# Patient Record
Sex: MALE | Race: BLACK OR AFRICAN AMERICAN | NOT HISPANIC OR LATINO
[De-identification: names, ages, dates, MRNs, and addresses within clinical notes are randomized per-mention and may not be internally consistent; named-entity substitution may affect disease eponyms.]

---

## 2017-11-28 ENCOUNTER — RESULT REVIEW (OUTPATIENT)
Age: 82
End: 2017-11-28

## 2018-08-17 PROBLEM — Z00.00 ENCOUNTER FOR PREVENTIVE HEALTH EXAMINATION: Status: ACTIVE | Noted: 2018-08-17

## 2018-09-13 ENCOUNTER — APPOINTMENT (OUTPATIENT)
Dept: VASCULAR SURGERY | Facility: CLINIC | Age: 83
End: 2018-09-13
Payer: MEDICARE

## 2018-09-13 VITALS — SYSTOLIC BLOOD PRESSURE: 98 MMHG | HEART RATE: 66 BPM | DIASTOLIC BLOOD PRESSURE: 63 MMHG

## 2018-09-13 VITALS
BODY MASS INDEX: 16.39 KG/M2 | HEIGHT: 66 IN | DIASTOLIC BLOOD PRESSURE: 57 MMHG | SYSTOLIC BLOOD PRESSURE: 99 MMHG | HEART RATE: 68 BPM | WEIGHT: 102 LBS

## 2018-09-13 DIAGNOSIS — L97.309 NON-PRESSURE CHRONIC ULCER OF UNSPECIFIED ANKLE WITH UNSPECIFIED SEVERITY: ICD-10-CM

## 2018-09-13 PROCEDURE — 99203 OFFICE O/P NEW LOW 30 MIN: CPT

## 2018-10-10 ENCOUNTER — INPATIENT (INPATIENT)
Facility: HOSPITAL | Age: 83
LOS: 9 days | Discharge: HOME | DRG: 871 | End: 2018-10-20
Attending: INTERNAL MEDICINE | Admitting: INTERNAL MEDICINE
Payer: COMMERCIAL

## 2018-10-10 VITALS
SYSTOLIC BLOOD PRESSURE: 87 MMHG | HEART RATE: 115 BPM | TEMPERATURE: 99 F | OXYGEN SATURATION: 90 % | DIASTOLIC BLOOD PRESSURE: 62 MMHG | RESPIRATION RATE: 18 BRPM

## 2018-10-10 DIAGNOSIS — N40.0 BENIGN PROSTATIC HYPERPLASIA WITHOUT LOWER URINARY TRACT SYMPTOMS: ICD-10-CM

## 2018-10-10 DIAGNOSIS — Z71.89 OTHER SPECIFIED COUNSELING: ICD-10-CM

## 2018-10-10 DIAGNOSIS — F03.90 UNSPECIFIED DEMENTIA WITHOUT BEHAVIORAL DISTURBANCE: ICD-10-CM

## 2018-10-10 DIAGNOSIS — I10 ESSENTIAL (PRIMARY) HYPERTENSION: ICD-10-CM

## 2018-10-10 DIAGNOSIS — M86.9 OSTEOMYELITIS, UNSPECIFIED: ICD-10-CM

## 2018-10-10 DIAGNOSIS — R93.89 ABNORMAL FINDINGS ON DIAGNOSTIC IMAGING OF OTHER SPECIFIED BODY STRUCTURES: ICD-10-CM

## 2018-10-10 DIAGNOSIS — Z29.9 ENCOUNTER FOR PROPHYLACTIC MEASURES, UNSPECIFIED: ICD-10-CM

## 2018-10-10 LAB
ALBUMIN SERPL ELPH-MCNC: 2.2 G/DL — LOW (ref 3.5–5)
ALP SERPL-CCNC: 55 U/L — SIGNIFICANT CHANGE UP (ref 40–120)
ALT FLD-CCNC: 29 U/L DA — SIGNIFICANT CHANGE UP (ref 10–60)
ANION GAP SERPL CALC-SCNC: 10 MMOL/L — SIGNIFICANT CHANGE UP (ref 5–17)
APPEARANCE UR: CLEAR — SIGNIFICANT CHANGE UP
APTT BLD: 29.6 SEC — SIGNIFICANT CHANGE UP (ref 27.5–37.4)
AST SERPL-CCNC: 32 U/L — SIGNIFICANT CHANGE UP (ref 10–40)
BASOPHILS # BLD AUTO: 0.1 K/UL — SIGNIFICANT CHANGE UP (ref 0–0.2)
BASOPHILS NFR BLD AUTO: 0.5 % — SIGNIFICANT CHANGE UP (ref 0–2)
BILIRUB SERPL-MCNC: 0.3 MG/DL — SIGNIFICANT CHANGE UP (ref 0.2–1.2)
BILIRUB UR-MCNC: NEGATIVE — SIGNIFICANT CHANGE UP
BUN SERPL-MCNC: 20 MG/DL — HIGH (ref 7–18)
CALCIUM SERPL-MCNC: 7.8 MG/DL — LOW (ref 8.4–10.5)
CHLORIDE SERPL-SCNC: 98 MMOL/L — SIGNIFICANT CHANGE UP (ref 96–108)
CO2 SERPL-SCNC: 25 MMOL/L — SIGNIFICANT CHANGE UP (ref 22–31)
COLOR SPEC: YELLOW — SIGNIFICANT CHANGE UP
CREAT SERPL-MCNC: 1.21 MG/DL — SIGNIFICANT CHANGE UP (ref 0.5–1.3)
DIFF PNL FLD: ABNORMAL
EOSINOPHIL # BLD AUTO: 0 K/UL — SIGNIFICANT CHANGE UP (ref 0–0.5)
EOSINOPHIL NFR BLD AUTO: 0 % — SIGNIFICANT CHANGE UP (ref 0–6)
ERYTHROCYTE [SEDIMENTATION RATE] IN BLOOD: 82 MM/HR — HIGH (ref 0–20)
GLUCOSE SERPL-MCNC: 138 MG/DL — HIGH (ref 70–99)
GLUCOSE UR QL: NEGATIVE — SIGNIFICANT CHANGE UP
HCT VFR BLD CALC: 30.4 % — LOW (ref 39–50)
HGB BLD-MCNC: 9.7 G/DL — LOW (ref 13–17)
INR BLD: 1.37 RATIO — HIGH (ref 0.88–1.16)
KETONES UR-MCNC: NEGATIVE — SIGNIFICANT CHANGE UP
LACTATE SERPL-SCNC: 1.7 MMOL/L — SIGNIFICANT CHANGE UP (ref 0.7–2)
LACTATE SERPL-SCNC: 2.5 MMOL/L — HIGH (ref 0.7–2)
LEUKOCYTE ESTERASE UR-ACNC: ABNORMAL
LYMPHOCYTES # BLD AUTO: 1.4 K/UL — SIGNIFICANT CHANGE UP (ref 1–3.3)
LYMPHOCYTES # BLD AUTO: 10.2 % — LOW (ref 13–44)
MAGNESIUM SERPL-MCNC: 2 MG/DL — SIGNIFICANT CHANGE UP (ref 1.6–2.6)
MCHC RBC-ENTMCNC: 27.2 PG — SIGNIFICANT CHANGE UP (ref 27–34)
MCHC RBC-ENTMCNC: 32 GM/DL — SIGNIFICANT CHANGE UP (ref 32–36)
MCV RBC AUTO: 85 FL — SIGNIFICANT CHANGE UP (ref 80–100)
MONOCYTES # BLD AUTO: 1 K/UL — HIGH (ref 0–0.9)
MONOCYTES NFR BLD AUTO: 7.5 % — SIGNIFICANT CHANGE UP (ref 2–14)
NEUTROPHILS # BLD AUTO: 11.2 K/UL — HIGH (ref 1.8–7.4)
NEUTROPHILS NFR BLD AUTO: 81.8 % — HIGH (ref 43–77)
NITRITE UR-MCNC: NEGATIVE — SIGNIFICANT CHANGE UP
PH UR: 6.5 — SIGNIFICANT CHANGE UP (ref 5–8)
PLATELET # BLD AUTO: 472 K/UL — HIGH (ref 150–400)
POTASSIUM SERPL-MCNC: 4.3 MMOL/L — SIGNIFICANT CHANGE UP (ref 3.5–5.3)
POTASSIUM SERPL-SCNC: 4.3 MMOL/L — SIGNIFICANT CHANGE UP (ref 3.5–5.3)
PROT SERPL-MCNC: 7.2 G/DL — SIGNIFICANT CHANGE UP (ref 6–8.3)
PROT UR-MCNC: 30 MG/DL
PROTHROM AB SERPL-ACNC: 15 SEC — HIGH (ref 9.8–12.7)
RAPID RVP RESULT: DETECTED
RBC # BLD: 3.57 M/UL — LOW (ref 4.2–5.8)
RBC # FLD: 14.4 % — SIGNIFICANT CHANGE UP (ref 10.3–14.5)
RV+EV RNA SPEC QL NAA+PROBE: DETECTED
SODIUM SERPL-SCNC: 133 MMOL/L — LOW (ref 135–145)
SP GR SPEC: 1.01 — SIGNIFICANT CHANGE UP (ref 1.01–1.02)
UROBILINOGEN FLD QL: 4
WBC # BLD: 13.6 K/UL — HIGH (ref 3.8–10.5)
WBC # FLD AUTO: 13.6 K/UL — HIGH (ref 3.8–10.5)

## 2018-10-10 PROCEDURE — 99223 1ST HOSP IP/OBS HIGH 75: CPT | Mod: GC

## 2018-10-10 PROCEDURE — 71250 CT THORAX DX C-: CPT | Mod: 26

## 2018-10-10 PROCEDURE — 73630 X-RAY EXAM OF FOOT: CPT | Mod: 26,RT

## 2018-10-10 PROCEDURE — 71045 X-RAY EXAM CHEST 1 VIEW: CPT | Mod: 26

## 2018-10-10 PROCEDURE — 93010 ELECTROCARDIOGRAM REPORT: CPT

## 2018-10-10 PROCEDURE — 99285 EMERGENCY DEPT VISIT HI MDM: CPT

## 2018-10-10 RX ORDER — IPRATROPIUM/ALBUTEROL SULFATE 18-103MCG
3 AEROSOL WITH ADAPTER (GRAM) INHALATION ONCE
Qty: 0 | Refills: 0 | Status: COMPLETED | OUTPATIENT
Start: 2018-10-10 | End: 2018-10-10

## 2018-10-10 RX ORDER — PIPERACILLIN AND TAZOBACTAM 4; .5 G/20ML; G/20ML
3.38 INJECTION, POWDER, LYOPHILIZED, FOR SOLUTION INTRAVENOUS EVERY 12 HOURS
Qty: 0 | Refills: 0 | Status: DISCONTINUED | OUTPATIENT
Start: 2018-10-10 | End: 2018-10-11

## 2018-10-10 RX ORDER — VANCOMYCIN HCL 1 G
1000 VIAL (EA) INTRAVENOUS ONCE
Qty: 0 | Refills: 0 | Status: COMPLETED | OUTPATIENT
Start: 2018-10-10 | End: 2018-10-10

## 2018-10-10 RX ORDER — ENOXAPARIN SODIUM 100 MG/ML
30 INJECTION SUBCUTANEOUS DAILY
Qty: 0 | Refills: 0 | Status: DISCONTINUED | OUTPATIENT
Start: 2018-10-10 | End: 2018-10-20

## 2018-10-10 RX ORDER — SODIUM CHLORIDE 9 MG/ML
2000 INJECTION INTRAMUSCULAR; INTRAVENOUS; SUBCUTANEOUS ONCE
Qty: 0 | Refills: 0 | Status: COMPLETED | OUTPATIENT
Start: 2018-10-10 | End: 2018-10-10

## 2018-10-10 RX ORDER — LISINOPRIL 2.5 MG/1
20 TABLET ORAL DAILY
Qty: 0 | Refills: 0 | Status: DISCONTINUED | OUTPATIENT
Start: 2018-10-10 | End: 2018-10-12

## 2018-10-10 RX ORDER — FINASTERIDE 5 MG/1
5 TABLET, FILM COATED ORAL DAILY
Qty: 0 | Refills: 0 | Status: DISCONTINUED | OUTPATIENT
Start: 2018-10-10 | End: 2018-10-20

## 2018-10-10 RX ORDER — VANCOMYCIN HCL 1 G
750 VIAL (EA) INTRAVENOUS EVERY 24 HOURS
Qty: 0 | Refills: 0 | Status: DISCONTINUED | OUTPATIENT
Start: 2018-10-10 | End: 2018-10-10

## 2018-10-10 RX ORDER — CEFEPIME 1 G/1
2000 INJECTION, POWDER, FOR SOLUTION INTRAMUSCULAR; INTRAVENOUS ONCE
Qty: 0 | Refills: 0 | Status: COMPLETED | OUTPATIENT
Start: 2018-10-10 | End: 2018-10-10

## 2018-10-10 RX ORDER — VANCOMYCIN HCL 1 G
750 VIAL (EA) INTRAVENOUS EVERY 12 HOURS
Qty: 0 | Refills: 0 | Status: DISCONTINUED | OUTPATIENT
Start: 2018-10-10 | End: 2018-10-12

## 2018-10-10 RX ORDER — TAMSULOSIN HYDROCHLORIDE 0.4 MG/1
0.4 CAPSULE ORAL AT BEDTIME
Qty: 0 | Refills: 0 | Status: DISCONTINUED | OUTPATIENT
Start: 2018-10-10 | End: 2018-10-20

## 2018-10-10 RX ORDER — ASPIRIN/CALCIUM CARB/MAGNESIUM 324 MG
81 TABLET ORAL DAILY
Qty: 0 | Refills: 0 | Status: DISCONTINUED | OUTPATIENT
Start: 2018-10-10 | End: 2018-10-20

## 2018-10-10 RX ADMIN — Medication 250 MILLIGRAM(S): at 17:50

## 2018-10-10 RX ADMIN — Medication 3 MILLILITER(S): at 15:00

## 2018-10-10 RX ADMIN — Medication 3 MILLILITER(S): at 15:52

## 2018-10-10 RX ADMIN — Medication 81 MILLIGRAM(S): at 22:30

## 2018-10-10 RX ADMIN — TAMSULOSIN HYDROCHLORIDE 0.4 MILLIGRAM(S): 0.4 CAPSULE ORAL at 22:30

## 2018-10-10 RX ADMIN — CEFEPIME 100 MILLIGRAM(S): 1 INJECTION, POWDER, FOR SOLUTION INTRAMUSCULAR; INTRAVENOUS at 15:53

## 2018-10-10 RX ADMIN — SODIUM CHLORIDE 2000 MILLILITER(S): 9 INJECTION INTRAMUSCULAR; INTRAVENOUS; SUBCUTANEOUS at 15:57

## 2018-10-10 RX ADMIN — CEFEPIME 2000 MILLIGRAM(S): 1 INJECTION, POWDER, FOR SOLUTION INTRAMUSCULAR; INTRAVENOUS at 17:50

## 2018-10-10 RX ADMIN — Medication 125 MILLIGRAM(S): at 15:53

## 2018-10-10 RX ADMIN — SODIUM CHLORIDE 2000 MILLILITER(S): 9 INJECTION INTRAMUSCULAR; INTRAVENOUS; SUBCUTANEOUS at 14:05

## 2018-10-10 RX ADMIN — Medication 3 MILLILITER(S): at 15:51

## 2018-10-10 RX ADMIN — PIPERACILLIN AND TAZOBACTAM 25 GRAM(S): 4; .5 INJECTION, POWDER, LYOPHILIZED, FOR SOLUTION INTRAVENOUS at 22:31

## 2018-10-10 RX ADMIN — ENOXAPARIN SODIUM 30 MILLIGRAM(S): 100 INJECTION SUBCUTANEOUS at 22:30

## 2018-10-10 NOTE — H&P ADULT - PROBLEM SELECTOR PLAN 5
not on any medications   bedbound - non verbal, non mobile for about 14 years  follow up with palliative

## 2018-10-10 NOTE — ED PROVIDER NOTE - OBJECTIVE STATEMENT
Per Family 86 year old M Pt w/ PMHx of BPH, HTN, and Advanced Dementia presents to ED c/o right foot ulceration x 2 weeks. Pt visited podiatrist yesterday for a worsening right foot ulceration, located by his big toe. Pt was referred to ED for evaluation of possible osteomyelitis yesterday, but was unable to come to ED yesterday as it was not possible to arrange an ambulance.  NKDA

## 2018-10-10 NOTE — CONSULT NOTE ADULT - SUBJECTIVE AND OBJECTIVE BOX
Patient is a 86y old  Male who presents with a chief complaint of right foot ulcers    HPI: Per Family 86 year old M Pt w/ PMHx of BPH, HTN, and Advanced Dementia presents to ED c/o right foot ulceration x 2 weeks. Pt visited podiatrist yesterday for a worsening right foot ulceration, located by his big toe. Pt was referred to ED for evaluation of possible osteomyelitis yesterday, but was unable to come to ED yesterday as it was not possible to arrange an ambulance.  NKDA  Pt seen bedside in ED by podiatry. Pt unable to communicate, Pt's family states he can not communicate. Pt's family denies F/N/V/SOB. states pt sent by podiatrist Dr. Valentin to evaluate if patient has osteomyelitis.       PMH:HTN (hypertension)  Advanced dementia  BPH (benign prostatic hyperplasia)    Allergies: No Known Allergies    Medications:   FH:  PSX: No significant past surgical history    SH:     Vital Signs Last 24 Hrs  T(C): 37.1 (10 Oct 2018 17:07), Max: 37.3 (10 Oct 2018 12:38)  T(F): 98.7 (10 Oct 2018 17:07), Max: 99.2 (10 Oct 2018 12:38)  HR: 118 (10 Oct 2018 17:07) (110 - 118)  BP: 141/91 (10 Oct 2018 17:07) (87/62 - 141/91)  BP(mean): --  RR: 18 (10 Oct 2018 17:07) (18 - 18)  SpO2: 96% (10 Oct 2018 17:07) (90% - 96%)    LABS                        9.7    13.6  )-----------( 472      ( 10 Oct 2018 14:18 )             30.4               10-10    133<L>  |  98  |  20<H>  ----------------------------<  138<H>  4.3   |  25  |  1.21    Ca    7.8<L>      10 Oct 2018 14:18  Mg     2.0     10-10    TPro  7.2  /  Alb  2.2<L>  /  TBili  0.3  /  DBili  x   /  AST  32  /  ALT  29  /  AlkPhos  55  10-10        PHYSICAL EXAM  GEN: PERLA HER is a pleasant well-nourished, well developed 86y Male in no acute distress, alert awake, and oriented to person, place and time.   LE Focused: right foot focused   Vasc:  DP/PT palpable, CFT < 3 secs x 5, no edema, TG warm to cool  Derm: ulcers: medial aspect of 1st MPJ, dorsal foot, lateral ankle, no drainage, -PTB to all, no malodor, fibrogranular base, normal borders  Neuro: unable to perform      Imaging: < from: Xray Foot AP + Lateral + Oblique, Right (10.10.18 @ 15:27) >  PROCEDURE DATE:  10/10/2018          INTERPRETATION:  CLINICAL STATEMENT: Pain. Evaluate for osteomyelitis   first toe    TECHNIQUE: AP, lateral and oblique views of right foot    COMPARISON: None.    FINDINGS:  Diffuse soft tissue swelling first digit. Flexion of the phalanges   limited evaluation of the phalanges.. Diffuse osteopenia. Difficult to   evaluate first interphalangeal joint.    Lucency at the head of the first metatarsal which may represent   osteomyelitis in the correct clinical setting. Correlate clinically.    Otherwise, no acute fracture        IMPRESSION:  Findings as described above.     < end of copied text >    Cultures: pending    A: right foot ulcers    P:  pt evaluated and chart reviewed  culture obtained  xrays reviewed see above  MRI recommended to rule out OM  IV antibiotics as per ID  Podiatry to follow while in house

## 2018-10-10 NOTE — H&P ADULT - ATTENDING COMMENTS
Patient seen/evaluated at bedside in the ED on 10/10. I agree with the resident H&P note/outlined plan of care. My independent findings and conclusions are documented. Please refer to this section for full list of diagnoses.    80 y/o male with advanced Alzheimer's Dementia, non-verbal, bedbound x 16 years, referred from podiatrist office for worsening ulceration of right foot. Initial vitals in the ED showed SBP in 80s, . According to the nephew who lives with this patient, he eat ?"regular food" Also states he ambulates at baseline.    ROS not meaningfully obtained    PE vitals reviewed  cachectic, eyes open, non responsive/not interactive--> nephew reports this is his baseline x 16 years  PERRLA  coarse upper airway sounds  S1S2 RRR  right great toe ulcer    xray Diffuse soft tissue swelling first digit. Flexion of the phalanges   limited evaluation of the phalanges.. Diffuse osteopenia. Difficult to   evaluate first interphalangeal joint.    Lucency at the head of the first metatarsal which may represent   osteomyelitis in the correct clinical setting. Correlate clinically.    Otherwise, no acute fracture    1.  sepsis on admission (elevated lactate, leukocytosis,  tachycardia) s/t  2. cellulitis infected ulcer of right foot, and likely osteomyelitis of right great toe  3. suspected aspiration pneumonia, RLL consolidation  4. enterovirus infection/bronchitis  5. severe protein calorie malnutrition  6. advanced alzheimer's dementia  7. dysphagia  8. debility      -continue w/ zosyn, vancomycin  -ID consult  -MRI of right foot if able to tolerate (which he may not)  -q 2 hour turns  -bronchodilators  -need collateral information from son/PMD  -change diet to dysphagia puree, HOB to 30 degrees  -speech and swallow consult  -add dietary supplements, nutrition consult  -physical therapy consult, if able to cooperate  -palliative care consult  -dvt ppx Patient seen/evaluated at bedside in the ED on 10/10. I agree with the resident H&P note/outlined plan of care. My independent findings and conclusions are documented. Please refer to this section for full list of diagnoses.    87 y/o male with advanced Alzheimer's Dementia, non-verbal, bedbound x 16 years, referred from podiatrist office for worsening ulceration of right foot. Initial vitals in the ED showed SBP in 80s, . According to the nephew who lives with this patient, he eat ?"regular food" Also states he ambulates at baseline.    ROS not meaningfully obtained    PE vitals reviewed  cachectic, eyes open, non responsive/not interactive--> nephew reports this is his baseline x 16 years  PERRLA  coarse upper airway sounds  S1S2 RRR  right great toe ulcer    xray Diffuse soft tissue swelling first digit. Flexion of the phalanges   limited evaluation of the phalanges.. Diffuse osteopenia. Difficult to   evaluate first interphalangeal joint.    Lucency at the head of the first metatarsal which may represent   osteomyelitis in the correct clinical setting. Correlate clinically.    Otherwise, no acute fracture    1.  sepsis on admission (elevated lactate, leukocytosis,  tachycardia) s/t  2. cellulitis infected ulcer of right foot, and likely osteomyelitis of right great toe  3. suspected aspiration pneumonia, RLL consolidation  4. enterovirus infection/bronchitis  5. severe protein calorie malnutrition  6. advanced alzheimer's dementia  7. dysphagia  8. debility      -continue w/ zosyn, vancomycin  -ID consult  -MRI of right foot if able to tolerate (which he may not)  -q 2 hour turns  -bronchodilators  -need collateral information from son/PMD  -change diet to dysphagia puree, HOB to 30 degrees  -speech and swallow consult  -add dietary supplements, nutrition consult  -physical therapy consult, if able to cooperate  -palliative care consult  -dvt ppx

## 2018-10-10 NOTE — H&P ADULT - NSHPPHYSICALEXAM_GEN_ALL_CORE
· Constitutional	Well-developed, well nourished  · Eyes	no discharge noted   · Respiratory	Breath Sounds equal & clear to percussion & auscultation, no accessory muscle use  · Cardiovascular	Regular rate & rhythm, normal S1, S2; no murmurs, gallops or rubs; no S3, S4  · Gastrointestinal	Soft, non-tender, no hepatosplenomegaly, normal bowel sounds  · Extremities	right foot wrapped in dressing  · Neurological	AAOx0, contracted extremities, non verbal

## 2018-10-10 NOTE — H&P ADULT - NSHPLABSRESULTS_GEN_ALL_CORE
Vital Signs Last 24 Hrs  T(C): 37.1 (10 Oct 2018 17:07), Max: 37.3 (10 Oct 2018 12:38)  T(F): 98.7 (10 Oct 2018 17:07), Max: 99.2 (10 Oct 2018 12:38)  HR: 118 (10 Oct 2018 17:07) (110 - 118)  BP: 141/91 (10 Oct 2018 17:07) (87/62 - 141/91)  BP(mean): --  RR: 18 (10 Oct 2018 17:07) (18 - 18)  SpO2: 96% (10 Oct 2018 17:07) (90% - 96%)                            9.7    13.6  )-----------( 472      ( 10 Oct 2018 14:18 )             30.4       10-10    133<L>  |  98  |  20<H>  ----------------------------<  138<H>  4.3   |  25  |  1.21    Ca    7.8<L>      10 Oct 2018 14:18  Mg     2.0     10-10    TPro  7.2  /  Alb  2.2<L>  /  TBili  0.3  /  DBili  x   /  AST  32  /  ALT  29  /  AlkPhos  55  10-10              Urinalysis Basic - ( 10 Oct 2018 14:40 )    Color: Yellow / Appearance: Clear / S.015 / pH: x  Gluc: x / Ketone: Negative  / Bili: Negative / Urobili: 4   Blood: x / Protein: 30 mg/dL / Nitrite: Negative   Leuk Esterase: Trace / RBC: Negative /HPF / WBC 0-2 /HPF   Sq Epi: x / Non Sq Epi: Moderate /HPF / Bacteria: Few /HPF        PT/INR - ( 10 Oct 2018 14:18 )   PT: 15.0 sec;   INR: 1.37 ratio         PTT - ( 10 Oct 2018 14:18 )  PTT:29.6 sec    Lactate Trend  10-10 @ 17:50 Lactate:1.7   10-10 @ 14:18 Lactate:2.5

## 2018-10-10 NOTE — ED ADULT NURSE NOTE - NSIMPLEMENTINTERV_GEN_ALL_ED
Implemented All Fall with Harm Risk Interventions:  [place] to call system. Call bell, personal items and telephone within reach. Instruct patient to call for assistance. Room bathroom lighting operational. Non-slip footwear when patient is off stretcher. Physically safe environment: no spills, clutter or unnecessary equipment. Stretcher in lowest position, wheels locked, appropriate side rails in place. Provide visual cue, wrist band, yellow gown, etc. Monitor gait and stability. Monitor for mental status changes and reorient to person, place, and time. Review medications for side effects contributing to fall risk. Reinforce activity limits and safety measures with patient and family. Provide visual clues: red socks.

## 2018-10-10 NOTE — H&P ADULT - PROBLEM SELECTOR PLAN 1
was sent from podiatry for foot ulcer  Xray foot: Lucency at the head of the first metatarsal which may represent osteomyelitis  podiatry was consulted and recommended MRI   stat dose of vanco and cefepime was given in ED   case discussed with ID Dr. Zimmer   will continue with vanco and zosyn   follow MRI

## 2018-10-10 NOTE — ED ADULT NURSE NOTE - OBJECTIVE STATEMENT
AS PER WIFE THEY BROUGHT PT IN FOR XRAY OF THE RIGHT FOOT TO R/O OESTOMYLISIS. PT IS NOVERBAL AND CONTRACTED IN WHEELCHAIR

## 2018-10-10 NOTE — H&P ADULT - ASSESSMENT
86/ M Pt with PMH of BPH, HTN, and Advanced Dementia presents to ED c/o right foot ulceration x 2 weeks. Pt visited podiatrist yesterday for a worsening right foot ulceration, located by his big toe form where he was sent to hospital for further work up, Patient was seen in ED by podiatry.     In ED:   stat dose of vanco and cefepime was given   Vitals: 136/90, 110, 98.3, 18(96)  podiatry was consulted and recommended MRI , wound culture was taken   Xray foot: Lucency at the head of the first metatarsal which may represent osteomyelitis  Xray chest: Question additional density superimposing with anterior right   first rib. Extra imaging and/or CT might be advisable.

## 2018-10-10 NOTE — H&P ADULT - PROBLEM SELECTOR PLAN 2
Xray chest: Question additional density superimposing with anterior right   first rib. Extra imaging and/or CT might be advisable.  will get CT chest

## 2018-10-11 DIAGNOSIS — J18.8 OTHER PNEUMONIA, UNSPECIFIED ORGANISM: ICD-10-CM

## 2018-10-11 LAB
24R-OH-CALCIDIOL SERPL-MCNC: 39.6 NG/ML — SIGNIFICANT CHANGE UP (ref 30–80)
ANION GAP SERPL CALC-SCNC: 9 MMOL/L — SIGNIFICANT CHANGE UP (ref 5–17)
BASOPHILS # BLD AUTO: 0 K/UL — SIGNIFICANT CHANGE UP (ref 0–0.2)
BASOPHILS NFR BLD AUTO: 0.3 % — SIGNIFICANT CHANGE UP (ref 0–2)
BUN SERPL-MCNC: 15 MG/DL — SIGNIFICANT CHANGE UP (ref 7–18)
CALCIUM SERPL-MCNC: 8 MG/DL — LOW (ref 8.4–10.5)
CHLORIDE SERPL-SCNC: 102 MMOL/L — SIGNIFICANT CHANGE UP (ref 96–108)
CHOLEST SERPL-MCNC: 88 MG/DL — SIGNIFICANT CHANGE UP (ref 10–199)
CO2 SERPL-SCNC: 21 MMOL/L — LOW (ref 22–31)
CREAT SERPL-MCNC: 0.89 MG/DL — SIGNIFICANT CHANGE UP (ref 0.5–1.3)
CRP SERPL-MCNC: 9.25 MG/DL — HIGH (ref 0–0.4)
EOSINOPHIL # BLD AUTO: 0 K/UL — SIGNIFICANT CHANGE UP (ref 0–0.5)
EOSINOPHIL NFR BLD AUTO: 0 % — SIGNIFICANT CHANGE UP (ref 0–6)
FOLATE SERPL-MCNC: 14.9 NG/ML — SIGNIFICANT CHANGE UP
GLUCOSE SERPL-MCNC: 163 MG/DL — HIGH (ref 70–99)
HBA1C BLD-MCNC: 5.5 % — SIGNIFICANT CHANGE UP (ref 4–5.6)
HCT VFR BLD CALC: 26.8 % — LOW (ref 39–50)
HDLC SERPL-MCNC: 10 MG/DL — LOW
HGB BLD-MCNC: 8.5 G/DL — LOW (ref 13–17)
LIPID PNL WITH DIRECT LDL SERPL: 66 MG/DL — SIGNIFICANT CHANGE UP
LYMPHOCYTES # BLD AUTO: 1.3 K/UL — SIGNIFICANT CHANGE UP (ref 1–3.3)
LYMPHOCYTES # BLD AUTO: 13 % — SIGNIFICANT CHANGE UP (ref 13–44)
MAGNESIUM SERPL-MCNC: 2.1 MG/DL — SIGNIFICANT CHANGE UP (ref 1.6–2.6)
MCHC RBC-ENTMCNC: 27.3 PG — SIGNIFICANT CHANGE UP (ref 27–34)
MCHC RBC-ENTMCNC: 31.8 GM/DL — LOW (ref 32–36)
MCV RBC AUTO: 85.9 FL — SIGNIFICANT CHANGE UP (ref 80–100)
MONOCYTES # BLD AUTO: 0.2 K/UL — SIGNIFICANT CHANGE UP (ref 0–0.9)
MONOCYTES NFR BLD AUTO: 1.9 % — LOW (ref 2–14)
NEUTROPHILS # BLD AUTO: 8.3 K/UL — HIGH (ref 1.8–7.4)
NEUTROPHILS NFR BLD AUTO: 84.9 % — HIGH (ref 43–77)
PHOSPHATE SERPL-MCNC: 2.9 MG/DL — SIGNIFICANT CHANGE UP (ref 2.5–4.5)
PLATELET # BLD AUTO: 455 K/UL — HIGH (ref 150–400)
POTASSIUM SERPL-MCNC: 3.9 MMOL/L — SIGNIFICANT CHANGE UP (ref 3.5–5.3)
POTASSIUM SERPL-SCNC: 3.9 MMOL/L — SIGNIFICANT CHANGE UP (ref 3.5–5.3)
RBC # BLD: 3.12 M/UL — LOW (ref 4.2–5.8)
RBC # FLD: 14.3 % — SIGNIFICANT CHANGE UP (ref 10.3–14.5)
SODIUM SERPL-SCNC: 132 MMOL/L — LOW (ref 135–145)
TOTAL CHOLESTEROL/HDL RATIO MEASUREMENT: 8.8 RATIO — SIGNIFICANT CHANGE UP (ref 3.4–9.6)
TRIGL SERPL-MCNC: 60 MG/DL — SIGNIFICANT CHANGE UP (ref 10–149)
TSH SERPL-MCNC: 0.17 UU/ML — LOW (ref 0.34–4.82)
VIT B12 SERPL-MCNC: 1347 PG/ML — HIGH (ref 232–1245)
WBC # BLD: 9.8 K/UL — SIGNIFICANT CHANGE UP (ref 3.8–10.5)
WBC # FLD AUTO: 9.8 K/UL — SIGNIFICANT CHANGE UP (ref 3.8–10.5)

## 2018-10-11 PROCEDURE — 99233 SBSQ HOSP IP/OBS HIGH 50: CPT | Mod: GC

## 2018-10-11 RX ORDER — ACETYLCYSTEINE 200 MG/ML
2.5 VIAL (ML) MISCELLANEOUS EVERY 6 HOURS
Qty: 0 | Refills: 0 | Status: DISCONTINUED | OUTPATIENT
Start: 2018-10-11 | End: 2018-10-19

## 2018-10-11 RX ORDER — SODIUM CHLORIDE 9 MG/ML
1000 INJECTION, SOLUTION INTRAVENOUS
Qty: 0 | Refills: 0 | Status: DISCONTINUED | OUTPATIENT
Start: 2018-10-11 | End: 2018-10-12

## 2018-10-11 RX ORDER — ACETYLCYSTEINE 200 MG/ML
4 VIAL (ML) MISCELLANEOUS EVERY 6 HOURS
Qty: 0 | Refills: 0 | Status: DISCONTINUED | OUTPATIENT
Start: 2018-10-11 | End: 2018-10-11

## 2018-10-11 RX ORDER — PIPERACILLIN AND TAZOBACTAM 4; .5 G/20ML; G/20ML
3.38 INJECTION, POWDER, LYOPHILIZED, FOR SOLUTION INTRAVENOUS EVERY 8 HOURS
Qty: 0 | Refills: 0 | Status: DISCONTINUED | OUTPATIENT
Start: 2018-10-11 | End: 2018-10-20

## 2018-10-11 RX ORDER — INFLUENZA VIRUS VACCINE 15; 15; 15; 15 UG/.5ML; UG/.5ML; UG/.5ML; UG/.5ML
0.5 SUSPENSION INTRAMUSCULAR ONCE
Qty: 0 | Refills: 0 | Status: COMPLETED | OUTPATIENT
Start: 2018-10-11 | End: 2018-10-19

## 2018-10-11 RX ORDER — IPRATROPIUM/ALBUTEROL SULFATE 18-103MCG
3 AEROSOL WITH ADAPTER (GRAM) INHALATION EVERY 6 HOURS
Qty: 0 | Refills: 0 | Status: DISCONTINUED | OUTPATIENT
Start: 2018-10-11 | End: 2018-10-17

## 2018-10-11 RX ADMIN — FINASTERIDE 5 MILLIGRAM(S): 5 TABLET, FILM COATED ORAL at 17:16

## 2018-10-11 RX ADMIN — Medication 3 MILLILITER(S): at 12:51

## 2018-10-11 RX ADMIN — Medication 250 MILLIGRAM(S): at 06:13

## 2018-10-11 RX ADMIN — Medication 81 MILLIGRAM(S): at 17:15

## 2018-10-11 RX ADMIN — SODIUM CHLORIDE 75 MILLILITER(S): 9 INJECTION, SOLUTION INTRAVENOUS at 13:17

## 2018-10-11 RX ADMIN — Medication 3 MILLILITER(S): at 17:15

## 2018-10-11 RX ADMIN — Medication 250 MILLIGRAM(S): at 17:24

## 2018-10-11 RX ADMIN — PIPERACILLIN AND TAZOBACTAM 25 GRAM(S): 4; .5 INJECTION, POWDER, LYOPHILIZED, FOR SOLUTION INTRAVENOUS at 06:16

## 2018-10-11 RX ADMIN — Medication 4 MILLILITER(S): at 20:25

## 2018-10-11 RX ADMIN — ENOXAPARIN SODIUM 30 MILLIGRAM(S): 100 INJECTION SUBCUTANEOUS at 15:00

## 2018-10-11 RX ADMIN — Medication 4 MILLILITER(S): at 10:24

## 2018-10-11 RX ADMIN — LISINOPRIL 20 MILLIGRAM(S): 2.5 TABLET ORAL at 06:17

## 2018-10-11 RX ADMIN — Medication 3 MILLILITER(S): at 20:24

## 2018-10-11 RX ADMIN — Medication 4 MILLILITER(S): at 17:19

## 2018-10-11 RX ADMIN — PIPERACILLIN AND TAZOBACTAM 25 GRAM(S): 4; .5 INJECTION, POWDER, LYOPHILIZED, FOR SOLUTION INTRAVENOUS at 17:23

## 2018-10-11 NOTE — CONSULT NOTE ADULT - SUBJECTIVE AND OBJECTIVE BOX
HPI:  86/ M Pt with PMH of BPH, HTN, and Advanced Dementia presents to ED c/o right foot ulceration x 2 weeks. Pt visited podiatrist yesterday for a worsening right foot ulceration, located by his big toe form where he was sent to hospital for further work up, Patient was seen in ED by podiatry. (10 Oct 2018 19:50)    REVIEW OF SYSTEMS:  [  ] Not able to illicit  General:	  Chest:	  GI:	  :  Skin:	  Musculoskeletal:	  Neuro:    PAST MEDICAL & SURGICAL HISTORY:  HTN (hypertension)  Advanced dementia  BPH (benign prostatic hyperplasia)  No significant past surgical history    ALLERGIES: No Known Allergies    MEDS:  acetylcysteine 20% Inhalation 4 milliLiter(s) Inhalation every 6 hours  ALBUTerol/ipratropium for Nebulization 3 milliLiter(s) Nebulizer every 6 hours  aspirin enteric coated 81 milliGRAM(s) Oral daily  dextrose 5% + sodium chloride 0.9%. 1000 milliLiter(s) IV Continuous <Continuous>  enoxaparin Injectable 30 milliGRAM(s) SubCutaneous daily  finasteride 5 milliGRAM(s) Oral daily  influenza   Vaccine 0.5 milliLiter(s) IntraMuscular once  lisinopril 20 milliGRAM(s) Oral daily  piperacillin/tazobactam IVPB. 3.375 Gram(s) IV Intermittent every 12 hours  tamsulosin 0.4 milliGRAM(s) Oral at bedtime  vancomycin  IVPB 750 milliGRAM(s) IV Intermittent every 12 hours    SOCIAL HISTORY:  Smoker:      FAMILY HISTORY:  No pertinent family history in first degree relatives    VITALS:  Vital Signs Last 24 Hrs  T(C): 36.8 (11 Oct 2018 04:48), Max: 37.3 (10 Oct 2018 12:38)  T(F): 98.2 (11 Oct 2018 04:48), Max: 99.2 (10 Oct 2018 12:38)  HR: 90 (11 Oct 2018 04:48) (82 - 118)  BP: 122/64 (11 Oct 2018 04:48) (87/62 - 141/91)  BP(mean): --  RR: 18 (11 Oct 2018 04:48) (15 - 18)  SpO2: 97% (11 Oct 2018 04:48) (90% - 99%)      PHYSICAL EXAM:  Constitutional:  HEENT:  Neck:  Respiratory:  Cardiovascular:  Gastrointestinal:  Extremities:  Skin:  Ortho:  Neuro:      LABS/DIAGNOSTIC TESTS:                        8.5    9.8   )-----------( 455      ( 11 Oct 2018 07:37 )             26.8     WBC Count: 9.8 K/uL (10-11 @ 07:37)  WBC Count: 13.6 K/uL (10-10 @ 14:18)    10-11    132<L>  |  102  |  15  ----------------------------<  163<H>  3.9   |  21<L>  |  0.89    Ca    8.0<L>      11 Oct 2018 07:37  Phos  2.9     10-11  Mg     2.1     10-11    TPro  7.2  /  Alb  2.2<L>  /  TBili  0.3  /  DBili  x   /  AST  32  /  ALT  29  /  AlkPhos  55  10-10    Urinalysis Basic - ( 10 Oct 2018 14:40 )    Color: Yellow / Appearance: Clear / S.015 / pH: x  Gluc: x / Ketone: Negative  / Bili: Negative / Urobili: 4   Blood: x / Protein: 30 mg/dL / Nitrite: Negative   Leuk Esterase: Trace / RBC: Negative /HPF / WBC 0-2 /HPF   Sq Epi: x / Non Sq Epi: Moderate /HPF / Bacteria: Few /HPF      LIVER FUNCTIONS - ( 10 Oct 2018 14:18 )  Alb: 2.2 g/dL / Pro: 7.2 g/dL / ALK PHOS: 55 U/L / ALT: 29 U/L DA / AST: 32 U/L / GGT: x           PT/INR - ( 10 Oct 2018 14:18 )   PT: 15.0 sec;   INR: 1.37 ratio         PTT - ( 10 Oct 2018 14:18 )  PTT:29.6 sec  Lactate, Blood: 1.7 mmol/L (10-10 @ 17:50)  Lactate, Blood: 2.5 mmol/L (10-10 @ 14:18)    ABG -     CULTURES:       RADIOLOGY: HPI:  ID consult was called to evaluate 85 y/o male for pneumonia and right foot ulcer. Sent to hosp yesterday as per podiatrist for worsening right foot ulcer.  Upon workup, it was also found that patient has pneumonia likely from aspiration. +RVP with rhino/ enterovirus as well. Going for MRI of right foot today. Cultures are all testing.     As per H&P:  86/ M Pt with PMH of BPH, HTN, and Advanced Dementia presents to ED c/o right foot ulceration x 2 weeks. Pt visited podiatrist yesterday for a worsening right foot ulceration, located by his big toe form where he was sent to hospital for further work up, Patient was seen in ED by podiatry. (10 Oct 2018 19:50)    REVIEW OF SYSTEMS:  [ X ] Not able to illicit     PAST MEDICAL & SURGICAL HISTORY:  HTN (hypertension)  Advanced dementia  BPH (benign prostatic hyperplasia)  No significant past surgical history    ALLERGIES: No Known Allergies    MEDS:  acetylcysteine 20% Inhalation 4 milliLiter(s) Inhalation every 6 hours  ALBUTerol/ipratropium for Nebulization 3 milliLiter(s) Nebulizer every 6 hours  aspirin enteric coated 81 milliGRAM(s) Oral daily  dextrose 5% + sodium chloride 0.9%. 1000 milliLiter(s) IV Continuous <Continuous>  enoxaparin Injectable 30 milliGRAM(s) SubCutaneous daily  finasteride 5 milliGRAM(s) Oral daily  influenza   Vaccine 0.5 milliLiter(s) IntraMuscular once  lisinopril 20 milliGRAM(s) Oral daily  piperacillin/tazobactam IVPB. 3.375 Gram(s) IV Intermittent every 12 hours  tamsulosin 0.4 milliGRAM(s) Oral at bedtime  vancomycin  IVPB 750 milliGRAM(s) IV Intermittent every 12 hours    SOCIAL HISTORY:  Smoker:  unknown    FAMILY HISTORY:  No pertinent family history in first degree relatives    VITALS:  Vital Signs Last 24 Hrs  T(C): 36.8 (11 Oct 2018 04:48), Max: 37.3 (10 Oct 2018 12:38)  T(F): 98.2 (11 Oct 2018 04:48), Max: 99.2 (10 Oct 2018 12:38)  HR: 90 (11 Oct 2018 04:48) (82 - 118)  BP: 122/64 (11 Oct 2018 04:48) (87/62 - 141/91)  BP(mean): --  RR: 18 (11 Oct 2018 04:48) (15 - 18)  SpO2: 97% (11 Oct 2018 04:48) (90% - 99%)      PHYSICAL EXAM:  Constitutional: frail, thin elderly male  HEENT: dry oral mucosa with poor dentaton; bilateral temporal wasting  Neck: stiff arthritic neck no LN's   Respiratory: bilateral coarse rhonchorous sounds  +audible congestion  Cardiovascular: S1 S2 reg no murmurs  Gastrointestinal: +BS with soft, nondistended abdomen; nontender  Extremities: no edema no cyanosis  Skin: ulceration along lateral aspect of right foot by head of MTP, currently dry  Ortho: mildly contracted BLE  Neuro: demented       LABS/DIAGNOSTIC TESTS:                        8.5    9.8   )-----------( 455      ( 11 Oct 2018 07:37 )             26.8     WBC Count: 9.8 K/uL (10-11 @ 07:37)  WBC Count: 13.6 K/uL (10-10 @ 14:18)    10-    132<L>  |  102  |  15  ----------------------------<  163<H>  3.9   |  21<L>  |  0.89    Ca    8.0<L>      11 Oct 2018 07:37  Phos  2.9     10-11  Mg     2.1     10-11    TPro  7.2  /  Alb  2.2<L>  /  TBili  0.3  /  DBili  x   /  AST  32  /  ALT  29  /  AlkPhos  55  10-10    Urinalysis Basic - ( 10 Oct 2018 14:40 )  Color: Yellow / Appearance: Clear / S.015 / pH: x  Gluc: x / Ketone: Negative  / Bili: Negative / Urobili: 4   Blood: x / Protein: 30 mg/dL / Nitrite: Negative   Leuk Esterase: Trace / RBC: Negative /HPF / WBC 0-2 /HPF   Sq Epi: x / Non Sq Epi: Moderate /HPF / Bacteria: Few /HPF    LIVER FUNCTIONS - ( 10 Oct 2018 14:18 )  Alb: 2.2 g/dL / Pro: 7.2 g/dL / ALK PHOS: 55 U/L / ALT: 29 U/L DA / AST: 32 U/L / GGT: x           PT/INR - ( 10 Oct 2018 14:18 )   PT: 15.0 sec;   INR: 1.37 ratio    PTT - ( 10 Oct 2018 14:18 )  PTT:29.6 sec    Lactate, Blood: 1.7 mmol/L (10-10 @ 17:50)  Lactate, Blood: 2.5 mmol/L (10-10 @ 14:18)    Sedimentation Rate, Erythrocyte (10.10.18 @ 17:50)    Sedimentation Rate, Erythrocyte: 82 mm/Hr    Rapid Respiratory Viral Panel (10.10.18 @ 14:40)    Rapid RVP Result: Entero/Rhinovirus (RapRVP): Detected        CULTURES:   10/10 BC - pending   10/10 UC - pending   10/10 right foot ulcer culture - pending       RADIOLOGY:  10/11 MRI of right foot - ordered    EXAM:  CT CHEST                        PROCEDURE DATE:  10/10/2018    INTERPRETATION:  Chest CT without IV contrast    Indication: Evaluation for a lung mass.    Technique: Axial multidetector CT images of the chest are acquired   withoutIV contrast.    Comparison: None.    Findings: No evidence for pleural effusion. Small anterior pericardial   effusion. The heart is not enlarged. Aortic and coronary artery   calcifications are present. No evidence for aortic aneurysm.  Mildly   enlarged 1.5 cm lymph node at the zygoesophageal recess. Allowing for the   noncontrast technique, there is no grossly enlarged hilar, or axillary   lymph node.    Evaluation of the central airway is limited by respiratory motion.    Tree-in-bud nodular opacifications, and consolidation in the right lower   lung zone. Mild infiltrative densities in the right upper lobe. Slight   groundglass densities in the left lower lobe. Findings are suggestive of   pneumonia. Clinical correlation is recommended.    Limited sections through the upper abdomen demonstrate cholelithiasis.   Small nonobstructive calculus in the right kidney.    Impression: Small anterior pericardial effusion.    Mildly enlarged lymph node at the azygoesophageal recess.    Tree-in-bud nodular opacifications, and consolidation in the right lower   lung zone. Mild infiltrative densities in the right upper lobe. Slight   ground glass densities in the left lower lobe. Findings are suggestive of   pneumonia. Clinical correlation is recommended. If clinically indicated,   follow-up chest CT may be pursued in 4-6 weeks to ensure resolution.    Other findings as above.    A preliminary report was provided by [employer].        EXAM:  FOOT RIGHT (MINIMUM 3 VIEWS)                        PROCEDURE DATE:  10/10/2018    INTERPRETATION:  CLINICAL STATEMENT: Pain. Evaluate for osteomyelitis   first toe    TECHNIQUE: AP, lateral and oblique views of right foot    COMPARISON: None.    FINDINGS:  Diffuse soft tissue swelling first digit. Flexion of the phalanges   limited evaluation of the phalanges.. Diffuse osteopenia. Difficult to   evaluate first interphalangeal joint.    Lucency at the head of the first metatarsal which may represent   osteomyelitis in the correct clinical setting. Correlate clinically.    Otherwise, no acute fracture

## 2018-10-11 NOTE — PROGRESS NOTE ADULT - PROBLEM SELECTOR PLAN 2
-Xray chest: Question additional density superimposing with anterior right   first rib.  -CT chest: Mild/moderate airspace disease/consolidation right lower lobe, with   associated bronchial wall thickening. Mild patchy somewhat nodular airspace   disease right upper lobe. (prelim)  -RVP positive  -Pt will be covered by broad spectrum ABx for foot ulcer, which can cover possible superimposed bacterial pneumonia.  -Monitor Sx clinically. -Xray chest: Question additional density superimposing with anterior right   first rib.  -CT chest: Mild/moderate airspace disease/consolidation right lower lobe, with   associated bronchial wall thickening. Mild patchy somewhat nodular airspace   disease right upper lobe. (prelim)  -RVP positive  -Pt will be covered by broad spectrum ABx for foot ulcer, which can cover possible superimposed bacterial pneumonia.  -Patient is also at very high risk of aspiration, and has diffuse rhonchi on bilateral lung field. Speech and swallow evaluation**, aspiration precautions.  -Monitor Sx clinically. -Xray chest: Question additional density superimposing with anterior right   first rib.  -CT chest: Mild/moderate airspace disease/consolidation right lower lobe, with   associated bronchial wall thickening. Mild patchy somewhat nodular airspace   disease right upper lobe. (prelim)  -RVP positive  -Pt will be covered by broad spectrum ABx for foot ulcer, which can cover possible superimposed bacterial pneumonia.  -Patient is also at very high risk of aspiration, and has diffuse rhonchi on bilateral lung field. Speech and swallow evaluation**, aspiration precautions.  -Keep pt NPO for now  -Monitor Sx clinically.

## 2018-10-11 NOTE — PROGRESS NOTE ADULT - SUBJECTIVE AND OBJECTIVE BOX
Patient is a 86y old  Male who presents with a chief complaint of right foot ulcers    HPI: Per Family 86 year old M Pt w/ PMHx of BPH, HTN, and Advanced Dementia presents to ED c/o right foot ulceration x 2 weeks. Pt visited podiatrist yesterday for a worsening right foot ulceration, located by his big toe. Pt was referred to ED for evaluation of possible osteomyelitis yesterday, but was unable to come to ED yesterday as it was not possible to arrange an ambulance.  NKDA  Pt seen bedside by podiatry. Pt unable to communicate, Pt's family present bedside and states he can not communicate. Pt's family denies F/N/V/SOB. states pt sent by podiatrist Dr. Valentin to evaluate if patient has osteomyelitis. Pt family denies any overnight issues.       PMH:HTN (hypertension)  Advanced dementia  BPH (benign prostatic hyperplasia)    Allergies: No Known Allergies    Medications:   FH:  PSX: No significant past surgical history    SH:     ICU Vital Signs Last 24 Hrs  T(C): 36.8 (11 Oct 2018 04:48), Max: 37.3 (10 Oct 2018 12:38)  T(F): 98.2 (11 Oct 2018 04:48), Max: 99.2 (10 Oct 2018 12:38)  HR: 90 (11 Oct 2018 04:48) (82 - 118)  BP: 122/64 (11 Oct 2018 04:48) (87/62 - 141/91)  BP(mean): --  ABP: --  ABP(mean): --  RR: 18 (11 Oct 2018 04:48) (15 - 18)  SpO2: 97% (11 Oct 2018 04:48) (90% - 99%)      LABS                        8.5    9.8   )-----------( 455      ( 11 Oct 2018 07:37 )             26.8   10-11    132<L>  |  102  |  15  ----------------------------<  163<H>  3.9   |  21<L>  |  0.89    Ca    8.0<L>      11 Oct 2018 07:37  Phos  2.9     10-11  Mg     2.1     10-11    TPro  7.2  /  Alb  2.2<L>  /  TBili  0.3  /  DBili  x   /  AST  32  /  ALT  29  /  AlkPhos  55  10-10        PHYSICAL EXAM  GEN: PERLA HER is a pleasant well-nourished, well developed 86y Male in no acute distress, alert awake, and oriented to person, place and time.   LE Focused: right foot focused   Vasc:  DP/PT palpable, CFT < 3 secs x 5, no edema, TG warm to cool  Derm: ulcers: medial aspect of 1st MPJ, dorsal foot, lateral ankle, no drainage, -PTB to all, no malodor, fibrogranular base, normal borders  Neuro: unable to perform      Imaging: < from: Xray Foot AP + Lateral + Oblique, Right (10.10.18 @ 15:27) >  PROCEDURE DATE:  10/10/2018          INTERPRETATION:  CLINICAL STATEMENT: Pain. Evaluate for osteomyelitis   first toe    TECHNIQUE: AP, lateral and oblique views of right foot    COMPARISON: None.    FINDINGS:  Diffuse soft tissue swelling first digit. Flexion of the phalanges   limited evaluation of the phalanges.. Diffuse osteopenia. Difficult to   evaluate first interphalangeal joint.    Lucency at the head of the first metatarsal which may represent   osteomyelitis in the correct clinical setting. Correlate clinically.    Otherwise, no acute fracture        IMPRESSION:  Findings as described above.     < end of copied text >    Cultures: pending    A: right foot ulcers    P:  pt evaluated and chart reviewed  culture pending  xrays reviewed see above  awaiting MRI results   IV antibiotics as per ID  Podiatry to follow while in house

## 2018-10-11 NOTE — PROGRESS NOTE ADULT - PROBLEM SELECTOR PLAN 4
will continue with tamsulosin and finasteride will continue with tamsulosin and finasteride once patient can tolerate PO

## 2018-10-11 NOTE — PROGRESS NOTE ADULT - PROBLEM SELECTOR PLAN 3
-will continue lisinopril with parameters  -Monitor BP -Will continue lisinopril with parameters  -Monitor BP -Will continue lisinopril with parameters once patient can tolerate PO  -Monitor BP

## 2018-10-11 NOTE — ADVANCED PRACTICE NURSE CONSULT - ASSESSMENT
This is a 86yr old male patient admitted for Osteomyelitis, presenting with R. Lower Extremities Ulcerations, to which the patients is being followed by Podiatry with a treatment plan in place to address these issues. There is currently no further need for wound care specialist consultation at this time.

## 2018-10-11 NOTE — SWALLOW BEDSIDE ASSESSMENT ADULT - ASR SWALLOW ASPIRATION MONITOR
throat clearing/upper respiratory infection/oral hygiene/position upright (90Y)/gurgly voice/change of breathing pattern/cough/fever/pneumonia

## 2018-10-11 NOTE — PROGRESS NOTE ADULT - ATTENDING COMMENTS
Patient seen and examined this morning around 11.30 AM with family at bedside; Agree with PGY3 A/P above with editing as needed.  Discussed with Dr. Horn Patient seen and examined this morning around 11.30 AM with family at bedside; Agree with PGY3 A/P above with editing as needed.  Discussed with Dr. 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 He also has possible evidence of microaspiration with gurgling secretions. As per family was feeding pureed food at home. Patient unable to offer any complaints    Vital Signs Last 24 Hrs  T(C): 36.4 (11 Oct 2018 13:45), Max: 37 (11 Oct 2018 00:44)  T(F): 97.5 (11 Oct 2018 13:45), Max: 98.6 (11 Oct 2018 00:44)  HR: 73 (11 Oct 2018 13:45) (73 - 93)  BP: 100/52 (11 Oct 2018 13:45) (100/52 - 122/64)  RR: 18 (11 Oct 2018 13:45) (15 - 18)  SpO2: 96% (11 Oct 2018 13:45) (94% - 99%)    P/E: As above, Limited exam  Gen: Cachectic, Temporal wasting  Neuro: Non verbal unable to follow commands  CVS: S1S2 present, regular  Resp: BLAE+, Coarse Breath sounds B/L  Extr; No edema.    Labs:                        8.5    9.8   )-----------( 455      ( 11 Oct 2018 07:37 )             26.8   10-11    132<L>  |  102  |  15  ----------------------------<  163<H>  3.9   |  21<L>  |  0.89    Ca    8.0<L>      11 Oct 2018 07:37  Phos  2.9     10-11  Mg     2.1     10-11    TPro  7.2  /  Alb  2.2<L>  /  TBili  0.3  /  DBili  x   /  AST  32  /  ALT  29  /  AlkPhos  55  10-10    D/D: Patient seen and examined this morning around 11.30 AM with family at bedside; Agree with PGY3 A/P above with editing as needed.  Discussed with Dr. 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 He also has possible evidence of microaspiration with gurgling secretions. As per family was feeding pureed food at home. Patient unable to offer any complaints    Vital Signs Last 24 Hrs  T(C): 36.4 (11 Oct 2018 13:45), Max: 37 (11 Oct 2018 00:44)  T(F): 97.5 (11 Oct 2018 13:45), Max: 98.6 (11 Oct 2018 00:44)  HR: 73 (11 Oct 2018 13:45) (73 - 93)  BP: 100/52 (11 Oct 2018 13:45) (100/52 - 122/64)  RR: 18 (11 Oct 2018 13:45) (15 - 18)  SpO2: 96% (11 Oct 2018 13:45) (94% - 99%)    P/E: As above, Limited exam  Gen: Cachectic, Temporal wasting  Neuro: Non verbal unable to follow commands  CVS: S1S2 present, regular  Resp: BLAE+, Coarse Breath sounds B/L  Extr; No edema.    Labs:                        8.5    9.8   )-----------( 455      ( 11 Oct 2018 07:37 )             26.8   10-11    132<L>  |  102  |  15  ----------------------------<  163<H>  3.9   |  21<L>  |  0.89    Ca    8.0<L>      11 Oct 2018 07:37  Phos  2.9     10-11  Mg     2.1     10-11    TPro  7.2  /  Alb  2.2<L>  /  TBili  0.3  /  DBili  x   /  AST  32  /  ALT  29  /  AlkPhos  55  10-10    Sedimentation Rate, Erythrocyte (10.10.18 @ 17:50)    Sedimentation Rate, Erythrocyte: 82 mm/Hr      D/D:  1.  sepsis on admission (elevated lactate, leukocytosis,  tachycardia) s/t  2. cellulitis infected ulcer of right foot, and likely osteomyelitis of right great toe  3. suspected aspiration pneumonia, RLL consolidation  4. enterovirus infection/bronchitis  5. severe protein calorie malnutrition  6. advanced alzheimer's dementia bedbound status s/p CVA  7. dysphagia  8. debility    Plan:  -continue with zosyn and vancomycin; Vanco trough prior to 4th dose; Adjust Vanco as per dose  -ID consult appreciated d/w MICHELLE Zaidi and Dr. Zimmer  -MRI of right foot if able to tolerate if not may get CT, check with ID  -q 2 hour turns, HOB 45 degree,   -bronchodilators  - dysphagia puree diet.  -speech and swallow consult, was unable to fully evaluate this morning; please follow up  -add dietary supplements, nutrition consult  -DVT prophylaxis  -Prognosis guarded  -Advance directives d/w family at bedside (Daughter in law and Nephew); At this point wants resuscitation and Intubation if clinically indicated  As per family, they have been taking care of patient for years. No HHA  -palliative care consult d/w Dr. Bello will se in AM    Discussed with PGY3 Dr. Horn    I will be away 10/12-10/14/18; Hospitalist colleague to cover

## 2018-10-11 NOTE — PROGRESS NOTE ADULT - SUBJECTIVE AND OBJECTIVE BOX
Patient is a 86y old  Male who presents with a chief complaint of left foot ulcer (10 Oct 2018 19:50)      INTERVAL HPI/OVERNIGHT EVENTS: admitted for r/o right foot OM    T(C): 36.8 (10-11-18 @ 04:48), Max: 37.3 (10-10-18 @ 12:38)  HR: 90 (10-11-18 @ 04:48) (82 - 118)  BP: 122/64 (10-11-18 @ 04:48) (87/62 - 141/91)  RR: 18 (10-11-18 @ 04:48) (15 - 18)  SpO2: 97% (10-11-18 @ 04:48) (90% - 99%)  Wt(kg): --  I&O's Summary      LABS: 10/11/18 lab pending                        9.7    13.6  )-----------( 472      ( 10 Oct 2018 14:18 )             30.4     10-10    133<L>  |  98  |  20<H>  ----------------------------<  138<H>  4.3   |  25  |  1.21    Ca    7.8<L>      10 Oct 2018 14:18  < from: CT Chest No Cont (10.10.18 @ 21:20) >  IMPRESSION:   1. Small pericardial effusion.   2. Mild/moderate airspace disease/consolidation right lower lobe, with   associated bronchial wall thickening. Mild patchy somewhat nodular   airspace   disease right upper lobe.          ******PRELIMINARY REPORT******    ******PRELIMINARY REPORT******          < end of copied text >  Mg     2.0     10-10    TPro  7.2  /  Alb  2.2<L>  /  TBili  0.3  /  DBili  x   /  AST  32  /  ALT  29  /  AlkPhos  55  10-10    PT/INR - ( 10 Oct 2018 14:18 )   PT: 15.0 sec;   INR: 1.37 ratio         PTT - ( 10 Oct 2018 14:18 )  PTT:29.6 sec  Urinalysis Basic - ( 10 Oct 2018 14:40 )    Color: Yellow / Appearance: Clear / S.015 / pH: x  Gluc: x / Ketone: Negative  / Bili: Negative / Urobili: 4   Blood: x / Protein: 30 mg/dL / Nitrite: Negative   Leuk Esterase: Trace / RBC: Negative /HPF / WBC 0-2 /HPF   Sq Epi: x / Non Sq Epi: Moderate /HPF / Bacteria: Few /HPF      CAPILLARY BLOOD GLUCOSE        LIVER FUNCTIONS - ( 10 Oct 2018 14:18 )  Alb: 2.2 g/dL / Pro: 7.2 g/dL / ALK PHOS: 55 U/L / ALT: 29 U/L DA / AST: 32 U/L / GGT: x                   MEDICATIONS  (STANDING):  aspirin enteric coated 81 milliGRAM(s) Oral daily  enoxaparin Injectable 30 milliGRAM(s) SubCutaneous daily  finasteride 5 milliGRAM(s) Oral daily  influenza   Vaccine 0.5 milliLiter(s) IntraMuscular once  lisinopril 20 milliGRAM(s) Oral daily  piperacillin/tazobactam IVPB. 3.375 Gram(s) IV Intermittent every 12 hours  tamsulosin 0.4 milliGRAM(s) Oral at bedtime  vancomycin  IVPB 750 milliGRAM(s) IV Intermittent every 12 hours    MEDICATIONS  (PRN):      RADIOLOGY & ADDITIONAL TESTS:    Imaging Personally Reviewed:  [x ] YES  [ ] NO    Consultant(s) Notes Reviewed:  [ x] YES  [ ] NO    REVIEW OF SYSTEMS:  CONSTITUTIONAL: No fever, weight loss, or fatigue  EYES: No eye pain, visual disturbances, or discharge  ENMT:  No difficulty hearing, tinnitus, vertigo; No sinus or throat pain  NECK: No pain or stiffness  RESPIRATORY: No cough, wheezing, chills or hemoptysis; No shortness of breath  CARDIOVASCULAR: No chest pain, palpitations, dizziness, or leg swelling  GASTROINTESTINAL: No abdominal or epigastric pain. No nausea, vomiting, or hematemesis; No diarrhea or constipation. No melena or hematochezia.  GENITOURINARY: No dysuria, frequency, hematuria, or incontinence  NEUROLOGICAL: No headaches, memory loss, loss of strength, numbness, or tremors  ENDOCRINE: No heat or cold intolerance; No hair loss  MUSCULOSKELETAL: No joint pain or swelling; No muscle, back, or extremity pain      PHYSICAL EXAM:  GENERAL: NAD, well-groomed, well-developed  HEAD:  Atraumatic, Normocephalic  EYES: EOMI, PERRLA, conjunctiva and sclera clear  ENMT: No tonsillar erythema, exudates, or enlargement; Moist mucous membranes, Good dentition, No lesions  NECK: Supple, No JVD, Normal thyroid  NERVOUS SYSTEM:  demented  CHEST/LUNG: Clear to percussion bilaterally; No rales, rhonchi, wheezing, or rubs  HEART: Regular rate and rhythm; No murmurs, rubs, or gallops  ABDOMEN: Soft, Nontender, Nondistended; Bowel sounds present  EXTREMITIES:  2+ Peripheral Pulses bilaterally, No clubbing, cyanosis, or edema  LYMPH: No lymphadenopathy noted  SKIN: No rashes or lesions    Care Discussed with Consultants/Other Providers [ x] YES  [ ] NO Patient is a 86y old  Male who presents with a chief complaint of right foot ulcer (10 Oct 2018 19:50)      INTERVAL HPI/OVERNIGHT EVENTS: admitted for r/o right foot OM    T(C): 36.8 (10-11-18 @ 04:48), Max: 37.3 (10-10-18 @ 12:38)  HR: 90 (10-11-18 @ 04:48) (82 - 118)  BP: 122/64 (10-11-18 @ 04:48) (87/62 - 141/91)  RR: 18 (10-11-18 @ 04:48) (15 - 18)  SpO2: 97% (10-11-18 @ 04:48) (90% - 99%)  Wt(kg): --  I&O's Summary      LABS: 10/11/18 lab pending                        9.7    13.6  )-----------( 472      ( 10 Oct 2018 14:18 )             30.4     10-10    133<L>  |  98  |  20<H>  ----------------------------<  138<H>  4.3   |  25  |  1.21    Ca    7.8<L>      10 Oct 2018 14:18  < from: CT Chest No Cont (10.10.18 @ 21:20) >  IMPRESSION:   1. Small pericardial effusion.   2. Mild/moderate airspace disease/consolidation right lower lobe, with   associated bronchial wall thickening. Mild patchy somewhat nodular   airspace   disease right upper lobe.          ******PRELIMINARY REPORT******    ******PRELIMINARY REPORT******          < end of copied text >  Mg     2.0     10-10    TPro  7.2  /  Alb  2.2<L>  /  TBili  0.3  /  DBili  x   /  AST  32  /  ALT  29  /  AlkPhos  55  10-10    PT/INR - ( 10 Oct 2018 14:18 )   PT: 15.0 sec;   INR: 1.37 ratio         PTT - ( 10 Oct 2018 14:18 )  PTT:29.6 sec  Urinalysis Basic - ( 10 Oct 2018 14:40 )    Color: Yellow / Appearance: Clear / S.015 / pH: x  Gluc: x / Ketone: Negative  / Bili: Negative / Urobili: 4   Blood: x / Protein: 30 mg/dL / Nitrite: Negative   Leuk Esterase: Trace / RBC: Negative /HPF / WBC 0-2 /HPF   Sq Epi: x / Non Sq Epi: Moderate /HPF / Bacteria: Few /HPF      CAPILLARY BLOOD GLUCOSE        LIVER FUNCTIONS - ( 10 Oct 2018 14:18 )  Alb: 2.2 g/dL / Pro: 7.2 g/dL / ALK PHOS: 55 U/L / ALT: 29 U/L DA / AST: 32 U/L / GGT: x                   MEDICATIONS  (STANDING):  aspirin enteric coated 81 milliGRAM(s) Oral daily  enoxaparin Injectable 30 milliGRAM(s) SubCutaneous daily  finasteride 5 milliGRAM(s) Oral daily  influenza   Vaccine 0.5 milliLiter(s) IntraMuscular once  lisinopril 20 milliGRAM(s) Oral daily  piperacillin/tazobactam IVPB. 3.375 Gram(s) IV Intermittent every 12 hours  tamsulosin 0.4 milliGRAM(s) Oral at bedtime  vancomycin  IVPB 750 milliGRAM(s) IV Intermittent every 12 hours    MEDICATIONS  (PRN):      RADIOLOGY & ADDITIONAL TESTS:    Imaging Personally Reviewed:  [x ] YES  [ ] NO    Consultant(s) Notes Reviewed:  [ x] YES  [ ] NO    REVIEW OF SYSTEMS: Limited as patient is nonverbal and bedbound.  CONSTITUTIONAL: No fever  EYES: No discharge  NECK: No stiffness  RESPIRATORY: + sputum  CARDIOVASCULAR: No palpitations  GASTROINTESTINAL: No nausea, vomiting, or hematemesis; No diarrhea or constipation.  NEUROLOGICAL: No tremors  MUSCULOSKELETAL: right foor ulcer    PHYSICAL EXAM:  GENERAL: NAD, cachectic  HEAD:  Atraumatic, Normocephalic  EYES: PERRLA, conjunctiva and sclera clear  NECK: Supple, No JVD, Normal thyroid  NERVOUS SYSTEM:  demented  CHEST/LUNG: bilateral diffuse rhonchi, no rales, wheezing, or rubs  HEART: Regular rate and rhythm; No murmurs, rubs, or gallops  ABDOMEN: Soft, Nontender, Nondistended; Bowel sounds present  EXTREMITIES:  Right foot ulcers  LYMPH: No lymphadenopathy noted  SKIN: No rashes or lesions other than right foot ulcers    Care Discussed with Consultants/Other Providers [ x] YES  [ ] NO Patient is a 86y old  Male who presents with a chief complaint of right foot ulcer (10 Oct 2018 19:50)      INTERVAL HPI/OVERNIGHT EVENTS: admitted for r/o right foot OM    T(C): 36.8 (10-11-18 @ 04:48), Max: 37.3 (10-10-18 @ 12:38)  HR: 90 (10-11-18 @ 04:48) (82 - 118)  BP: 122/64 (10-11-18 @ 04:48) (87/62 - 141/91)  RR: 18 (10-11-18 @ 04:48) (15 - 18)  SpO2: 97% (10-11-18 @ 04:48) (90% - 99%)  Wt(kg): --  I&O's Summary      LABS: 10/11/18                            8.5    9.8   )-----------( 455      ( 11 Oct 2018 07:37 )             26.8     10-    132<L>  |  102  |  15  ----------------------------<  163<H>  3.9   |  21<L>  |  0.89    Ca    8.0<L>      11 Oct 2018 07:37  Phos  2.9     10-  Mg     2.1     10-11    TPro  7.2  /  Alb  2.2<L>  /  TBili  0.3  /  DBili  x   /  AST  32  /  ALT  29  /  AlkPhos  55  10-10                          9.7    13.6  )-----------( 472      ( 10 Oct 2018 14:18 )             30.4     10-10    133<L>  |  98  |  20<H>  ----------------------------<  138<H>  4.3   |  25  |  1.21    Ca    7.8<L>      10 Oct 2018 14:18  < from: CT Chest No Cont (10.10.18 @ 21:20) >  IMPRESSION:   1. Small pericardial effusion.   2. Mild/moderate airspace disease/consolidation right lower lobe, with   associated bronchial wall thickening. Mild patchy somewhat nodular   airspace   disease right upper lobe.          ******PRELIMINARY REPORT******    ******PRELIMINARY REPORT******          < end of copied text >  Mg     2.0     10-10    TPro  7.2  /  Alb  2.2<L>  /  TBili  0.3  /  DBili  x   /  AST  32  /  ALT  29  /  AlkPhos  55  10-10    PT/INR - ( 10 Oct 2018 14:18 )   PT: 15.0 sec;   INR: 1.37 ratio         PTT - ( 10 Oct 2018 14:18 )  PTT:29.6 sec  Urinalysis Basic - ( 10 Oct 2018 14:40 )    Color: Yellow / Appearance: Clear / S.015 / pH: x  Gluc: x / Ketone: Negative  / Bili: Negative / Urobili: 4   Blood: x / Protein: 30 mg/dL / Nitrite: Negative   Leuk Esterase: Trace / RBC: Negative /HPF / WBC 0-2 /HPF   Sq Epi: x / Non Sq Epi: Moderate /HPF / Bacteria: Few /HPF      CAPILLARY BLOOD GLUCOSE        LIVER FUNCTIONS - ( 10 Oct 2018 14:18 )  Alb: 2.2 g/dL / Pro: 7.2 g/dL / ALK PHOS: 55 U/L / ALT: 29 U/L DA / AST: 32 U/L / GGT: x                   MEDICATIONS  (STANDING):  aspirin enteric coated 81 milliGRAM(s) Oral daily  enoxaparin Injectable 30 milliGRAM(s) SubCutaneous daily  finasteride 5 milliGRAM(s) Oral daily  influenza   Vaccine 0.5 milliLiter(s) IntraMuscular once  lisinopril 20 milliGRAM(s) Oral daily  piperacillin/tazobactam IVPB. 3.375 Gram(s) IV Intermittent every 12 hours  tamsulosin 0.4 milliGRAM(s) Oral at bedtime  vancomycin  IVPB 750 milliGRAM(s) IV Intermittent every 12 hours    MEDICATIONS  (PRN):      RADIOLOGY & ADDITIONAL TESTS:    Imaging Personally Reviewed:  [x ] YES  [ ] NO    Consultant(s) Notes Reviewed:  [ x] YES  [ ] NO    REVIEW OF SYSTEMS: Limited as patient is nonverbal and bedbound.  CONSTITUTIONAL: No fever  EYES: No discharge  NECK: No stiffness  RESPIRATORY: + sputum  CARDIOVASCULAR: No palpitations  GASTROINTESTINAL: No nausea, vomiting, or hematemesis; No diarrhea or constipation.  NEUROLOGICAL: No tremors  MUSCULOSKELETAL: right foor ulcer    PHYSICAL EXAM:  GENERAL: NAD, cachectic  HEAD:  Atraumatic, Normocephalic  EYES: PERRLA, conjunctiva and sclera clear  NECK: Supple, No JVD, Normal thyroid  NERVOUS SYSTEM:  demented  CHEST/LUNG: bilateral diffuse rhonchi, no rales, wheezing, or rubs  HEART: Regular rate and rhythm; No murmurs, rubs, or gallops  ABDOMEN: Soft, Nontender, Nondistended; Bowel sounds present  EXTREMITIES:  Right foot ulcers  LYMPH: No lymphadenopathy noted  SKIN: No rashes or lesions other than right foot ulcers    Care Discussed with Consultants/Other Providers [ x] YES  [ ] NO

## 2018-10-11 NOTE — SWALLOW BEDSIDE ASSESSMENT ADULT - SWALLOW EVAL: DIAGNOSIS
Pt p/w oropharyngeal dysphagia c/b very poor oral grading & bolus formation, poor oral transit, poor A/P transport, very delayed swallow trigger (30s+), very limited hyolaryngeal elevation/excursion, multiple swallows, & lingual stasis. Pt suctioned after intake. No overt s/s of penetration/aspiration noted at this exam, however, silent aspiration is suspected.

## 2018-10-11 NOTE — SWALLOW BEDSIDE ASSESSMENT ADULT - COMMENTS
Consult received, chart, labs, radiology reviewed. Pt seen for bedside swallow evaluation. HOB elevated to 90 degrees. Pt legally blind, nonverbal, nonresponsive, does not follow commands. Nephew present at bedside. Breathing very wet/gurgly at baseline, open-mouth posture. Nephew notes pt eats alot, very good eater w/ maximal assistance, notes pt just received nebulizer & is therefore lethargic. Administered 1 bolus of puree. Pt HOB elevated to 90 degrees. Pt legally blind, nonverbal, nonresponsive, does not follow commands. Nephew present at bedside. Breathing very wet/gurgly at baseline, open-mouth posture. Nephew notes pt eats alot, very good eater w/ maximal assistance, notes pt just received nebulizer & is therefore lethargic. Administered 1 bolus of puree.

## 2018-10-11 NOTE — PROGRESS NOTE ADULT - PROBLEM SELECTOR PLAN 6
IMPROVE VTE Individual Risk Assessment          RISK                                                          Points  [  ] Previous VTE                                                3  [  ] Thrombophilia                                             2  [  ] Lower limb paralysis                                   2        (unable to hold up >15 seconds)    [  ] Current Cancer                                             2         (within 6 months)  [ x ] Immobilization > 24 hrs                              1  [  ] ICU/CCU stay > 24 hours                             1  [ x ] Age > 60                                                         1    IMPROVE VTE Score: 2  c/w subcu lovenox

## 2018-10-11 NOTE — SWALLOW BEDSIDE ASSESSMENT ADULT - SLP PERTINENT HISTORY OF CURRENT PROBLEM
86M PMHx of BPN, HTN, advanced Alzheimer's dementia; presents to ED c/o R foot ulceration x2 weeks. CT (+)RLL disease.

## 2018-10-12 ENCOUNTER — TRANSCRIPTION ENCOUNTER (OUTPATIENT)
Age: 83
End: 2018-10-12

## 2018-10-12 DIAGNOSIS — E43 UNSPECIFIED SEVERE PROTEIN-CALORIE MALNUTRITION: ICD-10-CM

## 2018-10-12 DIAGNOSIS — R53.2 FUNCTIONAL QUADRIPLEGIA: ICD-10-CM

## 2018-10-12 DIAGNOSIS — Z51.5 ENCOUNTER FOR PALLIATIVE CARE: ICD-10-CM

## 2018-10-12 LAB
ANION GAP SERPL CALC-SCNC: 9 MMOL/L — SIGNIFICANT CHANGE UP (ref 5–17)
BASOPHILS # BLD AUTO: 0 K/UL — SIGNIFICANT CHANGE UP (ref 0–0.2)
BASOPHILS NFR BLD AUTO: 0.3 % — SIGNIFICANT CHANGE UP (ref 0–2)
BUN SERPL-MCNC: 12 MG/DL — SIGNIFICANT CHANGE UP (ref 7–18)
CALCIUM SERPL-MCNC: 8 MG/DL — LOW (ref 8.4–10.5)
CHLORIDE SERPL-SCNC: 107 MMOL/L — SIGNIFICANT CHANGE UP (ref 96–108)
CO2 SERPL-SCNC: 22 MMOL/L — SIGNIFICANT CHANGE UP (ref 22–31)
CREAT SERPL-MCNC: 1.01 MG/DL — SIGNIFICANT CHANGE UP (ref 0.5–1.3)
CULTURE RESULTS: NO GROWTH — SIGNIFICANT CHANGE UP
EOSINOPHIL # BLD AUTO: 0 K/UL — SIGNIFICANT CHANGE UP (ref 0–0.5)
EOSINOPHIL NFR BLD AUTO: 0 % — SIGNIFICANT CHANGE UP (ref 0–6)
FERRITIN SERPL-MCNC: 124 NG/ML — SIGNIFICANT CHANGE UP (ref 30–400)
FOLATE SERPL-MCNC: 17.1 NG/ML — SIGNIFICANT CHANGE UP
GLUCOSE BLDC GLUCOMTR-MCNC: 151 MG/DL — HIGH (ref 70–99)
GLUCOSE SERPL-MCNC: 125 MG/DL — HIGH (ref 70–99)
HAPTOGLOB SERPL-MCNC: 341 MG/DL — HIGH (ref 34–200)
HCT VFR BLD CALC: 26 % — LOW (ref 39–50)
HGB BLD-MCNC: 8.2 G/DL — LOW (ref 13–17)
IRON SATN MFR SERPL: 15 UG/DL — LOW (ref 65–170)
IRON SATN MFR SERPL: 8 % — LOW (ref 20–55)
LDH SERPL L TO P-CCNC: 196 U/L — SIGNIFICANT CHANGE UP (ref 120–225)
LYMPHOCYTES # BLD AUTO: 1.7 K/UL — SIGNIFICANT CHANGE UP (ref 1–3.3)
LYMPHOCYTES # BLD AUTO: 10.5 % — LOW (ref 13–44)
MAGNESIUM SERPL-MCNC: 1.9 MG/DL — SIGNIFICANT CHANGE UP (ref 1.6–2.6)
MCHC RBC-ENTMCNC: 27.2 PG — SIGNIFICANT CHANGE UP (ref 27–34)
MCHC RBC-ENTMCNC: 31.5 GM/DL — LOW (ref 32–36)
MCV RBC AUTO: 86.4 FL — SIGNIFICANT CHANGE UP (ref 80–100)
MONOCYTES # BLD AUTO: 1.1 K/UL — HIGH (ref 0–0.9)
MONOCYTES NFR BLD AUTO: 7 % — SIGNIFICANT CHANGE UP (ref 2–14)
NEUTROPHILS # BLD AUTO: 13.4 K/UL — HIGH (ref 1.8–7.4)
NEUTROPHILS NFR BLD AUTO: 82.2 % — HIGH (ref 43–77)
PHOSPHATE SERPL-MCNC: 2.6 MG/DL — SIGNIFICANT CHANGE UP (ref 2.5–4.5)
PLATELET # BLD AUTO: 462 K/UL — HIGH (ref 150–400)
POTASSIUM SERPL-MCNC: 3.1 MMOL/L — LOW (ref 3.5–5.3)
POTASSIUM SERPL-SCNC: 3.1 MMOL/L — LOW (ref 3.5–5.3)
RBC # BLD: 3.01 M/UL — LOW (ref 4.2–5.8)
RBC # FLD: 13.8 % — SIGNIFICANT CHANGE UP (ref 10.3–14.5)
SODIUM SERPL-SCNC: 138 MMOL/L — SIGNIFICANT CHANGE UP (ref 135–145)
SPECIMEN SOURCE: SIGNIFICANT CHANGE UP
T4 FREE SERPL-MCNC: 1.2 NG/DL — SIGNIFICANT CHANGE UP (ref 0.9–1.8)
TIBC SERPL-MCNC: 187 UG/DL — LOW (ref 250–450)
UIBC SERPL-MCNC: 172 UG/DL — SIGNIFICANT CHANGE UP (ref 110–370)
VANCOMYCIN TROUGH SERPL-MCNC: 9.5 UG/ML — LOW (ref 10–20)
VIT B12 SERPL-MCNC: 1324 PG/ML — HIGH (ref 232–1245)
WBC # BLD: 16.3 K/UL — HIGH (ref 3.8–10.5)
WBC # FLD AUTO: 16.3 K/UL — HIGH (ref 3.8–10.5)

## 2018-10-12 PROCEDURE — 73718 MRI LOWER EXTREMITY W/O DYE: CPT | Mod: 26,RT

## 2018-10-12 PROCEDURE — 99223 1ST HOSP IP/OBS HIGH 75: CPT

## 2018-10-12 PROCEDURE — 99233 SBSQ HOSP IP/OBS HIGH 50: CPT | Mod: GC

## 2018-10-12 RX ORDER — VANCOMYCIN HCL 1 G
1000 VIAL (EA) INTRAVENOUS EVERY 12 HOURS
Qty: 0 | Refills: 0 | Status: DISCONTINUED | OUTPATIENT
Start: 2018-10-12 | End: 2018-10-14

## 2018-10-12 RX ORDER — POTASSIUM CHLORIDE 20 MEQ
10 PACKET (EA) ORAL
Qty: 0 | Refills: 0 | Status: COMPLETED | OUTPATIENT
Start: 2018-10-12 | End: 2018-10-12

## 2018-10-12 RX ORDER — SODIUM CHLORIDE 9 MG/ML
1000 INJECTION, SOLUTION INTRAVENOUS
Qty: 0 | Refills: 0 | Status: DISCONTINUED | OUTPATIENT
Start: 2018-10-12 | End: 2018-10-12

## 2018-10-12 RX ORDER — DEXTROSE MONOHYDRATE, SODIUM CHLORIDE, AND POTASSIUM CHLORIDE 50; .745; 4.5 G/1000ML; G/1000ML; G/1000ML
1000 INJECTION, SOLUTION INTRAVENOUS
Qty: 0 | Refills: 0 | Status: DISCONTINUED | OUTPATIENT
Start: 2018-10-12 | End: 2018-10-17

## 2018-10-12 RX ADMIN — Medication 250 MILLIGRAM(S): at 08:24

## 2018-10-12 RX ADMIN — Medication 100 MILLIEQUIVALENT(S): at 16:00

## 2018-10-12 RX ADMIN — ENOXAPARIN SODIUM 30 MILLIGRAM(S): 100 INJECTION SUBCUTANEOUS at 17:34

## 2018-10-12 RX ADMIN — Medication 100 MILLIEQUIVALENT(S): at 10:34

## 2018-10-12 RX ADMIN — Medication 2.5 MILLILITER(S): at 15:44

## 2018-10-12 RX ADMIN — Medication 3 MILLILITER(S): at 15:44

## 2018-10-12 RX ADMIN — PIPERACILLIN AND TAZOBACTAM 25 GRAM(S): 4; .5 INJECTION, POWDER, LYOPHILIZED, FOR SOLUTION INTRAVENOUS at 01:00

## 2018-10-12 RX ADMIN — Medication 3 MILLILITER(S): at 03:01

## 2018-10-12 RX ADMIN — Medication 2.5 MILLILITER(S): at 09:33

## 2018-10-12 RX ADMIN — Medication 3 MILLILITER(S): at 09:34

## 2018-10-12 RX ADMIN — PIPERACILLIN AND TAZOBACTAM 25 GRAM(S): 4; .5 INJECTION, POWDER, LYOPHILIZED, FOR SOLUTION INTRAVENOUS at 18:56

## 2018-10-12 RX ADMIN — Medication 2.5 MILLILITER(S): at 20:45

## 2018-10-12 RX ADMIN — SODIUM CHLORIDE 75 MILLILITER(S): 9 INJECTION, SOLUTION INTRAVENOUS at 06:45

## 2018-10-12 RX ADMIN — Medication 100 MILLIEQUIVALENT(S): at 12:15

## 2018-10-12 RX ADMIN — Medication 2.5 MILLILITER(S): at 03:01

## 2018-10-12 RX ADMIN — Medication 250 MILLIGRAM(S): at 21:50

## 2018-10-12 RX ADMIN — PIPERACILLIN AND TAZOBACTAM 25 GRAM(S): 4; .5 INJECTION, POWDER, LYOPHILIZED, FOR SOLUTION INTRAVENOUS at 10:34

## 2018-10-12 RX ADMIN — Medication 3 MILLILITER(S): at 20:46

## 2018-10-12 RX ADMIN — SODIUM CHLORIDE 75 MILLILITER(S): 9 INJECTION, SOLUTION INTRAVENOUS at 10:46

## 2018-10-12 NOTE — CONSULT NOTE ADULT - PROBLEM SELECTOR RECOMMENDATION 4
2/2 advanced dementia. Failed SLP, currently npo. Family wishes to continue w IVF for now, does not want NGT for now. They will discuss possible need for PEG if no improvement.

## 2018-10-12 NOTE — DISCHARGE NOTE ADULT - HOSPITAL COURSE
86 years old M Pt with PMH of BPH, HTN, and Advanced Dementia presented to ED c/o right foot ulceration x 2 weeks. Pt visited podiatrist yesterday for a worsening right foot ulceration, located by his big toe form where he was sent to hospital for further work up. Patient was seen in ED by podiatry who recommended admission to the hospital for suspected osteomyelitis.     In ED, stat dose of vanco and cefepime was given. Xray foot showed lucency at the head of the first metatarsal which may represent osteomyelitis. Further imaging with MRI was recommended by podiatry to determine the need for surgery/debridement.  Dr. Mesha GILL was consulted and recommended continuation of vancomycin and zosyn. Patient was afebrile during hospital stay and blood culture was negative.    On admission, Xray chest showed questionable additional density superimposing with anterior right first rib. CT of the chest was performed and showed mild/moderate airspace disease/consolidation right lower lobe, with associated bronchial wall thickening. Mild patchy somewhat nodular airspace disease right upper lobe. Patient was noted to have diffuse bilateral rhonchi with impaired swallowing function on physical exam. Aspiration pneumonia was strongly suspected and patient was evaluated by speech and swallow team who recommended total NPO. RVP was also positive for enterovirus.      Due to his poor prognosis, palliative team was consulted for potential need for PEG tube placement. 86 years old M Pt with PMH of BPH, HTN, and Advanced Dementia presented to ED c/o right foot ulceration x 2 weeks. Pt visited podiatrist yesterday for a worsening right foot ulceration, located by his big toe form where he was sent to hospital for further work up. Patient was seen in ED by podiatry who recommended admission to the hospital for suspected osteomyelitis.     In ED, stat dose of vanco and cefepime was given. Xray foot showed lucency at the head of the first metatarsal which may represent osteomyelitis. Further imaging with MRI was recommended by podiatry to determine the need for surgery/debridement. It was again positive for osteomyelitis, and podiatry team recommended surgical intervention (toe amputation) for it. Patient's daughter (who is a HCP) decided ***  Dr. Mesha GILL was consulted and recommended continuation of vancomycin and zosyn. Patient was afebrile during hospital stay and blood culture was negative.    On admission, Xray chest showed questionable additional density superimposing with anterior right first rib. CT of the chest was performed and showed mild/moderate airspace disease/consolidation right lower lobe, with associated bronchial wall thickening. Mild patchy somewhat nodular airspace disease right upper lobe. Patient was noted to have diffuse bilateral rhonchi with impaired swallowing function on physical exam. Aspiration pneumonia was strongly suspected and patient was evaluated by speech and swallow team who recommended total NPO. RVP was also positive for enterovirus.      Due to his poor prognosis, palliative team was consulted for potential need for PEG tube placement. As family wished, patient underwent procedure for PEG tube placement. 86 years old M Pt with PMH of BPH, HTN, and Advanced Dementia presented to ED c/o right foot ulceration x 2 weeks. Pt visited podiatrist yesterday for a worsening right foot ulceration, located by his big toe form where he was sent to hospital for further work up. Patient was seen in ED by podiatry who recommended admission to the hospital for suspected osteomyelitis.     In ED, stat dose of vanco and cefepime was given. Xray foot showed lucency at the head of the first metatarsal which may represent osteomyelitis. Further imaging with MRI was recommended by podiatry to determine the need for surgery/debridement. It was again positive for osteomyelitis, and podiatry team did not recommend surgical intervention (toe amputation) for it as risk outweighs the benefit. HCP agreed with the plan.  Dr. Mesha GILL was consulted and recommended continuation of vancomycin and zosyn  while in patient. Patient was afebrile during hospital stay and blood culture was negative. He will be discharged with Clindamycin PO for total of 6 weeks.    On admission, Xray chest showed questionable additional density superimposing with anterior right first rib. CT of the chest was performed and showed mild/moderate airspace disease/consolidation right lower lobe, with associated bronchial wall thickening. Mild patchy somewhat nodular airspace disease right upper lobe. Patient was noted to have diffuse bilateral rhonchi with impaired swallowing function on physical exam. Aspiration pneumonia was strongly suspected and patient was evaluated by speech and swallow team who recommended total NPO. RVP was also positive for enterovirus. It is likely due to impaired swallowing ability due to advanced dementia. Due to his poor prognosis, palliative team was consulted for potential need for PEG tube placement. As family wished, patient underwent procedure for PEG tube placement. 86 years old M Pt with PMH of BPH, HTN, and Advanced Dementia presented to ED c/o right foot ulceration x 2 weeks. Pt visited podiatrist yesterday for a worsening right foot ulceration, located by his big toe form where he was sent to hospital for further work up. Patient was seen in ED by podiatry who recommended admission to the hospital for suspected osteomyelitis.     In ED, stat dose of vanco and cefepime was given. Xray foot showed lucency at the head of the first metatarsal which may represent osteomyelitis. Further imaging with MRI was recommended by podiatry to determine the need for surgery/debridement. It was again positive for osteomyelitis, and podiatry team did not recommend surgical intervention (toe amputation) for it as risk outweighs the benefit. HCP agreed with the plan.  Dr. Mesha GILL was consulted and recommended continuation of vancomycin and zosyn  while in patient. Patient was afebrile during hospital stay and blood culture was negative. He will be discharged with Clindamycin PO for total of 6 weeks.    On admission, Xray chest showed questionable additional density superimposing with anterior right first rib. CT of the chest was performed and showed mild/moderate airspace disease/consolidation right lower lobe, with associated bronchial wall thickening. Mild patchy somewhat nodular airspace disease right upper lobe. Patient was noted to have diffuse bilateral rhonchi with impaired swallowing function on physical exam. Aspiration pneumonia was strongly suspected and patient was evaluated by speech and swallow team who recommended total NPO. RVP was also positive for enterovirus. It is likely due to impaired swallowing ability due to advanced dementia. Due to his poor prognosis, palliative team was consulted for potential need for PEG tube placement. As family wished, patient underwent procedure for PEG tube placement.    As per family wish, patient will be discharged home with PO meds for osteomyelitis. Patient's respiratory symptoms significantly improved as well.     For further details about the hospital course, please refer to the entire medical records as this is a brief summary. 86 years old M Pt with PMH of BPH, HTN, and Advanced Dementia presented to ED c/o right foot ulceration x 2 weeks. Pt visited podiatrist yesterday for a worsening right foot ulceration, located by his big toe form where he was sent to hospital for further work up. Patient was seen in ED by podiatry who recommended admission to the hospital for suspected osteomyelitis.     In ED, stat dose of vanco and cefepime was given. Xray foot showed lucency at the head of the first metatarsal which may represent osteomyelitis. Further imaging with MRI was recommended by podiatry to determine the need for surgery/debridement. It was again positive for osteomyelitis, and podiatry team did not recommend surgical intervention (toe amputation) for it as risk outweighs the benefit. HCP agreed with the plan.  Dr. Mesha GILL was consulted and recommended continuation of vancomycin and zosyn  while in patient. Patient was afebrile during hospital stay and blood culture was negative. He will be discharged with Clindamycin PO for total of 6 weeks.    On admission, Xray chest showed questionable additional density superimposing with anterior right first rib. CT of the chest was performed and showed mild/moderate airspace disease/consolidation right lower lobe, with associated bronchial wall thickening. Mild patchy somewhat nodular airspace disease right upper lobe. Patient was noted to have diffuse bilateral rhonchi with impaired swallowing function on physical exam. Aspiration pneumonia was strongly suspected and patient was evaluated by speech and swallow team who recommended total NPO. RVP was also positive for enterovirus. It is likely due to impaired swallowing ability due to advanced dementia. Due to his poor prognosis, palliative team was consulted for potential need for PEG tube placement. As family wished, patient underwent procedure for PEG tube placement. Patient finished 9 days of Zosyn for suspected aspiration pneumonia.    As per family wish, patient will be discharged home with PO clindamycin for osteomyelitis as recommended by ID. Patient's respiratory symptoms significantly improved as well.     For further details about the hospital course, please refer to the entire medical records as this is a brief summary.

## 2018-10-12 NOTE — CONSULT NOTE ADULT - PROBLEM SELECTOR RECOMMENDATION 9
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 No behavioral issues at present

## 2018-10-12 NOTE — DIETITIAN INITIAL EVALUATION ADULT. - PHYSICAL APPEARANCE
debilitated/emaciated/other (specify)/BMI 15, Nutrition focused physical exam conducted:  Subcutaneous fat loss: [Severe]  Orbital fat pads region, [Severe ] Buccal fat region, [Severe ] Triceps region,  [ ]Ribs region.  Muscle wasting: [Severe] Temples region, [Severe] Clavicle region, [ ]Shoulder region, [Severe] Scapula region, [Severe ] Interosseous region, [Severe] thigh region, [Severe]Calf region

## 2018-10-12 NOTE — PROGRESS NOTE ADULT - PROBLEM SELECTOR PLAN 1
-Was sent from podiatry for foot ulcer; f/u with Podiatry. MRI pending.  -Xray foot: Lucency at the head of the first metatarsal which may represent osteomyelitis  -ID Dr. Zimmer: continue with vanco and zosyn   -follow up MRI result**  -f/u with podiatry  -f/u blood culture result.  -Afebrile, pt has leukocytosis. -Was sent from podiatry for foot ulcer; f/u with Podiatry. MRI pending.  -Xray foot: Lucency at the head of the first metatarsal which may represent osteomyelitis  -ID Dr. Zimmer: continue with vanco and zosyn. Vancotrough was low, however based on EGFR today, will keep the dose 750mg q12hrs and recheck vancotrough after 4th dose.   -follow up MRI result**  -f/u with podiatry  -f/u blood culture result.  -Afebrile, pt has leukocytosis. -Was sent from podiatry for foot ulcer; f/u with Podiatry. MRI pending.  -Xray foot: Lucency at the head of the first metatarsal which may represent osteomyelitis Rt  -ID Dr. Zimmer: continue with vanco and zosyn. Vancotrough was low, however based on EGFR today, will keep the dose 750mg q12hrs and recheck vancotrough after 4th dose.   -follow up MRI result**  -f/u with podiatry  -f/u blood culture result.  -Afebrile, pt has leukocytosis. -Was sent from podiatry for foot ulcer; f/u with Podiatry. MRI pending.  -Xray foot: Lucency at the head of the first metatarsal which may represent osteomyelitis Rt  -ID Dr. Zimmer: continue with vanco and zosyn. Vancotrough was low, however based on EGFR today, will keep the dose 750mg q12hrs and recheck vancotrough after 4th dose.   -follow up MRI result**  -f/u with podiatry: they will decide if patient needs surgery/debridement based on the MRI result. Study will be done today.  -Negative blood culture.  -Afebrile, pt has leukocytosis. -Was sent from podiatry for foot ulcer; f/u with Podiatry. MRI pending.  -Xray foot: Lucency at the head of the first metatarsal which may represent osteomyelitis Rt  -ID Dr. Zimmer: continue with vanco and zosyn. Vancotrough was low, increased the dose to 1000mg q12hrs and recheck vancotrough after 4th dose.   -follow up MRI result**  -f/u with podiatry: they will decide if patient needs surgery/debridement based on the MRI result. Study will be done today.  -Negative blood culture.  -Afebrile, pt has leukocytosis.

## 2018-10-12 NOTE — PROGRESS NOTE ADULT - PROBLEM SELECTOR PLAN 2
-Xray chest: Question additional density superimposing with anterior right   first rib.  -CT chest: Mild/moderate airspace disease/consolidation right lower lobe, with   associated bronchial wall thickening. Mild patchy somewhat nodular airspace   disease right upper lobe. (prelim)  -RVP positive  -Pt will be covered by broad spectrum ABx for foot ulcer, which can cover possible superimposed bacterial pneumonia.  -Patient is also at very high risk of aspiration, and has diffuse rhonchi on bilateral lung field. Speech and swallow evaluation recommends NPO, aspiration precautions.  -Keep pt NPO for now, c/w IV fluid.  -Monitor Sx clinically. -Xray chest: Question additional density superimposing with anterior right   first rib.  -CT chest: Mild/moderate airspace disease/consolidation right lower lobe, with   associated bronchial wall thickening. Mild patchy somewhat nodular airspace   disease right upper lobe. (prelim)  -RVP positive  -Pt will be covered by broad spectrum ABx for foot ulcer, which can cover possible superimposed bacterial pneumonia.  -Patient is also at very high risk of aspiration, and has diffuse rhonchi on bilateral lung field. Speech and swallow evaluation recommends NPO, aspiration precautions.  -Keep pt NPO for now, c/w IV fluid. Monitor fingersticks while in NPO.  -Monitor Sx clinically. -Xray chest: Question additional density superimposing with anterior right   first rib.  -CT chest: Mild/moderate airspace disease/consolidation right lower lobe, with   associated bronchial wall thickening. Mild patchy somewhat nodular airspace   disease right upper lobe. (prelim)  -RVP positive  -Pt will be covered by broad spectrum ABx for foot ulcer, which can cover possible superimposed bacterial pneumonia.  -Patient is also at very high risk of aspiration, and has diffuse rhonchi on bilateral lung field. Speech and swallow evaluation recommends NPO, aspiration precautions.  -Keep pt NPO for now, c/w IV fluid. Monitor fingersticks while in NPO.  -Monitor Sx clinically.  -Brief discussion was done with nephew at the bedside, that patient may need PEG tube if they want everything to be done. Awaiting palliative care team's input; Dr. Bello will see the patient today.

## 2018-10-12 NOTE — DISCHARGE NOTE ADULT - CARE PLAN
Principal Discharge DX:	Osteomyelitis  Goal:	Treat infection  Assessment and plan of treatment:	Continue IV antibiotics as prescribed.  Patient has acute osteomyelitis shown in radiology studies.  Patient was seen by podiatrist while inpatient.  F/U with podiatry as an outpatient, and wound care.  Secondary Diagnosis:	Other pneumonia, unspecified organism  Assessment and plan of treatment:	Patient is at high risk of aspiration pneumonia and did not pass speech and swallow.  Patient will be getting antibiotics for osteomyelitis, and this will cover his pneumonia as well.  Aspiration precautions  Secondary Diagnosis:	Advanced dementia  Assessment and plan of treatment:	Aspiration precautions  Secondary Diagnosis:	BPH (benign prostatic hyperplasia)  Assessment and plan of treatment:	Continue Finasteride and Flomax Principal Discharge DX:	Osteomyelitis  Goal:	Treat infection  Assessment and plan of treatment:	Continue oral antibiotics as prescribed; Clindamycin 300mg 3 times a day for 33 more days(total of 42days).  Patient has acute osteomyelitis shown in radiology studies.  Patient was seen by podiatrist while inpatient. Not a good candidate for the surgery.  F/U with podiatry as an outpatient, and wound care.  Secondary Diagnosis:	Other pneumonia, unspecified organism  Assessment and plan of treatment:	Patient is at high risk of aspiration pneumonia and did not pass speech and swallow.  Patient underwent PEG tube insertion.  Aspiration precautions, elevate head to 30-45 degree.  Secondary Diagnosis:	Advanced dementia  Assessment and plan of treatment:	Aspiration precautions, no medication indicated.  Secondary Diagnosis:	BPH (benign prostatic hyperplasia)  Assessment and plan of treatment:	Continue Finasteride and Flomax via PEG tube. Principal Discharge DX:	Osteomyelitis  Goal:	Treat infection  Assessment and plan of treatment:	Continue oral antibiotics as prescribed; Clindamycin 300mg 3 times a day for 33 more days(total of 42days).  Patient has acute osteomyelitis shown in radiology studies.  Patient was seen by podiatrist while inpatient. Not a good candidate for the surgery.  F/U with podiatry as an outpatient, and wound care.  Secondary Diagnosis:	Other pneumonia, unspecified organism  Goal:	Treat infection  Assessment and plan of treatment:	Patient is at high risk of aspiration pneumonia and did not pass speech and swallow. He finished 9 days of antibiotics for suspected aspiration pneumonia.  Patient underwent PEG tube insertion.  Aspiration precautions, elevate head to 30-45 degree.  Secondary Diagnosis:	Advanced dementia  Assessment and plan of treatment:	Aspiration precautions, no medication indicated.  Secondary Diagnosis:	BPH (benign prostatic hyperplasia)  Assessment and plan of treatment:	Continue Finasteride and Flomax via PEG tube. Principal Discharge DX:	Aspiration pneumonia of right lower lobe, unspecified aspiration pneumonia type  Goal:	Resolution of infection.  Assessment and plan of treatment:	You was brought in for ulcers in the feet. You was found to have significant upper airway secretions and due to your advanced dementia and inability to clear secretions was found to have Aspiration Pneumonia of Right lower lobe of the lung. You was treated with Broad spectrum Antibiotics Zosyn which was selected to cover your foot ulcers.   Patient is at high risk of aspiration pneumonia and did not pass speech and swallow. He finished 9 days of antibiotics for suspected aspiration pneumonia.  Patient underwent PEG tube insertion to minimize risk of Aspiration.   Aspiration precautions, elevate head to 30-45 degree.  Secondary Diagnosis:	Osteomyelitis  Goal:	Treat underlying infection and prevent worsening.  Assessment and plan of treatment:	You was found to hav  Continue oral antibiotics as prescribed; Clindamycin 300mg 3 times a day for 33 more days(total of 42days).  Patient has acute osteomyelitis shown in radiology studies.    Patient was seen by podiatrist while inpatient. Not a good candidate for the surgery.  F/U with podiatry as an outpatient, and wound care.  Secondary Diagnosis:	Advanced dementia  Goal:	Supportive care  Assessment and plan of treatment:	You have Advanced dementia and has been bed bound for few years.   Aspiration precautions and keep Head of bed elevated at least 45 degrees while feeding and for 30 mins to an hour after to prevent aspiration.  Secondary Diagnosis:	BPH (benign prostatic hyperplasia)  Goal:	Adequate urination and to prevent infections and complications  Assessment and plan of treatment:	Continue Finasteride and Flomax via PEG tube.  Secondary Diagnosis:	Goals of care, counseling/discussion  Goal:	Provide comfort and respect patient and family wishes  Assessment and plan of treatment:	You have advanced dementia with bed bound status with multiple comorbidities and overall long term prognosis and quality of life is poor. We discussed with your Health Care Proxy and family goals of care. You was also seen by Palliative care team who also discussed Goals of care. Your family at this time would like to attempt all interventions and resuscitative measures.  Secondary Diagnosis:	Severe protein-calorie malnutrition  Goal:	Improve nutritional status  Assessment and plan of treatment:	Due to decreased oral intake;  You also had Aspiration risk.   Your family agreed for a feeding tube which was placed and started on feeding which you tolerated well.  Dietary consulted. You are recommended to receive total 4 cans of Jevity 1.5 daily as tolerated.  Please flush with water before and after feeds.  Please note that a feeding tube may minimize risk of Aspiration but not eliminated as you could still have microaspiration from upper airway secretions hence follow precautions as advised Principal Discharge DX:	Aspiration pneumonia of right lower lobe, unspecified aspiration pneumonia type  Goal:	Resolution of infection.  Assessment and plan of treatment:	You was brought in for ulcers in the feet. You was found to have significant upper airway secretions and due to your advanced dementia and inability to clear secretions was found to have Aspiration Pneumonia of Right lower lobe of the lung. You was treated with Broad spectrum Antibiotics Zosyn which was selected to cover your foot ulcers.   Patient is at high risk of aspiration pneumonia and did not pass speech and swallow. He finished 9 days of antibiotics for suspected aspiration pneumonia.  Patient underwent PEG tube insertion to minimize risk of Aspiration.   Aspiration precautions, elevate head to 30-45 degree.  Secondary Diagnosis:	Osteomyelitis  Goal:	Treat underlying infection and prevent worsening.  Assessment and plan of treatment:	You had an MRI which showed findings consistent with osteomyelitis in the distal first metatarsal   neck region, throughout the first metatarsal head and throughout the medial hallux sesamoid. Osteomyelitis is also suspected at the base of the first proximal phalanx as well as within the lateral hallux sesamoid. There is surrounding cellulitis. There is no drainable fluid collection or abscess.  You was treated with vancomycin and Zosyn for 9 days; You was seen by Infectious Diseases Dr. Angela Zimmer  who recommended completing 6 weks of treatment. Continue oral antibiotics as prescribed; Clindamycin 300mg 3 times a day for 33 more days(total of 42days). We requested for solution as easy to administer via PEG. Your pharmacy will be dispensing 2 week at a time as reconstituted solution is good for 2 weeks. Patient was seen by podiatrist Dr. Almeida while inpatient. Not a good candidate for the surgery given his bed bound status and comorbidities.  F/U with podiatry as an outpatient, and wound care at home via [facility]  Secondary Diagnosis:	Advanced dementia  Goal:	Supportive care  Assessment and plan of treatment:	You have Advanced dementia and has been bed bound for few years.   Aspiration precautions and keep Head of bed elevated at least 45 degrees while feeding and for 30 mins to an hour after to prevent aspiration.  Secondary Diagnosis:	BPH (benign prostatic hyperplasia)  Goal:	Adequate urination and to prevent infections and complications  Assessment and plan of treatment:	Continue Finasteride and Flomax via PEG tube.  Secondary Diagnosis:	Goals of care, counseling/discussion  Goal:	Provide comfort and respect patient and family wishes  Assessment and plan of treatment:	You have advanced dementia with bed bound status with multiple comorbidities and overall long term prognosis and quality of life is poor. We discussed with your Health Care Proxy and family goals of care. You was also seen by Palliative care team who also discussed Goals of care. Your family at this time would like to attempt all interventions and resuscitative measures.  Secondary Diagnosis:	Severe protein-calorie malnutrition  Goal:	Improve nutritional status  Assessment and plan of treatment:	Due to decreased oral intake;  You also had Aspiration risk.   Your family agreed for a feeding tube which was placed and started on feeding which you tolerated well.  Dietary consulted. You are recommended to receive total 4 cans of Jevity 1.5 daily as tolerated.  Please flush with water before and after feeds.  Please note that a feeding tube may minimize risk of Aspiration but not eliminated as you could still have microaspiration from upper airway secretions hence follow precautions as advised Principal Discharge DX:	Aspiration pneumonia of right lower lobe, unspecified aspiration pneumonia type  Goal:	Resolution of infection.  Assessment and plan of treatment:	You was brought in for ulcers in the feet. You was found to have significant upper airway secretions and due to your advanced dementia and inability to clear secretions was found to have Aspiration Pneumonia of Right lower lobe of the lung. You was treated with Broad spectrum Antibiotics Zosyn which was selected to cover your foot ulcers.   Patient is at high risk of aspiration pneumonia and did not pass speech and swallow. He finished 9 days of antibiotics for suspected aspiration pneumonia.  Patient underwent PEG tube insertion to minimize risk of Aspiration.   Aspiration precautions, elevate head to 30-45 degree.  Secondary Diagnosis:	Osteomyelitis  Goal:	Treat underlying infection and prevent worsening.  Assessment and plan of treatment:	You had an MRI which showed findings consistent with osteomyelitis in the distal first metatarsal   neck region, throughout the first metatarsal head and throughout the medial hallux sesamoid. Osteomyelitis is also suspected at the base of the first proximal phalanx as well as within the lateral hallux sesamoid. There is surrounding cellulitis. There is no drainable fluid collection or abscess.  You was treated with vancomycin and Zosyn for 9 days; You was seen by Infectious Diseases Dr. Angela Zimmer  who recommended completing 6 weks of treatment. Continue oral antibiotics as prescribed; Clindamycin 300mg 3 times a day for 33 more days(total of 42days). We requested for solution as easy to administer via PEG. Your pharmacy will be dispensing 2 week at a time as reconstituted solution is good for 2 weeks. Patient was seen by podiatrist Dr. Almeida while inpatient. Not a good candidate for the surgery given his bed bound status and comorbidities.  F/U with podiatry as an outpatient, and wound care at home via [facility]  Secondary Diagnosis:	Advanced dementia  Goal:	Supportive care  Assessment and plan of treatment:	You have Advanced dementia and has been bed bound for few years.   Aspiration precautions and keep Head of bed elevated at least 45 degrees while feeding and for 30 mins to an hour after to prevent aspiration.  Secondary Diagnosis:	BPH (benign prostatic hyperplasia)  Goal:	Adequate urination and to prevent infections and complications  Assessment and plan of treatment:	Continue Finasteride and Flomax via PEG tube.  Secondary Diagnosis:	Goals of care, counseling/discussion  Goal:	Provide comfort and respect patient and family wishes  Assessment and plan of treatment:	You have advanced dementia with bed bound status with multiple comorbidities and overall long term prognosis and quality of life is poor. We discussed with your Health Care Proxy and family goals of care. You was also seen by Palliative care team who also discussed Goals of care. Your family at this time would like to attempt all interventions and resuscitative measures.  Secondary Diagnosis:	Severe protein-calorie malnutrition  Goal:	Improve nutritional status  Assessment and plan of treatment:	Due to decreased oral intake;  You also had Aspiration risk.   Your family agreed for a feeding tube which was placed and started on feeding which you tolerated well.  Dietary consulted. You are recommended to receive total 4 cans of Jevity 1.5 daily as tolerated.  Please flush with water before and after feeds.  Please note that a feeding tube may minimize risk of Aspiration but not eliminated as you could still have microaspiration from upper airway secretions hence follow precautions as advised  Secondary Diagnosis:	Hypophosphatemia  Goal:	avoid depletion  Assessment and plan of treatment:	You were found to have low phosphorous. You were replaced before discharge. Please f/u with PCP in 5 days to re check phosphorous levels. Continue with current tube feeds to maintain good nutrition

## 2018-10-12 NOTE — CONSULT NOTE ADULT - PROBLEM SELECTOR RECOMMENDATION 6
Spoke with patient's wife and daughter Laura who expressed she is has POA and is legal guardian for the patient. She will bring paperwork in. Discussed current clinical condition, trajectory of illness, overall goals of care. Advised that the patient did not pass swallow evaluation and is currently npo. Discussed possible need for feeding tubes including NGT, PEG. They wish to hold off NGT for now and continue IV hydration. They will discuss regarding PEG if swallowing does not improve. Advised that given the patient's comorbidities, frailty, advanced dementia, CPR and/or intubation would pose an undue burden. They currently wish to continue all medical measures to keep the patient alive. He remains FULL CODE for now. All questions answered, support provided. Palliative will follow.

## 2018-10-12 NOTE — DIETITIAN INITIAL EVALUATION ADULT. - OTHER INFO
Nutrition consult requested for pressure ulcer >2.  Patient from home lives with family. Visited pt. debilitated, nephew at beside, reports eats a lot?? but recently eating 1-2 tablespoons of meals & takes care of pt. stated gradually losing wt. >5 yrs but unsure of UBW?? with current wt. 100 Lbs, conducted nutrition focus physical exam & found signs of malnutrition, see above fields. Pt. seen by Speech & Swallow on 10/11 reports pt. with risks for aspiration therefore pt. kept NPO, per chart/MD family in discussion for possible PEG placement, right lower extremities ulcerations & podiatry following noted. Discussed with MD/RN.

## 2018-10-12 NOTE — CHART NOTE - NSCHARTNOTEFT_GEN_A_CORE
Upon Nutritional Assessment by the Registered Dietitian your patient was determined to meet criteria / has evidence of the following diagnosis/diagnoses:          [ ]  Mild Protein Calorie Malnutrition        [ ]  Moderate Protein Calorie Malnutrition        [X ] Severe Protein Calorie Malnutrition        [ ] Unspecified Protein Calorie Malnutrition        [ ] Underweight / BMI <19        [ ] Morbid Obesity / BMI > 40      Findings as based on:  •  Comprehensive nutrition assessment and consultation  •  Calorie counts (nutrient intake analysis)  •  Food acceptance and intake status from observations by staff  •  Follow up  •  Patient education  •  Intervention secondary to interdisciplinary rounds  •   concerns      Treatment:    The following diet has been recommended:      PROVIDER Section:     By signing this assessment you are acknowledging and agree with the diagnosis/diagnoses assigned by the Registered Dietitian    Comments:  Advance diet with nutrition supplement as medically feasible or consider alternate nutrition support as medically feasible.

## 2018-10-12 NOTE — DISCHARGE NOTE ADULT - PATIENT PORTAL LINK FT
You can access the [facility], offered by [facility], by registering with the following website: [email or web address]

## 2018-10-12 NOTE — CONSULT NOTE ADULT - SUBJECTIVE AND OBJECTIVE BOX
HPI:  86/ M Pt with PMH of BPH, HTN, and Advanced Dementia presents to ED c/o right foot ulceration x 2 weeks. Pt visited podiatrist yesterday for a worsening right foot ulceration, located by his big toe form where he was sent to hospital for further work up, Patient was seen in ED by podiatry. (10 Oct 2018 19:50)    Interval history: 86 y M hx CVA, aphasic, dementia, bedbound, admitted for R foot ulcer, also found to have R PNA. Failed SLP evaluation, recommended for npo for now.       PAST MEDICAL & SURGICAL HISTORY:  HTN (hypertension)  Advanced dementia  BPH (benign prostatic hyperplasia)  No significant past surgical history      SOCIAL HISTORY: lives w 3 daughters, wife, niece, nephew   Admitted from:  home        Surrogate/HCP/Guardian:   Erika         Phone#: [phone]    FAMILY HISTORY:  No pertinent family history in first degree relatives    Baseline ADLs (prior to admission): nonverba, bedbound, dependent on all ADLs    Allergies    No Known Allergies    Intolerances      Present Symptoms:         Review of Systems:  Unable to obtain due to poor mentation     MEDICATIONS  (STANDING):  acetylcysteine 10% Inhalation 2.5 milliLiter(s) Inhalation every 6 hours  ALBUTerol/ipratropium for Nebulization 3 milliLiter(s) Nebulizer every 6 hours  aspirin enteric coated 81 milliGRAM(s) Oral daily  dextrose 5% + sodium chloride 0.9% with potassium chloride 20 mEq/L 1000 milliLiter(s) (75 mL/Hr) IV Continuous <Continuous>  enoxaparin Injectable 30 milliGRAM(s) SubCutaneous daily  finasteride 5 milliGRAM(s) Oral daily  influenza   Vaccine 0.5 milliLiter(s) IntraMuscular once  piperacillin/tazobactam IVPB. 3.375 Gram(s) IV Intermittent every 8 hours  potassium chloride  10 mEq/100 mL IVPB 10 milliEquivalent(s) IV Intermittent every 1 hour  tamsulosin 0.4 milliGRAM(s) Oral at bedtime  vancomycin  IVPB 1000 milliGRAM(s) IV Intermittent every 12 hours    MEDICATIONS  (PRN):      PHYSICAL EXAM:    Vital Signs Last 24 Hrs  T(C): 36.5 (12 Oct 2018 14:53), Max: 36.9 (12 Oct 2018 05:23)  T(F): 97.7 (12 Oct 2018 14:53), Max: 98.4 (12 Oct 2018 05:23)  HR: 80 (12 Oct 2018 14:53) (80 - 89)  BP: 118/71 (12 Oct 2018 14:53) (101/52 - 118/71)  BP(mean): --  RR: 20 (12 Oct 2018 14:53) (18 - 20)  SpO2: 95% (12 Oct 2018 14:53) (95% - 97%)     Karnofsky Performance Score/Palliative Performance Status Version2:  30 %     GENERAL: lethargic, nonverbal, cachectic, NAD  HEENT: Atraumatic, sclera anicteric, neck supple  CHEST/LUNG: unlabored  HEART: Regular rate and rhythm    ABDOMEN: Soft, Nontender, Nondistended   MUSCULOSKELETAL:  No  edema, bedbound  NERVOUS SYSTEM:  lethargic, nonverbal, not following commands  SKIN: R foot w dressing in place  Oral intake: npo       LABS:                        8.2    16.3  )-----------( 462      ( 12 Oct 2018 06:32 )             26.0     10-12    138  |  107  |  12  ----------------------------<  125<H>  3.1<L>   |  22  |  1.01    Ca    8.0<L>      12 Oct 2018 06:32  Phos  2.6     10-12  Mg     1.9     10-12          RADIOLOGY & ADDITIONAL STUDIES:    ADVANCE DIRECTIVES:

## 2018-10-12 NOTE — DISCHARGE NOTE ADULT - NSFTFSERV1RD_GEN_ALL_CORE
teaching and training/observation and assessment/rehabilitation services wound care and assessment/teaching and training/observation and assessment

## 2018-10-12 NOTE — PROGRESS NOTE ADULT - ASSESSMENT
86/ M Pt with PMH of BPH, HTN, and Advanced Dementia presents to ED c/o right foot ulceration x 2 weeks. Pt visited podiatrist yesterday for a worsening right foot ulceration, located by his big toe form where he was sent to hospital for further work up, Patient was seen in ED by podiatry.     In ED: stat dose of vanco and cefepime was given   Vitals: 136/90, 110, 98.3, 18(96)  podiatry was consulted and recommended MRI , wound culture was taken   Xray foot: Lucency at the head of the first metatarsal which may represent osteomyelitis  Xray chest: Question additional density superimposing with anterior right   first rib. Extra imaging and/or CT might be advisable.  CT chest: RUL consolidation.  MRI of the foot pending. 86/ M Pt with PMH of BPH, HTN, and Advanced Dementia presents to ED c/o right foot ulceration x 2 weeks. Pt visited podiatrist yesterday for a worsening right foot ulceration, located by his big toe form where he was sent to hospital for further work up, Patient was seen in ED by podiatry.     In ED: stat dose of vanco and cefepime was given   Vitals: 136/90, 110, 98.3, 18(96)  podiatry was consulted and recommended MRI , wound culture was taken   Xray foot: Lucency at the head of the first metatarsal which may represent osteomyelitis  Xray chest: Question additional density superimposing with anterior right   first rib. Extra imaging and/or CT might be advisable.  CT chest: RUL and RLL consolidation.  MRI of the foot pending.

## 2018-10-12 NOTE — PROGRESS NOTE ADULT - PROBLEM SELECTOR PLAN 5
Not on any medications   bedbound - non verbal, non mobile for about 14 years  follow up with palliative care Not on any medications   bedbound - non verbal, non mobile for about 14 years  follow up with palliative care, Dr. Bello

## 2018-10-12 NOTE — PROGRESS NOTE ADULT - PROBLEM SELECTOR PLAN 3
-Will continue lisinopril with parameters once patient can tolerate PO  -Monitor BP -Hold lisinopril for borderline BP  -Monitor BP

## 2018-10-12 NOTE — PROGRESS NOTE ADULT - ASSESSMENT
1.	Right foot acute osteomyelitis   2.	Aspiration pneumonia   3.	Leukocytosis - increased  ·	cont zosyn 3.375gm IV to q8h  D3  ·	increased vanco to 1gm IV q12h  D3  ·	awaiting toe culture sensitivity  ·	about to go for MRI of right foot

## 2018-10-12 NOTE — PROGRESS NOTE ADULT - SUBJECTIVE AND OBJECTIVE BOX
87 y/o male is under our care for pneumonia and right foot ulcer. About to go for MRI of right foot now. Had no overnight events.  Wbc count has increased, will monitor for now. BC/ UC are negative. Right toe culture is growing num staph aureus. Had swallow evaluation yesterday and silent aspiration is suspected, remains NPO.      REVIEW OF SYSTEMS:  [ X ] Not able to illicit     ALLERGIES: No Known Allergies    MEDS:  piperacillin/tazobactam IVPB. 3.375 Gram(s) IV Intermittent every 8 hours  vancomycin  IVPB 750 milliGRAM(s) IV Intermittent every 12 hours    VITALS:  Vital Signs Last 24 Hrs  T(C): 36.9 (12 Oct 2018 05:23), Max: 36.9 (12 Oct 2018 05:23)  T(F): 98.4 (12 Oct 2018 05:23), Max: 98.4 (12 Oct 2018 05:23)  HR: 88 (12 Oct 2018 05:23) (88 - 89)  BP: 105/42 (12 Oct 2018 05:23) (101/52 - 105/42)  BP(mean): --  RR: 18 (12 Oct 2018 05:23) (18 - 18)  SpO2: 95% (12 Oct 2018 05:23) (95% - 97%)    PHYSICAL EXAM:  HEENT: bilateral temporal wasting  Neck: stiff arthritic neck no LN's   Respiratory: bilateral coarse rhonchorous sounds  +audible congestion  Cardiovascular: S1 S2 reg no murmurs  Gastrointestinal: +BS with soft, nondistended abdomen; nontender  Extremities: no edema   Skin: right foot is wrapped  Ortho: mildly contracted BLE  Neuro: demented       LABS/DIAGNOSTIC TESTS:                        8.2    16.3  )-----------( 462      ( 12 Oct 2018 06:32 )             26.0   WBC Count: 16.3 K/uL (10-12 @ 06:32)  WBC Count: 9.8 K/uL (10-11 @ 07:37)  WBC Count: 13.6 K/uL (10-10 @ 14:18)    10-12    138  |  107  |  12  ----------------------------<  125<H>  3.1<L>   |  22  |  1.01    Ca    8.0<L>      12 Oct 2018 06:32  Phos  2.6     10-12  Mg     1.9     10-12    TPro  7.2  /  Alb  2.2<L>  /  TBili  0.3  /  DBili  x   /  AST  32  /  ALT  29  /  AlkPhos  55  10-10    Vancomycin Level, Trough (10.12.18 @ 06:32)    Vancomycin Level, Trough: 9.5        CULTURES:   .Surgical Swab right foot ulcer  10-11 @ 04:04   Numerous Staphylococcus aureus  --  --      .Urine Catheterized  10-10 @ 23:57   No growth  --  --      .Blood Blood-Peripheral  10-10 @ 18:23   No growth to date.  --  --        RADIOLOGY:  10/11 MRI of right foot - ordered

## 2018-10-12 NOTE — DISCHARGE NOTE ADULT - ADDITIONAL INSTRUCTIONS
Follow up with your PCP in one week  Aspiration precautions. Head of bed 45 degree  Turn patient every 2 to 3 hrs to prevent pressure ulcers

## 2018-10-12 NOTE — PROGRESS NOTE ADULT - SUBJECTIVE AND OBJECTIVE BOX
Patient is a 86y old  Male who presents with a chief complaint of left foot ulcer (11 Oct 2018 11:05)      INTERVAL HPI/OVERNIGHT EVENTS: Patient was evaluated by speech and swallow. Due to risk of aspiration, pt is kept in NPO.    T(C): 36.9 (10-12-18 @ 05:23), Max: 36.9 (10-12-18 @ 05:23)  HR: 88 (10-12-18 @ 05:23) (73 - 89)  BP: 105/42 (10-12-18 @ 05:23) (100/52 - 105/42)  RR: 18 (10-12-18 @ 05:23) (18 - 18)  SpO2: 95% (10-12-18 @ 05:23) (95% - 97%)  Wt(kg): --  I&O's Summary      LABS:                        8.2    16.3  )-----------( 462      ( 12 Oct 2018 06:32 )             26.0     10-12    138  |  107  |  12  ----------------------------<  125<H>  3.1<L>   |  22  |  1.01    Ca    8.0<L>      12 Oct 2018 06:32  Phos  2.6     10-12  Mg     1.9     10-12    TPro  7.2  /  Alb  2.2<L>  /  TBili  0.3  /  DBili  x   /  AST  32  /  ALT  29  /  AlkPhos  55  10-10    PT/INR - ( 10 Oct 2018 14:18 )   PT: 15.0 sec;   INR: 1.37 ratio         PTT - ( 10 Oct 2018 14:18 )  PTT:29.6 sec  Urinalysis Basic - ( 10 Oct 2018 14:40 )    Color: Yellow / Appearance: Clear / S.015 / pH: x  Gluc: x / Ketone: Negative  / Bili: Negative / Urobili: 4   Blood: x / Protein: 30 mg/dL / Nitrite: Negative   Leuk Esterase: Trace / RBC: Negative /HPF / WBC 0-2 /HPF   Sq Epi: x / Non Sq Epi: Moderate /HPF / Bacteria: Few /HPF      CAPILLARY BLOOD GLUCOSE        LIVER FUNCTIONS - ( 10 Oct 2018 14:18 )  Alb: 2.2 g/dL / Pro: 7.2 g/dL / ALK PHOS: 55 U/L / ALT: 29 U/L DA / AST: 32 U/L / GGT: x                   MEDICATIONS  (STANDING):  acetylcysteine 10% Inhalation 2.5 milliLiter(s) Inhalation every 6 hours  ALBUTerol/ipratropium for Nebulization 3 milliLiter(s) Nebulizer every 6 hours  aspirin enteric coated 81 milliGRAM(s) Oral daily  dextrose 5% + sodium chloride 0.9%. 1000 milliLiter(s) (75 mL/Hr) IV Continuous <Continuous>  enoxaparin Injectable 30 milliGRAM(s) SubCutaneous daily  finasteride 5 milliGRAM(s) Oral daily  influenza   Vaccine 0.5 milliLiter(s) IntraMuscular once  lisinopril 20 milliGRAM(s) Oral daily  piperacillin/tazobactam IVPB. 3.375 Gram(s) IV Intermittent every 8 hours  potassium chloride  10 mEq/100 mL IVPB 10 milliEquivalent(s) IV Intermittent every 1 hour  tamsulosin 0.4 milliGRAM(s) Oral at bedtime  vancomycin  IVPB 750 milliGRAM(s) IV Intermittent every 12 hours    MEDICATIONS  (PRN):      RADIOLOGY & ADDITIONAL TESTS:    Imaging Personally Reviewed:  [x ] YES  [ ] NO    Consultant(s) Notes Reviewed:  [x ] YES  [ ] NO    REVIEW OF SYSTEMS: Limited as patient is nonverbal and bedbound.  CONSTITUTIONAL: No fever  EYES: No discharge  NECK: No stiffness  RESPIRATORY: + sputum  CARDIOVASCULAR: No palpitations  GASTROINTESTINAL: No nausea, vomiting, or hematemesis; No diarrhea or constipation.  NEUROLOGICAL: No tremors  MUSCULOSKELETAL: right foor ulcer    PHYSICAL EXAM:  GENERAL: NAD, cachectic  HEAD:  Atraumatic, Normocephalic  EYES: PERRLA, conjunctiva and sclera clear  NECK: Supple, No JVD, Normal thyroid  NERVOUS SYSTEM:  demented  CHEST/LUNG: bilateral diffuse rhonchi, no rales, wheezing, or rubs  HEART: Regular rate and rhythm; No murmurs, rubs, or gallops  ABDOMEN: Soft, Nontender, Nondistended; Bowel sounds present  EXTREMITIES:  Right foot ulcers  LYMPH: No lymphadenopathy noted  SKIN: No rashes or lesions other than right foot ulcers    Care Discussed with Consultants/Other Providers [x ] YES  [ ] NO Patient is a 86y old  Male who presents with a chief complaint of Rt foot ulcer (11 Oct 2018 11:05)      INTERVAL HPI/OVERNIGHT EVENTS: Patient was evaluated by speech and swallow. Due to risk of aspiration, pt is kept in NPO.    T(C): 36.9 (10-12-18 @ 05:23), Max: 36.9 (10-12-18 @ 05:23)  HR: 88 (10-12-18 @ 05:23) (73 - 89)  BP: 105/42 (10-12-18 @ 05:23) (100/52 - 105/42)  RR: 18 (10-12-18 @ 05:23) (18 - 18)  SpO2: 95% (10-12-18 @ 05:23) (95% - 97%)  Wt(kg): --  I&O's Summary      LABS:                        8.2    16.3  )-----------( 462      ( 12 Oct 2018 06:32 )             26.0     10-12    138  |  107  |  12  ----------------------------<  125<H>  3.1<L>   |  22  |  1.01    Ca    8.0<L>      12 Oct 2018 06:32  Phos  2.6     10-12  Mg     1.9     10-12    TPro  7.2  /  Alb  2.2<L>  /  TBili  0.3  /  DBili  x   /  AST  32  /  ALT  29  /  AlkPhos  55  10-10    PT/INR - ( 10 Oct 2018 14:18 )   PT: 15.0 sec;   INR: 1.37 ratio         PTT - ( 10 Oct 2018 14:18 )  PTT:29.6 sec  Urinalysis Basic - ( 10 Oct 2018 14:40 )    Color: Yellow / Appearance: Clear / S.015 / pH: x  Gluc: x / Ketone: Negative  / Bili: Negative / Urobili: 4   Blood: x / Protein: 30 mg/dL / Nitrite: Negative   Leuk Esterase: Trace / RBC: Negative /HPF / WBC 0-2 /HPF   Sq Epi: x / Non Sq Epi: Moderate /HPF / Bacteria: Few /HPF      CAPILLARY BLOOD GLUCOSE        LIVER FUNCTIONS - ( 10 Oct 2018 14:18 )  Alb: 2.2 g/dL / Pro: 7.2 g/dL / ALK PHOS: 55 U/L / ALT: 29 U/L DA / AST: 32 U/L / GGT: x                   MEDICATIONS  (STANDING):  acetylcysteine 10% Inhalation 2.5 milliLiter(s) Inhalation every 6 hours  ALBUTerol/ipratropium for Nebulization 3 milliLiter(s) Nebulizer every 6 hours  aspirin enteric coated 81 milliGRAM(s) Oral daily  dextrose 5% + sodium chloride 0.9%. 1000 milliLiter(s) (75 mL/Hr) IV Continuous <Continuous>  enoxaparin Injectable 30 milliGRAM(s) SubCutaneous daily  finasteride 5 milliGRAM(s) Oral daily  influenza   Vaccine 0.5 milliLiter(s) IntraMuscular once  lisinopril 20 milliGRAM(s) Oral daily  piperacillin/tazobactam IVPB. 3.375 Gram(s) IV Intermittent every 8 hours  potassium chloride  10 mEq/100 mL IVPB 10 milliEquivalent(s) IV Intermittent every 1 hour  tamsulosin 0.4 milliGRAM(s) Oral at bedtime  vancomycin  IVPB 750 milliGRAM(s) IV Intermittent every 12 hours    MEDICATIONS  (PRN):      RADIOLOGY & ADDITIONAL TESTS:    Imaging Personally Reviewed:  [x ] YES  [ ] NO    Consultant(s) Notes Reviewed:  [x ] YES  [ ] NO    REVIEW OF SYSTEMS: Limited as patient is nonverbal and bedbound.  CONSTITUTIONAL: No fever  EYES: No discharge  NECK: No stiffness  RESPIRATORY: + sputum  CARDIOVASCULAR: No palpitations  GASTROINTESTINAL: No nausea, vomiting, or hematemesis; No diarrhea or constipation.  NEUROLOGICAL: No tremors  MUSCULOSKELETAL: right foor ulcer    PHYSICAL EXAM:  GENERAL: NAD, cachectic  HEAD:  Atraumatic, Normocephalic  EYES: PERRLA, conjunctiva and sclera clear  NECK: Supple, No JVD, Normal thyroid  NERVOUS SYSTEM:  demented  CHEST/LUNG: bilateral diffuse rhonchi, no rales, wheezing, or rubs  HEART: Regular rate and rhythm; No murmurs, rubs, or gallops  ABDOMEN: Soft, Nontender, Nondistended; Bowel sounds present  EXTREMITIES:  Right foot ulcers  LYMPH: No lymphadenopathy noted  SKIN: right foot ulcers    Care Discussed with Consultants/Other Providers [x ] YES  [ ] NO Patient is a 86y old  Male who presents with a chief complaint of Rt foot ulcer (11 Oct 2018 11:05)      INTERVAL HPI/OVERNIGHT EVENTS: Patient was evaluated by speech and swallow. Due to risk of aspiration, pt is kept in NPO.  No specific complaint    T(C): 36.9 (10-12-18 @ 05:23), Max: 36.9 (10-12-18 @ 05:23)  HR: 88 (10-12-18 @ 05:23) (73 - 89)  BP: 105/42 (10-12-18 @ 05:23) (100/52 - 105/42)  RR: 18 (10-12-18 @ 05:23) (18 - 18)  SpO2: 95% (10-12-18 @ 05:23) (95% - 97%)  Wt(kg): --  I&O's Summary      LABS:                        8.2    16.3  )-----------( 462      ( 12 Oct 2018 06:32 )             26.0     10-12    138  |  107  |  12  ----------------------------<  125<H>  3.1<L>   |  22  |  1.01    Ca    8.0<L>      12 Oct 2018 06:32  Phos  2.6     10-12  Mg     1.9     10-12    TPro  7.2  /  Alb  2.2<L>  /  TBili  0.3  /  DBili  x   /  AST  32  /  ALT  29  /  AlkPhos  55  10-10    PT/INR - ( 10 Oct 2018 14:18 )   PT: 15.0 sec;   INR: 1.37 ratio         PTT - ( 10 Oct 2018 14:18 )  PTT:29.6 sec  Urinalysis Basic - ( 10 Oct 2018 14:40 )    Color: Yellow / Appearance: Clear / S.015 / pH: x  Gluc: x / Ketone: Negative  / Bili: Negative / Urobili: 4   Blood: x / Protein: 30 mg/dL / Nitrite: Negative   Leuk Esterase: Trace / RBC: Negative /HPF / WBC 0-2 /HPF   Sq Epi: x / Non Sq Epi: Moderate /HPF / Bacteria: Few /HPF      CAPILLARY BLOOD GLUCOSE        LIVER FUNCTIONS - ( 10 Oct 2018 14:18 )  Alb: 2.2 g/dL / Pro: 7.2 g/dL / ALK PHOS: 55 U/L / ALT: 29 U/L DA / AST: 32 U/L / GGT: x                   MEDICATIONS  (STANDING):  acetylcysteine 10% Inhalation 2.5 milliLiter(s) Inhalation every 6 hours  ALBUTerol/ipratropium for Nebulization 3 milliLiter(s) Nebulizer every 6 hours  aspirin enteric coated 81 milliGRAM(s) Oral daily  dextrose 5% + sodium chloride 0.9%. 1000 milliLiter(s) (75 mL/Hr) IV Continuous <Continuous>  enoxaparin Injectable 30 milliGRAM(s) SubCutaneous daily  finasteride 5 milliGRAM(s) Oral daily  influenza   Vaccine 0.5 milliLiter(s) IntraMuscular once  lisinopril 20 milliGRAM(s) Oral daily  piperacillin/tazobactam IVPB. 3.375 Gram(s) IV Intermittent every 8 hours  potassium chloride  10 mEq/100 mL IVPB 10 milliEquivalent(s) IV Intermittent every 1 hour  tamsulosin 0.4 milliGRAM(s) Oral at bedtime  vancomycin  IVPB 750 milliGRAM(s) IV Intermittent every 12 hours    MEDICATIONS  (PRN):      RADIOLOGY & ADDITIONAL TESTS:    Imaging Personally Reviewed:  [x ] YES  [ ] NO    Consultant(s) Notes Reviewed:  [x ] YES  [ ] NO    REVIEW OF SYSTEMS: Limited as patient is nonverbal and bedbound.  CONSTITUTIONAL: No fever  EYES: No discharge  NECK: No stiffness  RESPIRATORY: + sputum, improved  CARDIOVASCULAR: No palpitations  GASTROINTESTINAL: No nausea, vomiting, or hematemesis; No diarrhea or constipation.  NEUROLOGICAL: No tremors  MUSCULOSKELETAL: right foot ulcer    PHYSICAL EXAM:  GENERAL: NAD, cachectic  HEAD:  Atraumatic, Normocephalic  EYES: PERRLA, conjunctiva and sclera clear  NECK: Supple, No JVD, Normal thyroid  NERVOUS SYSTEM:  demented  CHEST/LUNG: Improved rhonchi B/L, mild RUL rales, no wheezing, or rubs  HEART: Regular rate and rhythm; No murmurs, rubs, or gallops  ABDOMEN: Soft, Nontender, Nondistended; Bowel sounds present  EXTREMITIES:  Right foot ulcers  LYMPH: No lymphadenopathy noted  SKIN: right foot ulcers    Care Discussed with Consultants/Other Providers [x ] YES  [ ] NO

## 2018-10-12 NOTE — DIETITIAN INITIAL EVALUATION ADULT. - MD RECOMMEND
advance diet with nutrition supplement or consider alternate nutrition support as medically feasible

## 2018-10-12 NOTE — DISCHARGE NOTE ADULT - MEDICATION SUMMARY - MEDICATIONS TO TAKE
I will START or STAY ON the medications listed below when I get home from the hospital:    Ancillary supplies  -- 1 unit(s)   -- Indication: For Functional quadriplegia    Jevity 1.5cal  -- 1 each by gastrostomy tube once a day   -- Indication: For Dysphagia, unspecified type    finasteride 5 mg oral tablet  -- 1 tab(s) by mouth once a day  -- Indication: For BPH (benign prostatic hyperplasia)    aspirin 81 mg oral tablet  -- 1 tab(s) by mouth once a day  -- Indication: For Need for prophylactic measure    tamsulosin 0.4 mg oral capsule  -- 1 cap(s) by mouth once a day  -- Indication: For BPH (benign prostatic hyperplasia)    clindamycin 75 mg/5 mL oral liquid  -- 20 milliliter(s) by mouth every 8 hours via PEG tube    Diagnosis: acute osteomyelitis  -- Expires___________________  Finish all this medication unless otherwise directed by prescriber.  Medication should be taken with plenty of water.  Shake well before use.    -- Indication: For Osteomyelitis I will START or STAY ON the medications listed below when I get home from the hospital:    Ancillary supplies  -- 1 unit(s)   -- Indication: For Functional quadriplegia    Jevity 1.5cal  -- 1 each by gastrostomy tube once a day   -- Indication: For Dysphagia, unspecified type    finasteride 5 mg oral tablet  -- 1 tab(s) by mouth once a day  -- Indication: For BPH (benign prostatic hyperplasia)    aspirin 81 mg oral tablet  -- 1 tab(s) by mouth once a day  -- Indication: For Need for prophylactic measure    tamsulosin 0.4 mg oral capsule  -- 1 cap(s) by mouth once a day  -- Indication: For BPH (benign prostatic hyperplasia)    clindamycin 75 mg/5 mL oral liquid  -- 20 milliliter(s) by mouth every 8 hours via PEG tube    Diagnosis: acute osteomyelitis  -- Expires___________________  Finish all this medication unless otherwise directed by prescriber.  Medication should be taken with plenty of water.  Shake well before use.    -- Indication: For Osteomyelitis    zinc sulfate 110 mg oral tablet  -- 2 tab(s) by mouth once a day   -- Take with food or milk.    -- Indication: For Osteomyelitis    ascorbic acid 500 mg oral tablet  -- 1 tab(s) by mouth once a day  -- Indication: For Supplement I will START or STAY ON the medications listed below when I get home from the hospital:    Ancillary supplies  -- 1 unit(s)   -- Indication: For Functional quadriplegia    Jevity 1.5cal  -- 1 each by gastrostomy tube once a day   -- Indication: For Dysphagia, unspecified type    finasteride 5 mg oral tablet  -- 1 tab(s) by mouth once a day  -- Indication: For BPH (benign prostatic hyperplasia)    aspirin 81 mg oral tablet  -- 1 tab(s) by mouth once a day  -- Indication: For Need for prophylactic measure    tamsulosin 0.4 mg oral capsule  -- 1 cap(s) by mouth once a day  -- Indication: For BPH (benign prostatic hyperplasia)    ipratropium-albuterol 0.5 mg-2.5 mg/3 mLinhalation solution  -- 3 milliliter(s) by nebulizer 4 times a day , as needed for shortness of breath  -- For inhalation only.  It is very important that you take or use this exactly as directed.  Do not skip doses or discontinue unless directed by your doctor.  Obtain medical advice before taking any non-prescription drugs as some may affect the action of this medication.    -- Indication: For Shortness of Breath    clindamycin 75 mg/5 mL oral liquid  -- 20 milliliter(s) by mouth every 8 hours via PEG tube    Diagnosis: acute osteomyelitis  -- Expires___________________  Finish all this medication unless otherwise directed by prescriber.  Medication should be taken with plenty of water.  Shake well before use.    -- Indication: For Osteomyelitis    zinc sulfate 110 mg oral tablet  -- 2 tab(s) by mouth once a day   -- Take with food or milk.    -- Indication: For Osteomyelitis    ascorbic acid 500 mg oral tablet  -- 1 tab(s) by mouth once a day  -- Indication: For Supplement

## 2018-10-12 NOTE — DISCHARGE NOTE ADULT - PLAN OF CARE
Treat infection Continue IV antibiotics as prescribed.  Patient has acute osteomyelitis shown in radiology studies.  Patient was seen by podiatrist while inpatient.  F/U with podiatry as an outpatient, and wound care. Patient is at high risk of aspiration pneumonia and did not pass speech and swallow.  Patient will be getting antibiotics for osteomyelitis, and this will cover his pneumonia as well.  Aspiration precautions Aspiration precautions Continue Finasteride and Flomax Continue oral antibiotics as prescribed; Clindamycin 300mg 3 times a day for 33 more days(total of 42days).  Patient has acute osteomyelitis shown in radiology studies.  Patient was seen by podiatrist while inpatient. Not a good candidate for the surgery.  F/U with podiatry as an outpatient, and wound care. Patient is at high risk of aspiration pneumonia and did not pass speech and swallow.  Patient underwent PEG tube insertion.  Aspiration precautions, elevate head to 30-45 degree. Aspiration precautions, no medication indicated. Continue Finasteride and Flomax via PEG tube. Patient is at high risk of aspiration pneumonia and did not pass speech and swallow. He finished 9 days of antibiotics for suspected aspiration pneumonia.  Patient underwent PEG tube insertion.  Aspiration precautions, elevate head to 30-45 degree. Resolution of infection. You was brought in for ulcers in the feet. You was found to have significant upper airway secretions and due to your advanced dementia and inability to clear secretions was found to have Aspiration Pneumonia of Right lower lobe of the lung. You was treated with Broad spectrum Antibiotics Zosyn which was selected to cover your foot ulcers.   Patient is at high risk of aspiration pneumonia and did not pass speech and swallow. He finished 9 days of antibiotics for suspected aspiration pneumonia.  Patient underwent PEG tube insertion to minimize risk of Aspiration.   Aspiration precautions, elevate head to 30-45 degree. Treat underlying infection and prevent worsening. You was found to hav  Continue oral antibiotics as prescribed; Clindamycin 300mg 3 times a day for 33 more days(total of 42days).  Patient has acute osteomyelitis shown in radiology studies.    Patient was seen by podiatrist while inpatient. Not a good candidate for the surgery.  F/U with podiatry as an outpatient, and wound care. Supportive care You have Advanced dementia and has been bed bound for few years.   Aspiration precautions and keep Head of bed elevated at least 45 degrees while feeding and for 30 mins to an hour after to prevent aspiration. Adequate urination and to prevent infections and complications Provide comfort and respect patient and family wishes You have advanced dementia with bed bound status with multiple comorbidities and overall long term prognosis and quality of life is poor. We discussed with your Health Care Proxy and family goals of care. You was also seen by Palliative care team who also discussed Goals of care. Your family at this time would like to attempt all interventions and resuscitative measures. Improve nutritional status Due to decreased oral intake;  You also had Aspiration risk.   Your family agreed for a feeding tube which was placed and started on feeding which you tolerated well.  Dietary consulted. You are recommended to receive total 4 cans of Jevity 1.5 daily as tolerated.  Please flush with water before and after feeds.  Please note that a feeding tube may minimize risk of Aspiration but not eliminated as you could still have microaspiration from upper airway secretions hence follow precautions as advised You had an MRI which showed findings consistent with osteomyelitis in the distal first metatarsal   neck region, throughout the first metatarsal head and throughout the medial hallux sesamoid. Osteomyelitis is also suspected at the base of the first proximal phalanx as well as within the lateral hallux sesamoid. There is surrounding cellulitis. There is no drainable fluid collection or abscess.  You was treated with vancomycin and Zosyn for 9 days; You was seen by Infectious Diseases Dr. Angela Zimmer  who recommended completing 6 weks of treatment. Continue oral antibiotics as prescribed; Clindamycin 300mg 3 times a day for 33 more days(total of 42days). We requested for solution as easy to administer via PEG. Your pharmacy will be dispensing 2 week at a time as reconstituted solution is good for 2 weeks. Patient was seen by podiatrist Dr. Almeida while inpatient. Not a good candidate for the surgery given his bed bound status and comorbidities.  F/U with podiatry as an outpatient, and wound care at home via [facility] avoid depletion You were found to have low phosphorous. You were replaced before discharge. Please f/u with PCP in 5 days to re check phosphorous levels. Continue with current tube feeds to maintain good nutrition

## 2018-10-12 NOTE — DISCHARGE NOTE ADULT - SECONDARY DIAGNOSIS.
Other pneumonia, unspecified organism Advanced dementia BPH (benign prostatic hyperplasia) Osteomyelitis Goals of care, counseling/discussion Severe protein-calorie malnutrition Hypophosphatemia

## 2018-10-12 NOTE — CONSULT NOTE ADULT - PROBLEM SELECTOR RECOMMENDATION 5
nephew reports pt was minimally ambulating past several yrs, however over the past year has become completely bedbound. Frequent positioning

## 2018-10-12 NOTE — PROGRESS NOTE ADULT - SUBJECTIVE AND OBJECTIVE BOX
Patient is a 86y old  Male who presents with a chief complaint of right foot ulcers    HPI: Per Family 86 year old M Pt w/ PMHx of BPH, HTN, and Advanced Dementia presents to ED c/o right foot ulceration x 2 weeks. Pt visited podiatrist yesterday for a worsening right foot ulceration, located by his big toe. Pt was referred to ED for evaluation of possible osteomyelitis yesterday, but was unable to come to ED yesterday as it was not possible to arrange an ambulance.  NKDA  Pt seen bedside by podiatry. Pt unable to communicate, Pt's family present bedside and states he can not communicate. Pt's family denies F/N/V/SOB. states pt sent by podiatrist Dr. Valentin to evaluate if patient has osteomyelitis. Pt family denies any overnight issues.       PMH:HTN (hypertension)  Advanced dementia  BPH (benign prostatic hyperplasia)    Allergies: No Known Allergies    Medications:   FH:  PSX: No significant past surgical history    SH:     ICU Vital Signs Last 24 Hrs  T(C): 36.9 (12 Oct 2018 05:23), Max: 36.9 (12 Oct 2018 05:23)  T(F): 98.4 (12 Oct 2018 05:23), Max: 98.4 (12 Oct 2018 05:23)  HR: 88 (12 Oct 2018 05:23) (73 - 89)  BP: 105/42 (12 Oct 2018 05:23) (100/52 - 105/42)  BP(mean): --  ABP: --  ABP(mean): --  RR: 18 (12 Oct 2018 05:23) (18 - 18)  SpO2: 95% (12 Oct 2018 05:23) (95% - 97%)        LABS                        8.2    16.3  )-----------( 462      ( 12 Oct 2018 06:32 )             26.0   10-12    138  |  107  |  12  ----------------------------<  125<H>  3.1<L>   |  22  |  1.01    Ca    8.0<L>      12 Oct 2018 06:32  Phos  2.6     10-12  Mg     1.9     10-12    TPro  7.2  /  Alb  2.2<L>  /  TBili  0.3  /  DBili  x   /  AST  32  /  ALT  29  /  AlkPhos  55  10-10          PHYSICAL EXAM  GEN: PERLA HER is a pleasant well-nourished, well developed 86y Male in no acute distress, alert awake, and oriented to person, place and time.   LE Focused: right foot focused   Vasc:  DP/PT palpable, CFT < 3 secs x 5, no edema, TG warm to cool  Derm: ulcers: medial aspect of 1st MPJ, dorsal foot, lateral ankle, no drainage, -PTB to all, no malodor, fibrogranular base, normal borders  Neuro: unable to perform      Imaging: < from: Xray Foot AP + Lateral + Oblique, Right (10.10.18 @ 15:27) >  PROCEDURE DATE:  10/10/2018          INTERPRETATION:  CLINICAL STATEMENT: Pain. Evaluate for osteomyelitis   first toe    TECHNIQUE: AP, lateral and oblique views of right foot    COMPARISON: None.    FINDINGS:  Diffuse soft tissue swelling first digit. Flexion of the phalanges   limited evaluation of the phalanges.. Diffuse osteopenia. Difficult to   evaluate first interphalangeal joint.    Lucency at the head of the first metatarsal which may represent   osteomyelitis in the correct clinical setting. Correlate clinically.    Otherwise, no acute fracture        IMPRESSION:  Findings as described above.     < end of copied text >    Cultures: pending    A: right foot ulcers    P:  pt evaluated and chart reviewed  culture pending  xrays reviewed see above  awaiting MRI results   IV antibiotics as per ID  Podiatry to follow while in house Patient is a 86y old  Male who presents with a chief complaint of right foot ulcers    HPI: Per Family 86 year old M Pt w/ PMHx of BPH, HTN, and Advanced Dementia presents to ED c/o right foot ulceration x 2 weeks. Pt visited podiatrist yesterday for a worsening right foot ulceration, located by his big toe. Pt was referred to ED for evaluation of possible osteomyelitis yesterday, but was unable to come to ED yesterday as it was not possible to arrange an ambulance.  NKDA  Pt seen bedside by podiatry. Pt unable to communicate, Pt's family present bedside and states he can not communicate. Pt's family denies F/N/V/SOB. states pt sent by podiatrist Dr. Valentin to evaluate if patient has osteomyelitis. Pt family denies any overnight issues.       PMH:HTN (hypertension)  Advanced dementia  BPH (benign prostatic hyperplasia)    Allergies: No Known Allergies    Medications:   FH:  PSX: No significant past surgical history    SH:     ICU Vital Signs Last 24 Hrs  T(C): 36.9 (12 Oct 2018 05:23), Max: 36.9 (12 Oct 2018 05:23)  T(F): 98.4 (12 Oct 2018 05:23), Max: 98.4 (12 Oct 2018 05:23)  HR: 88 (12 Oct 2018 05:23) (73 - 89)  BP: 105/42 (12 Oct 2018 05:23) (100/52 - 105/42)  BP(mean): --  ABP: --  ABP(mean): --  RR: 18 (12 Oct 2018 05:23) (18 - 18)  SpO2: 95% (12 Oct 2018 05:23) (95% - 97%)        LABS                        8.2    16.3  )-----------( 462      ( 12 Oct 2018 06:32 )             26.0   10-12    138  |  107  |  12  ----------------------------<  125<H>  3.1<L>   |  22  |  1.01    Ca    8.0<L>      12 Oct 2018 06:32  Phos  2.6     10-12  Mg     1.9     10-12    TPro  7.2  /  Alb  2.2<L>  /  TBili  0.3  /  DBili  x   /  AST  32  /  ALT  29  /  AlkPhos  55  10-10          PHYSICAL EXAM  GEN: PERLA HER is a pleasant well-nourished, well developed 86y Male in no acute distress, alert awake, and oriented to person, place and time.   LE Focused: right foot focused   Vasc:  DP/PT palpable, CFT < 3 secs x 5, no edema, TG warm to cool  Derm: ulcers: medial aspect of 1st MPJ, dorsal foot, lateral ankle, no drainage, -PTB to all, no malodor, fibrogranular base, normal borders  Neuro: unable to perform      Imaging: < from: Xray Foot AP + Lateral + Oblique, Right (10.10.18 @ 15:27) >  PROCEDURE DATE:  10/10/2018          INTERPRETATION:  CLINICAL STATEMENT: Pain. Evaluate for osteomyelitis   first toe    TECHNIQUE: AP, lateral and oblique views of right foot    COMPARISON: None.    FINDINGS:  Diffuse soft tissue swelling first digit. Flexion of the phalanges   limited evaluation of the phalanges.. Diffuse osteopenia. Difficult to   evaluate first interphalangeal joint.    Lucency at the head of the first metatarsal which may represent   osteomyelitis in the correct clinical setting. Correlate clinically.    Otherwise, no acute fracture        IMPRESSION:  Findings as described above.     < end of copied text >    ------------------------------------------------------    < from: MR Foot No Cont, Right (10.12.18 @ 15:02) >  PROCEDURE DATE:  10/12/2018          INTERPRETATION:  MRI OF THE RIGHT FOOT:    CLINICAL INDICATION: Right osteomyelitis, right foot.    TECHNIQUE: Multiplanar, muliti-sequential MRI of the right foot was   performed without contrast. The study is degraded by motion artifact.    FINDINGS:    Motion degraded images demonstrate diffuse bone marrow edema pattern and   associated low T1 signal alteration in the first metatarsal head and   first metatarsal neck region consistent with osteomyelitis. Findings   compatible with osteomyelitis is also suspected in the medial hallux   sesamoid which is diffusely low signal on the T1-weighted sequences and   markedly hyperintense on fluid sensitive sequences.  Mild bone marrow   edema pattern is also noted at the base of the first proximal phalanx   with associated subtle low T1 signal alteration.  Diffuse bone marrow   edema pattern and subtle low T1 signal alteration also noted in the   lateral hallux sesamoid, also suggestive of osteomyelitis. There is   surrounding soft tissue swelling compatible with cellulitis. There is no   drainable fluid collection or abscess. There is suggestion of skin   ulceration along the medial aspect of the first metatarsal head. The   remaining osseous structures demonstrate normal signal.     IMPRESSION:    Motion degraded study.    Findings consistent with osteomyelitis in the distal first metatarsal   neck region, throughout the first metatarsal head and throughout the   medial hallux sesamoid. Osteomyelitis is also suspected at the base of   the first proximal phalanx as well as within the lateral hallux sesamoid.   There is surrounding cellulitis. There is no drainable fluid collection   or abscess.    < end of copied text >  -------------------------------------------------------------    Cultures: pending    A: right foot ulcers    P:  pt evaluated and chart reviewed  culture pending  xrays reviewed see above  MRI results see above  Discussed with Dr. Almeida,   pending Medical clearance, Plan to take to OR next week for partial amputation of 1st ray of Right foot  IV antibiotics as per ID  Podiatry to follow while in house Patient is a 86y old  Male who presents with a chief complaint of right foot ulcers    HPI: Per Family 86 year old M Pt w/ PMHx of BPH, HTN, and Advanced Dementia presents to ED c/o right foot ulceration x 2 weeks. Pt visited podiatrist yesterday for a worsening right foot ulceration, located by his big toe. Pt was referred to ED for evaluation of possible osteomyelitis yesterday, but was unable to come to ED yesterday as it was not possible to arrange an ambulance.  NKDA  Pt seen bedside by podiatry. Pt unable to communicate, Pt's family present bedside and states he can not communicate. Pt's family denies F/N/V/SOB. states pt sent by podiatrist Dr. Valentin to evaluate if patient has osteomyelitis. Pt family denies any overnight issues.       PMH:HTN (hypertension)  Advanced dementia  BPH (benign prostatic hyperplasia)    Allergies: No Known Allergies    Medications:   FH:  PSX: No significant past surgical history    SH:     ICU Vital Signs Last 24 Hrs  T(C): 36.9 (12 Oct 2018 05:23), Max: 36.9 (12 Oct 2018 05:23)  T(F): 98.4 (12 Oct 2018 05:23), Max: 98.4 (12 Oct 2018 05:23)  HR: 88 (12 Oct 2018 05:23) (73 - 89)  BP: 105/42 (12 Oct 2018 05:23) (100/52 - 105/42)  BP(mean): --  ABP: --  ABP(mean): --  RR: 18 (12 Oct 2018 05:23) (18 - 18)  SpO2: 95% (12 Oct 2018 05:23) (95% - 97%)        LABS                        8.2    16.3  )-----------( 462      ( 12 Oct 2018 06:32 )             26.0   10-12    138  |  107  |  12  ----------------------------<  125<H>  3.1<L>   |  22  |  1.01    Ca    8.0<L>      12 Oct 2018 06:32  Phos  2.6     10-12  Mg     1.9     10-12    TPro  7.2  /  Alb  2.2<L>  /  TBili  0.3  /  DBili  x   /  AST  32  /  ALT  29  /  AlkPhos  55  10-10          PHYSICAL EXAM  GEN: PERLA HER is a pleasant well-nourished, well developed 86y Male in no acute distress, alert awake, and oriented to person, place and time.   LE Focused: right foot focused   Vasc:  DP/PT palpable, CFT < 3 secs x 5, no edema, TG warm to cool  Derm: ulcers: medial aspect of 1st MPJ, dorsal foot, lateral ankle, no drainage, -PTB to all, no malodor, fibrogranular base, normal borders  Neuro: unable to perform      Imaging: < from: Xray Foot AP + Lateral + Oblique, Right (10.10.18 @ 15:27) >  PROCEDURE DATE:  10/10/2018          INTERPRETATION:  CLINICAL STATEMENT: Pain. Evaluate for osteomyelitis   first toe    TECHNIQUE: AP, lateral and oblique views of right foot    COMPARISON: None.    FINDINGS:  Diffuse soft tissue swelling first digit. Flexion of the phalanges   limited evaluation of the phalanges.. Diffuse osteopenia. Difficult to   evaluate first interphalangeal joint.    Lucency at the head of the first metatarsal which may represent   osteomyelitis in the correct clinical setting. Correlate clinically.    Otherwise, no acute fracture        IMPRESSION:  Findings as described above.     < end of copied text >    ------------------------------------------------------    < from: MR Foot No Cont, Right (10.12.18 @ 15:02) >  PROCEDURE DATE:  10/12/2018          INTERPRETATION:  MRI OF THE RIGHT FOOT:    CLINICAL INDICATION: Right osteomyelitis, right foot.    TECHNIQUE: Multiplanar, muliti-sequential MRI of the right foot was   performed without contrast. The study is degraded by motion artifact.    FINDINGS:    Motion degraded images demonstrate diffuse bone marrow edema pattern and   associated low T1 signal alteration in the first metatarsal head and   first metatarsal neck region consistent with osteomyelitis. Findings   compatible with osteomyelitis is also suspected in the medial hallux   sesamoid which is diffusely low signal on the T1-weighted sequences and   markedly hyperintense on fluid sensitive sequences.  Mild bone marrow   edema pattern is also noted at the base of the first proximal phalanx   with associated subtle low T1 signal alteration.  Diffuse bone marrow   edema pattern and subtle low T1 signal alteration also noted in the   lateral hallux sesamoid, also suggestive of osteomyelitis. There is   surrounding soft tissue swelling compatible with cellulitis. There is no   drainable fluid collection or abscess. There is suggestion of skin   ulceration along the medial aspect of the first metatarsal head. The   remaining osseous structures demonstrate normal signal.     IMPRESSION:    Motion degraded study.    Findings consistent with osteomyelitis in the distal first metatarsal   neck region, throughout the first metatarsal head and throughout the   medial hallux sesamoid. Osteomyelitis is also suspected at the base of   the first proximal phalanx as well as within the lateral hallux sesamoid.   There is surrounding cellulitis. There is no drainable fluid collection   or abscess.    < end of copied text >  -------------------------------------------------------------    Cultures: pending    A: right foot ulcers    P:  pt evaluated and chart reviewed  culture pending  xrays reviewed see above  MRI results see above  Discussed with Dr. Almeida,   discussed with family results of MRI and treatment options including 1st ray partial amputation, Family would like more time to discuss with the rest of the family who will be bedside on sunday before making any decision.  pending Medical clearance, Plan to take to OR next week for partial amputation of 1st ray of Right foot if family agrees.   IV antibiotics as per ID  Podiatry to follow while in house

## 2018-10-12 NOTE — DIETITIAN INITIAL EVALUATION ADULT. - SIGNS/SYMPTOMS
Poor oral intake & worsening, NPO x3days, 67% of IBW/wt.loss, Poor oral intake & worsening, NPO x3days, 67% of IBW, wt .loss, BMI15, emaciated, pressure ulcer

## 2018-10-12 NOTE — PROGRESS NOTE ADULT - PROBLEM SELECTOR PLAN 7
HCP: son and daughter Ms. Donald [phone]  full code  May need discussion for PEG tube. HCP: son and daughter Ms. Donald [phone]  Nephew at bedside daily, he is not HCP  full code  May need discussion for PEG tube.

## 2018-10-12 NOTE — PROGRESS NOTE ADULT - ATTENDING COMMENTS
Patient seen/evaluated at bedside 10/12/18. I agree with the resident progress note/outlined plan of care. My independent findings and conclusions are documented.    Pt seen lying quietly in bed. No reported events events overnight.  Assessed by speech and swallow and is strict npo. Family declins NGT at this time though dicussing PEG placement    ROS not meaningfully obtained    PE vitals reviewed  cachectic, eyes open, non responsive/not interactive-->   lying flat in bed  PERRLA  coarse upper airway sounds  S1S2 RRR  soft, NT, ND, + BS  right foot in dressing  D/D:  1.  sepsis on admission (elevated lactate, leukocytosis,  tachycardia) s/t  2. cellulitis infected ulcer of right foot, and likely osteomyelitis of right great toe  3. suspected aspiration pneumonia, RLL consolidation  4. enterovirus infection/bronchitis  5. severe protein calorie malnutrition  6. advanced alzheimer's dementia bedbound status s/p CVA  7. dysphagia  8. debility  9. functional quadriplegia    Plan:  -continue with zosyn and vancomycin; Vanco trough prior to 4th dose; Adjust Vanco as per dose  -ID consult appreciated   -MRI of right foot if able to tolerate if not may get CT, check with ID  -q 2 hour turns, HOB 45 degree,   -bronchodilators  -patient is strict NPO, gentle dextrose based fluid hydration  -add dietary supplements, nutrition consult  -DVT prophylaxis  -Prognosis guarded  -Advance directives d/w family at bedside (Daughter in law and Nephew); At this point wants resuscitation and Intubation if clinically indicated  As per family, they have been taking care of patient for years. No HHA  -palliative care consult

## 2018-10-12 NOTE — DISCHARGE NOTE ADULT - INSTRUCTIONS
Feeding via PEG tube with flushes before and after feeds as taught.  Free water via PEG  three times a day between the feeds.

## 2018-10-12 NOTE — DIETITIAN INITIAL EVALUATION ADULT. - FACTORS AFF FOOD INTAKE
change in mental status/difficulty feeding self/difficulty swallowing/other (specify)/weakness, bedbound, advance dementia, osteomyelitis, pneumonia/difficulty chewing/difficulty with food procurement/preparation/pain/persistent lack of appetite

## 2018-10-13 LAB
-  AMPICILLIN/SULBACTAM: SIGNIFICANT CHANGE UP
-  CEFAZOLIN: SIGNIFICANT CHANGE UP
-  CLINDAMYCIN: SIGNIFICANT CHANGE UP
-  DAPTOMYCIN: SIGNIFICANT CHANGE UP
-  ERYTHROMYCIN: SIGNIFICANT CHANGE UP
-  GENTAMICIN: SIGNIFICANT CHANGE UP
-  LINEZOLID: SIGNIFICANT CHANGE UP
-  OXACILLIN: SIGNIFICANT CHANGE UP
-  PENICILLIN: SIGNIFICANT CHANGE UP
-  RIFAMPIN: SIGNIFICANT CHANGE UP
-  TETRACYCLINE: SIGNIFICANT CHANGE UP
-  TRIMETHOPRIM/SULFAMETHOXAZOLE: SIGNIFICANT CHANGE UP
-  VANCOMYCIN: SIGNIFICANT CHANGE UP
ANION GAP SERPL CALC-SCNC: 7 MMOL/L — SIGNIFICANT CHANGE UP (ref 5–17)
BASOPHILS # BLD AUTO: 0 K/UL — SIGNIFICANT CHANGE UP (ref 0–0.2)
BASOPHILS NFR BLD AUTO: 0.4 % — SIGNIFICANT CHANGE UP (ref 0–2)
BUN SERPL-MCNC: 7 MG/DL — SIGNIFICANT CHANGE UP (ref 7–18)
CALCIUM SERPL-MCNC: 7.8 MG/DL — LOW (ref 8.4–10.5)
CHLORIDE SERPL-SCNC: 105 MMOL/L — SIGNIFICANT CHANGE UP (ref 96–108)
CO2 SERPL-SCNC: 22 MMOL/L — SIGNIFICANT CHANGE UP (ref 22–31)
CREAT SERPL-MCNC: 0.84 MG/DL — SIGNIFICANT CHANGE UP (ref 0.5–1.3)
EOSINOPHIL # BLD AUTO: 0 K/UL — SIGNIFICANT CHANGE UP (ref 0–0.5)
EOSINOPHIL NFR BLD AUTO: 0.1 % — SIGNIFICANT CHANGE UP (ref 0–6)
GLUCOSE BLDC GLUCOMTR-MCNC: 100 MG/DL — HIGH (ref 70–99)
GLUCOSE BLDC GLUCOMTR-MCNC: 135 MG/DL — HIGH (ref 70–99)
GLUCOSE SERPL-MCNC: 101 MG/DL — HIGH (ref 70–99)
HCT VFR BLD CALC: 28.7 % — LOW (ref 39–50)
HGB BLD-MCNC: 8.9 G/DL — LOW (ref 13–17)
LYMPHOCYTES # BLD AUTO: 18.8 % — SIGNIFICANT CHANGE UP (ref 13–44)
LYMPHOCYTES # BLD AUTO: 2.1 K/UL — SIGNIFICANT CHANGE UP (ref 1–3.3)
MAGNESIUM SERPL-MCNC: 1.8 MG/DL — SIGNIFICANT CHANGE UP (ref 1.6–2.6)
MCHC RBC-ENTMCNC: 27.1 PG — SIGNIFICANT CHANGE UP (ref 27–34)
MCHC RBC-ENTMCNC: 31.1 GM/DL — LOW (ref 32–36)
MCV RBC AUTO: 87.4 FL — SIGNIFICANT CHANGE UP (ref 80–100)
METHOD TYPE: SIGNIFICANT CHANGE UP
MONOCYTES # BLD AUTO: 0.9 K/UL — SIGNIFICANT CHANGE UP (ref 0–0.9)
MONOCYTES NFR BLD AUTO: 8.1 % — SIGNIFICANT CHANGE UP (ref 2–14)
NEUTROPHILS # BLD AUTO: 8.3 K/UL — HIGH (ref 1.8–7.4)
NEUTROPHILS NFR BLD AUTO: 72.7 % — SIGNIFICANT CHANGE UP (ref 43–77)
PHOSPHATE SERPL-MCNC: 2.5 MG/DL — SIGNIFICANT CHANGE UP (ref 2.5–4.5)
PLATELET # BLD AUTO: 454 K/UL — HIGH (ref 150–400)
POTASSIUM SERPL-MCNC: 4 MMOL/L — SIGNIFICANT CHANGE UP (ref 3.5–5.3)
POTASSIUM SERPL-SCNC: 4 MMOL/L — SIGNIFICANT CHANGE UP (ref 3.5–5.3)
RBC # BLD: 3.29 M/UL — LOW (ref 4.2–5.8)
RBC # FLD: 14 % — SIGNIFICANT CHANGE UP (ref 10.3–14.5)
SODIUM SERPL-SCNC: 134 MMOL/L — LOW (ref 135–145)
WBC # BLD: 11.4 K/UL — HIGH (ref 3.8–10.5)
WBC # FLD AUTO: 11.4 K/UL — HIGH (ref 3.8–10.5)

## 2018-10-13 PROCEDURE — 99233 SBSQ HOSP IP/OBS HIGH 50: CPT | Mod: GC

## 2018-10-13 RX ADMIN — ENOXAPARIN SODIUM 30 MILLIGRAM(S): 100 INJECTION SUBCUTANEOUS at 14:10

## 2018-10-13 RX ADMIN — Medication 3 MILLILITER(S): at 20:16

## 2018-10-13 RX ADMIN — Medication 3 MILLILITER(S): at 15:19

## 2018-10-13 RX ADMIN — Medication 3 MILLILITER(S): at 08:13

## 2018-10-13 RX ADMIN — Medication 250 MILLIGRAM(S): at 05:42

## 2018-10-13 RX ADMIN — PIPERACILLIN AND TAZOBACTAM 25 GRAM(S): 4; .5 INJECTION, POWDER, LYOPHILIZED, FOR SOLUTION INTRAVENOUS at 19:08

## 2018-10-13 RX ADMIN — PIPERACILLIN AND TAZOBACTAM 25 GRAM(S): 4; .5 INJECTION, POWDER, LYOPHILIZED, FOR SOLUTION INTRAVENOUS at 09:17

## 2018-10-13 RX ADMIN — Medication 2.5 MILLILITER(S): at 15:15

## 2018-10-13 RX ADMIN — Medication 250 MILLIGRAM(S): at 17:36

## 2018-10-13 RX ADMIN — PIPERACILLIN AND TAZOBACTAM 25 GRAM(S): 4; .5 INJECTION, POWDER, LYOPHILIZED, FOR SOLUTION INTRAVENOUS at 01:06

## 2018-10-13 RX ADMIN — Medication 3 MILLILITER(S): at 02:36

## 2018-10-13 RX ADMIN — Medication 2.5 MILLILITER(S): at 08:15

## 2018-10-13 RX ADMIN — Medication 2.5 MILLILITER(S): at 02:36

## 2018-10-13 RX ADMIN — Medication 2.5 MILLILITER(S): at 20:16

## 2018-10-13 RX ADMIN — DEXTROSE MONOHYDRATE, SODIUM CHLORIDE, AND POTASSIUM CHLORIDE 75 MILLILITER(S): 50; .745; 4.5 INJECTION, SOLUTION INTRAVENOUS at 09:17

## 2018-10-13 NOTE — PROGRESS NOTE ADULT - SUBJECTIVE AND OBJECTIVE BOX
Patient is a 86y old  Male who presents with a chief complaint of left foot ulcer (13 Oct 2018 10:59)      OVERNIGHT EVENTS:  Patient seen and examined at bedside. Patient is non verbal bedbound with nephew at bedside.     T(C): 37.7 (10-13-18 @ 14:58), Max: 37.7 (10-13-18 @ 14:58)  HR: 104 (10-13-18 @ 14:58) (75 - 104)  BP: 121/55 (10-13-18 @ 14:58) (121/55 - 143/56)  RR: 17 (10-13-18 @ 14:58) (17 - 18)  SpO2: 97% (10-13-18 @ 14:58) (97% - 100%)  Wt(kg): --  I&O's Summary        REVIEW OF SYSTEMS:  Unable to assess    PHYSICAL EXAM:    A X O x 0  EYES: EOMI,    NECK: Supple, No JVD, Normal thyroid  Resp: CTAB, No crackles, wheezing,   CVS: Regular rate and rhythm; No murmurs, rubs, or gallops  ABD: Soft, Nontender, Nondistended; Bowel sounds present  EXTREMITIES: R sided foot ulcer covered with sterile dressing    LABS:                        8.9    11.4  )-----------( 454      ( 13 Oct 2018 07:53 )             28.7     10-13    134<L>  |  105  |  7   ----------------------------<  101<H>  4.0   |  22  |  0.84    Ca    7.8<L>      13 Oct 2018 07:53  Phos  2.5     10-13  Mg     1.8     10-13          CAPILLARY BLOOD GLUCOSE      POCT Blood Glucose.: 100 mg/dL (13 Oct 2018 11:04)  POCT Blood Glucose.: 135 mg/dL (13 Oct 2018 06:12)  POCT Blood Glucose.: 151 mg/dL (12 Oct 2018 23:53)            Radiology:

## 2018-10-13 NOTE — PROGRESS NOTE ADULT - SUBJECTIVE AND OBJECTIVE BOX
Patient is a 86y old  Male who presents with a chief complaint of right foot ulcers    HPI: Per Family 86 year old M Pt w/ PMHx of BPH, HTN, and Advanced Dementia presents to ED c/o right foot ulceration x 2 weeks. Pt visited podiatrist yesterday for a worsening right foot ulceration, located by his big toe. Pt was referred to ED for evaluation of possible osteomyelitis yesterday, but was unable to come to ED yesterday as it was not possible to arrange an ambulance.  NKDA  Pt seen bedside by podiatry. Pt unable to communicate, Pt's family present bedside and states he can not communicate. Pt's family denies F/N/V/SOB. states pt sent by podiatrist Dr. Valentin to evaluate if patient has osteomyelitis. Pt family denies any overnight issues.       PMH:HTN (hypertension)  Advanced dementia  BPH (benign prostatic hyperplasia)    Allergies: No Known Allergies    Medications:   FH:  PSX: No significant past surgical history    SH:     ICU Vital Signs Last 24 Hrs  T(C): 36.9 (13 Oct 2018 06:03), Max: 37.1 (12 Oct 2018 20:24)  T(F): 98.5 (13 Oct 2018 06:03), Max: 98.7 (12 Oct 2018 20:24)  HR: 75 (13 Oct 2018 06:03) (75 - 84)  BP: 143/56 (13 Oct 2018 06:03) (118/71 - 143/56)  BP(mean): --  ABP: --  ABP(mean): --  RR: 18 (13 Oct 2018 06:03) (17 - 20)  SpO2: 100% (13 Oct 2018 06:03) (95% - 100%)        LABS                                   8.9    11.4  )-----------( 454      ( 13 Oct 2018 07:53 )             28.7     WBC Count: 11.4 K/uL (10-13 @ 07:53)  WBC Count: 16.3 K/uL (10-12 @ 06:32)  WBC Count: 9.8 K/uL (10-11 @ 07:37)  WBC Count: 13.6 K/uL (10-10 @ 14:18)    10-13    134<L>  |  105  |  7   ----------------------------<  101<H>  4.0   |  22  |  0.84    Ca    7.8<L>      13 Oct 2018 07:53  Phos  2.5     10-13  Mg     1.8     10-13    	    PHYSICAL EXAM  GEN: PERLA HER is a pleasant well-nourished, well developed 86y Male in no acute distress, alert awake, and oriented to person, place and time.   LE Focused: right foot focused   Vasc:  DP/PT palpable, CFT < 3 secs x 5, no edema, TG warm to cool  Derm: ulcers: medial aspect of 1st MPJ, dorsal foot, lateral ankle, no drainage, -PTB to all, no malodor, fibrogranular base, normal borders  Neuro: unable to perform      Imaging: < from: Xray Foot AP + Lateral + Oblique, Right (10.10.18 @ 15:27) >  PROCEDURE DATE:  10/10/2018          INTERPRETATION:  CLINICAL STATEMENT: Pain. Evaluate for osteomyelitis   first toe    TECHNIQUE: AP, lateral and oblique views of right foot    COMPARISON: None.    FINDINGS:  Diffuse soft tissue swelling first digit. Flexion of the phalanges   limited evaluation of the phalanges.. Diffuse osteopenia. Difficult to   evaluate first interphalangeal joint.    Lucency at the head of the first metatarsal which may represent   osteomyelitis in the correct clinical setting. Correlate clinically.    Otherwise, no acute fracture        IMPRESSION:  Findings as described above.     < end of copied text >    ------------------------------------------------------    < from: MR Foot No Cont, Right (10.12.18 @ 15:02) >  PROCEDURE DATE:  10/12/2018          INTERPRETATION:  MRI OF THE RIGHT FOOT:    CLINICAL INDICATION: Right osteomyelitis, right foot.    TECHNIQUE: Multiplanar, muliti-sequential MRI of the right foot was   performed without contrast. The study is degraded by motion artifact.    FINDINGS:    Motion degraded images demonstrate diffuse bone marrow edema pattern and   associated low T1 signal alteration in the first metatarsal head and   first metatarsal neck region consistent with osteomyelitis. Findings   compatible with osteomyelitis is also suspected in the medial hallux   sesamoid which is diffusely low signal on the T1-weighted sequences and   markedly hyperintense on fluid sensitive sequences.  Mild bone marrow   edema pattern is also noted at the base of the first proximal phalanx   with associated subtle low T1 signal alteration.  Diffuse bone marrow   edema pattern and subtle low T1 signal alteration also noted in the   lateral hallux sesamoid, also suggestive of osteomyelitis. There is   surrounding soft tissue swelling compatible with cellulitis. There is no   drainable fluid collection or abscess. There is suggestion of skin   ulceration along the medial aspect of the first metatarsal head. The   remaining osseous structures demonstrate normal signal.     IMPRESSION:    Motion degraded study.    Findings consistent with osteomyelitis in the distal first metatarsal   neck region, throughout the first metatarsal head and throughout the   medial hallux sesamoid. Osteomyelitis is also suspected at the base of   the first proximal phalanx as well as within the lateral hallux sesamoid.   There is surrounding cellulitis. There is no drainable fluid collection   or abscess.    < end of copied text >  -------------------------------------------------------------    Culture - Surgical Swab (10.11.18 @ 04:04)    Specimen Source: .Surgical Swab right foot ulcer    Culture Results:   Numerous Staphylococcus aureus      Cultures: preliminary    A:   right foot ulcers  Osteomyelitis    P:  pt evaluated and chart reviewed  culture - preliminary results above  xrays reviewed see above  MRI results see above  Discussed with Dr. Almeida,   discussed with family results of MRI and treatment options including 1st ray partial amputation, Family would like more time to discuss with the rest of the family who will be bedside on sunday before making any decision.  pending Medical clearance, Plan to take to OR next week for partial amputation of 1st ray of Right foot if family agrees.   IV antibiotics as per ID  Podiatry to follow while in house

## 2018-10-13 NOTE — PROGRESS NOTE ADULT - PROBLEM SELECTOR PLAN 4
not on any medications   bedbound - non verbal, non mobile for about 14 years  Palliative care on board, patient is full code

## 2018-10-13 NOTE — PROGRESS NOTE ADULT - ATTENDING COMMENTS
Patient seen/evaluated at bedside 10/13/2018. I agree with the resident progress note/outlined plan of care. My independent findings and conclusions are documented.    Pt seen lying quietly in bed. No reported events events overnight. Daughter who is the HCP at bedside, updates and plan of care reviewed. She is considering PEG placement. Still has yet to decide  on whether to pursue surgical management of osteomyelitis.       ROS not meaningfully obtained    PE vitals reviewed  cachectic, eyes open, non responsive/not interactive-->   lying flat in bed  PERRLA  coarse upper airway sounds  S1S2 RRR  soft, NT, ND, + BS  right foot in dressing    D/D:  1.  sepsis on admission (elevated lactate, leukocytosis,  tachycardia) s/t  2. cellulitis infected ulcer of right foot, and likely osteomyelitis of right great toe  3. suspected aspiration pneumonia, RLL consolidation  4. enterovirus infection/bronchitis  5. severe protein calorie malnutrition  6. advanced alzheimer's dementia bedbound status s/p CVA  7. dysphagia  8. debility  9. functional quadriplegia    Plan:  -continue with zosyn and vancomycin; Vanco trough prior to 4th dose; Adjust Vanco as per dose  -ID consult appreciated   -MRI of right foot if able to tolerate if not may get CT, check with ID  -q 2 hour turns, HOB 45 degree,   -bronchodilators  -patient is strict NPO, gentle dextrose based fluid hydration  -add dietary supplements, nutrition consult  -DVT prophylaxis  -Prognosis guarded  -discuss w/ GI, Dr. Dorman on Sunday  -palliative care consult .

## 2018-10-13 NOTE — PROGRESS NOTE ADULT - PROBLEM SELECTOR PLAN 6
As per previous documentation. HCP is son and daughter Ms. Donald [phone] and patient is full code

## 2018-10-13 NOTE — PROGRESS NOTE ADULT - PROBLEM SELECTOR PLAN 1
MRI showed Findings consistent with osteomyelitis in the distal first metatarsal   neck region.  Surgical wound growing MRSA   Podiatry on board recommending partial amputation, family have not given consent for surgery yet.   Continue Vanco and Zosyn as per ID. Vancomycin level in AM

## 2018-10-14 LAB
ANION GAP SERPL CALC-SCNC: 6 MMOL/L — SIGNIFICANT CHANGE UP (ref 5–17)
BASOPHILS # BLD AUTO: 0.1 K/UL — SIGNIFICANT CHANGE UP (ref 0–0.2)
BASOPHILS NFR BLD AUTO: 0.8 % — SIGNIFICANT CHANGE UP (ref 0–2)
BUN SERPL-MCNC: 6 MG/DL — LOW (ref 7–18)
CALCIUM SERPL-MCNC: 7.9 MG/DL — LOW (ref 8.4–10.5)
CHLORIDE SERPL-SCNC: 109 MMOL/L — HIGH (ref 96–108)
CO2 SERPL-SCNC: 26 MMOL/L — SIGNIFICANT CHANGE UP (ref 22–31)
CREAT SERPL-MCNC: 0.86 MG/DL — SIGNIFICANT CHANGE UP (ref 0.5–1.3)
EOSINOPHIL # BLD AUTO: 0 K/UL — SIGNIFICANT CHANGE UP (ref 0–0.5)
EOSINOPHIL NFR BLD AUTO: 0.1 % — SIGNIFICANT CHANGE UP (ref 0–6)
GLUCOSE BLDC GLUCOMTR-MCNC: 106 MG/DL — HIGH (ref 70–99)
GLUCOSE BLDC GLUCOMTR-MCNC: 115 MG/DL — HIGH (ref 70–99)
GLUCOSE BLDC GLUCOMTR-MCNC: 120 MG/DL — HIGH (ref 70–99)
GLUCOSE BLDC GLUCOMTR-MCNC: 131 MG/DL — HIGH (ref 70–99)
GLUCOSE BLDC GLUCOMTR-MCNC: 94 MG/DL — SIGNIFICANT CHANGE UP (ref 70–99)
GLUCOSE SERPL-MCNC: 111 MG/DL — HIGH (ref 70–99)
HCT VFR BLD CALC: 27.7 % — LOW (ref 39–50)
HGB BLD-MCNC: 8.8 G/DL — LOW (ref 13–17)
LYMPHOCYTES # BLD AUTO: 2 K/UL — SIGNIFICANT CHANGE UP (ref 1–3.3)
LYMPHOCYTES # BLD AUTO: 20.2 % — SIGNIFICANT CHANGE UP (ref 13–44)
MAGNESIUM SERPL-MCNC: 1.6 MG/DL — SIGNIFICANT CHANGE UP (ref 1.6–2.6)
MCHC RBC-ENTMCNC: 27.2 PG — SIGNIFICANT CHANGE UP (ref 27–34)
MCHC RBC-ENTMCNC: 31.9 GM/DL — LOW (ref 32–36)
MCV RBC AUTO: 85.3 FL — SIGNIFICANT CHANGE UP (ref 80–100)
MONOCYTES # BLD AUTO: 0.8 K/UL — SIGNIFICANT CHANGE UP (ref 0–0.9)
MONOCYTES NFR BLD AUTO: 8.5 % — SIGNIFICANT CHANGE UP (ref 2–14)
NEUTROPHILS # BLD AUTO: 6.9 K/UL — SIGNIFICANT CHANGE UP (ref 1.8–7.4)
NEUTROPHILS NFR BLD AUTO: 70.6 % — SIGNIFICANT CHANGE UP (ref 43–77)
PHOSPHATE SERPL-MCNC: 2.3 MG/DL — LOW (ref 2.5–4.5)
PLATELET # BLD AUTO: 389 K/UL — SIGNIFICANT CHANGE UP (ref 150–400)
POTASSIUM SERPL-MCNC: 3.4 MMOL/L — LOW (ref 3.5–5.3)
POTASSIUM SERPL-SCNC: 3.4 MMOL/L — LOW (ref 3.5–5.3)
RBC # BLD: 3.24 M/UL — LOW (ref 4.2–5.8)
RBC # FLD: 13.6 % — SIGNIFICANT CHANGE UP (ref 10.3–14.5)
SODIUM SERPL-SCNC: 141 MMOL/L — SIGNIFICANT CHANGE UP (ref 135–145)
VANCOMYCIN TROUGH SERPL-MCNC: 20.2 UG/ML — HIGH (ref 10–20)
VANCOMYCIN TROUGH SERPL-MCNC: 27.4 UG/ML — CRITICAL HIGH (ref 10–20)
WBC # BLD: 9.8 K/UL — SIGNIFICANT CHANGE UP (ref 3.8–10.5)
WBC # FLD AUTO: 9.8 K/UL — SIGNIFICANT CHANGE UP (ref 3.8–10.5)

## 2018-10-14 PROCEDURE — 99233 SBSQ HOSP IP/OBS HIGH 50: CPT | Mod: GC

## 2018-10-14 RX ORDER — VANCOMYCIN HCL 1 G
750 VIAL (EA) INTRAVENOUS EVERY 12 HOURS
Qty: 0 | Refills: 0 | Status: DISCONTINUED | OUTPATIENT
Start: 2018-10-14 | End: 2018-10-15

## 2018-10-14 RX ADMIN — Medication 2.5 MILLILITER(S): at 20:42

## 2018-10-14 RX ADMIN — Medication 3 MILLILITER(S): at 14:40

## 2018-10-14 RX ADMIN — Medication 3 MILLILITER(S): at 20:41

## 2018-10-14 RX ADMIN — Medication 2.5 MILLILITER(S): at 02:31

## 2018-10-14 RX ADMIN — PIPERACILLIN AND TAZOBACTAM 25 GRAM(S): 4; .5 INJECTION, POWDER, LYOPHILIZED, FOR SOLUTION INTRAVENOUS at 00:57

## 2018-10-14 RX ADMIN — Medication 250 MILLIGRAM(S): at 21:26

## 2018-10-14 RX ADMIN — DEXTROSE MONOHYDRATE, SODIUM CHLORIDE, AND POTASSIUM CHLORIDE 75 MILLILITER(S): 50; .745; 4.5 INJECTION, SOLUTION INTRAVENOUS at 12:43

## 2018-10-14 RX ADMIN — Medication 3 MILLILITER(S): at 09:47

## 2018-10-14 RX ADMIN — Medication 3 MILLILITER(S): at 02:31

## 2018-10-14 RX ADMIN — ENOXAPARIN SODIUM 30 MILLIGRAM(S): 100 INJECTION SUBCUTANEOUS at 12:43

## 2018-10-14 RX ADMIN — PIPERACILLIN AND TAZOBACTAM 25 GRAM(S): 4; .5 INJECTION, POWDER, LYOPHILIZED, FOR SOLUTION INTRAVENOUS at 16:44

## 2018-10-14 RX ADMIN — Medication 2.5 MILLILITER(S): at 14:39

## 2018-10-14 RX ADMIN — PIPERACILLIN AND TAZOBACTAM 25 GRAM(S): 4; .5 INJECTION, POWDER, LYOPHILIZED, FOR SOLUTION INTRAVENOUS at 08:49

## 2018-10-14 RX ADMIN — Medication 2.5 MILLILITER(S): at 09:46

## 2018-10-14 NOTE — PROVIDER CONTACT NOTE (CRITICAL VALUE NOTIFICATION) - RECOMMENDATIONS
vanco IVPB 6 a.m dose held
as pt Dr. Anderson, pt already receiving antibiotic treatment, no further intervention at this time

## 2018-10-14 NOTE — PROGRESS NOTE ADULT - PROBLEM SELECTOR PLAN 3
strict NPO  -IVF hydration  -discussed w/ HCP daughter Rafal who is leaning towards PEG--> would like to discuss w/ GI  -GI Dr. Dorman available to do procedure tomorrow if family in agreement--> will reach to family tomorrow

## 2018-10-14 NOTE — PROGRESS NOTE ADULT - PROBLEM SELECTOR PLAN 1
WITH MRSA INFECTION from wound  -MRI showed Findings consistent with osteomyelitis in the distal first metatarsal   neck region.  Surgical wound growing MRSA --> now in insolation  Podiatry on board recommending partial amputation, family have not given consent for surgery yet.   Continue Vanco and Zosyn as per ID.   -vancomycin supratherapuetic--> dose held and repeat level ordered

## 2018-10-14 NOTE — PROGRESS NOTE ADULT - PROBLEM SELECTOR PLAN 6
not on any medications   bedbound - non verbal, non mobile for about 14 years  Palliative care consulted, patient is full code

## 2018-10-14 NOTE — PROGRESS NOTE ADULT - SUBJECTIVE AND OBJECTIVE BOX
Patient is a 86y old  Male who presents with a chief complaint of right foot ulcers    HPI: Per Family 86 year old M Pt w/ PMHx of BPH, HTN, and Advanced Dementia presents to ED c/o right foot ulceration x 2 weeks. Pt visited podiatrist yesterday for a worsening right foot ulceration, located by his big toe. Pt was referred to ED for evaluation of possible osteomyelitis yesterday, but was unable to come to ED yesterday as it was not possible to arrange an ambulance.  NKDA  Pt seen bedside by podiatry. Pt unable to communicate, Pt's granddaughter and nephew present bedside and states he can not communicate. Pt's family denies F/N/V/SOB. states pt sent by podiatrist Dr. Valentin to evaluate if patient has osteomyelitis. Pt family denies any overnight issues. discussed with Pt's granddaughter and nephew that the patient has osteomyelitis in his 1st metarsal head and hallux and an appropriate treatment is surgical treatment that includes but not limited to amputation of the first toe and part of the 1st metatarsal. Pt's granddaughter understands and would like to discuss more with her mother (the pt's daughter) and other family members who will be present monday morning, as well as with Dr. andrade. Pt's Granddaughter states the pt's daughter is the health care proxy.      PMH:HTN (hypertension)  Advanced dementia  BPH (benign prostatic hyperplasia)    Allergies: No Known Allergies    Medications:   FH:  PSX: No significant past surgical history    SH:   ICU Vital Signs Last 24 Hrs  T(C): 37.2 (14 Oct 2018 14:00), Max: 37.6 (13 Oct 2018 20:05)  T(F): 98.9 (14 Oct 2018 14:00), Max: 99.7 (13 Oct 2018 20:05)  HR: 87 (14 Oct 2018 14:00) (81 - 87)  BP: 106/50 (14 Oct 2018 14:00) (106/50 - 125/69)  BP(mean): --  ABP: --  ABP(mean): --  RR: 18 (14 Oct 2018 14:00) (17 - 18)  SpO2: 98% (14 Oct 2018 14:00) (98% - 100%)        LABS                        8.8    9.8   )-----------( 389      ( 14 Oct 2018 04:42 )             27.7   10-14    141  |  109<H>  |  6<L>  ----------------------------<  111<H>  3.4<L>   |  26  |  0.86    Ca    7.9<L>      14 Oct 2018 04:42  Phos  2.3     10-14  Mg     1.6     10-14        	    PHYSICAL EXAM  GEN: PERLA HER is a pleasant well-nourished, well developed 86y Male in no acute distress, alert awake, and oriented to person, place and time.   LE Focused: right foot focused   Vasc:  DP/PT palpable, CFT < 3 secs x 5, no edema, TG warm to cool  Derm: ulcers: medial aspect of 1st MPJ, dorsal foot, lateral ankle, no drainage, -PTB to all, no malodor, fibrogranular base, normal borders  Neuro: unable to perform      Imaging: < from: Xray Foot AP + Lateral + Oblique, Right (10.10.18 @ 15:27) >  PROCEDURE DATE:  10/10/2018          INTERPRETATION:  CLINICAL STATEMENT: Pain. Evaluate for osteomyelitis   first toe    TECHNIQUE: AP, lateral and oblique views of right foot    COMPARISON: None.    FINDINGS:  Diffuse soft tissue swelling first digit. Flexion of the phalanges   limited evaluation of the phalanges.. Diffuse osteopenia. Difficult to   evaluate first interphalangeal joint.    Lucency at the head of the first metatarsal which may represent   osteomyelitis in the correct clinical setting. Correlate clinically.    Otherwise, no acute fracture        IMPRESSION:  Findings as described above.     < end of copied text >    ------------------------------------------------------    < from: MR Foot No Cont, Right (10.12.18 @ 15:02) >  PROCEDURE DATE:  10/12/2018          INTERPRETATION:  MRI OF THE RIGHT FOOT:    CLINICAL INDICATION: Right osteomyelitis, right foot.    TECHNIQUE: Multiplanar, muliti-sequential MRI of the right foot was   performed without contrast. The study is degraded by motion artifact.    FINDINGS:    Motion degraded images demonstrate diffuse bone marrow edema pattern and   associated low T1 signal alteration in the first metatarsal head and   first metatarsal neck region consistent with osteomyelitis. Findings   compatible with osteomyelitis is also suspected in the medial hallux   sesamoid which is diffusely low signal on the T1-weighted sequences and   markedly hyperintense on fluid sensitive sequences.  Mild bone marrow   edema pattern is also noted at the base of the first proximal phalanx   with associated subtle low T1 signal alteration.  Diffuse bone marrow   edema pattern and subtle low T1 signal alteration also noted in the   lateral hallux sesamoid, also suggestive of osteomyelitis. There is   surrounding soft tissue swelling compatible with cellulitis. There is no   drainable fluid collection or abscess. There is suggestion of skin   ulceration along the medial aspect of the first metatarsal head. The   remaining osseous structures demonstrate normal signal.     IMPRESSION:    Motion degraded study.    Findings consistent with osteomyelitis in the distal first metatarsal   neck region, throughout the first metatarsal head and throughout the   medial hallux sesamoid. Osteomyelitis is also suspected at the base of   the first proximal phalanx as well as within the lateral hallux sesamoid.   There is surrounding cellulitis. There is no drainable fluid collection   or abscess.    < end of copied text >  -------------------------------------------------------------    Culture - Surgical Swab (10.11.18 @ 04:04)    Specimen Source: .Surgical Swab right foot ulcer    Culture Results:   Numerous Staphylococcus aureus      Cultures: preliminary    A:   right foot ulcers  Osteomyelitis    P:  pt evaluated and chart reviewed  culture - preliminary results above  xrays reviewed see above  MRI results see above  Discussed with Dr. Andrade,   discussed with family results of MRI and treatment options including 1st ray partial amputation as well as 6-8 weeks of IV antibiotics, Pt's granddaughter and nephew understand but would still like to discuss with the rest of the family including the pt's daughter, who is the health care proxy, who will be bedside on monday morning and with Dr. Andrade before making any decision.  pending Medical clearance and family's agreement, Plan to take to OR next week for partial amputation of 1st ray of Right foot if family agrees.   IV antibiotics as per ID  Podiatry to follow while in house

## 2018-10-14 NOTE — PROGRESS NOTE ADULT - SUBJECTIVE AND OBJECTIVE BOX
Patient is a 86y old  Male who presents with a chief complaint of left foot ulcer (14 Oct 2018 17:12)      INTERVAL HPI/OVERNIGHT EVENTS: ROS not meaningfully obtained. No reported events overnight. Lying comfortably in bed      MEDICATIONS  (STANDING):  acetylcysteine 10% Inhalation 2.5 milliLiter(s) Inhalation every 6 hours  ALBUTerol/ipratropium for Nebulization 3 milliLiter(s) Nebulizer every 6 hours  aspirin enteric coated 81 milliGRAM(s) Oral daily  dextrose 5% + sodium chloride 0.9% with potassium chloride 20 mEq/L 1000 milliLiter(s) (75 mL/Hr) IV Continuous <Continuous>  enoxaparin Injectable 30 milliGRAM(s) SubCutaneous daily  finasteride 5 milliGRAM(s) Oral daily  influenza   Vaccine 0.5 milliLiter(s) IntraMuscular once  piperacillin/tazobactam IVPB. 3.375 Gram(s) IV Intermittent every 8 hours  tamsulosin 0.4 milliGRAM(s) Oral at bedtime  vancomycin  IVPB 750 milliGRAM(s) IV Intermittent every 12 hours    MEDICATIONS  (PRN):      __________________________________________________  Vital Signs Last 24 Hrs  Temp 98 118/51  P79 RR18 100%  ________________________________________________  PHYSICAL EXAM:  GENERAL: NAD  PE vitals reviewed  cachectic, eyes open, non responsive/not interactive, non verbal baseline  PERRLA  coarse upper airway sounds  S1S2 RRR  right arm swelling   right foot in dressing  _________________________________________________  LABS:                        8.8    9.8   )-----------( 389      ( 14 Oct 2018 04:42 )             27.7     10-14    141  |  109<H>  |  6<L>  ----------------------------<  111<H>  3.4<L>   |  26  |  0.86    Ca    7.9<L>      14 Oct 2018 04:42  Phos  2.3     10-14  Mg     1.6     10-14          CAPILLARY BLOOD GLUCOSE      POCT Blood Glucose.: 104 mg/dL (15 Oct 2018 06:03)  POCT Blood Glucose.: 137 mg/dL (15 Oct 2018 00:49)  POCT Blood Glucose.: 120 mg/dL (14 Oct 2018 21:14)  POCT Blood Glucose.: 94 mg/dL (14 Oct 2018 16:58)  POCT Blood Glucose.: 106 mg/dL (14 Oct 2018 12:06)        RADIOLOGY & ADDITIONAL TESTS:    Imaging Personally Reviewed:  YES/NO    Consultant(s) Notes Reviewed:   YES/ No    Care Discussed with Consultants :     Plan of care was discussed with patient and /or primary care giver; all questions and concerns were addressed and care was aligned with patient's wishes.

## 2018-10-15 DIAGNOSIS — J69.0 PNEUMONITIS DUE TO INHALATION OF FOOD AND VOMIT: ICD-10-CM

## 2018-10-15 DIAGNOSIS — R13.10 DYSPHAGIA, UNSPECIFIED: ICD-10-CM

## 2018-10-15 LAB
ALBUMIN SERPL ELPH-MCNC: 1.9 G/DL — LOW (ref 3.5–5)
ALP SERPL-CCNC: 58 U/L — SIGNIFICANT CHANGE UP (ref 40–120)
ALT FLD-CCNC: 39 U/L DA — SIGNIFICANT CHANGE UP (ref 10–60)
ANION GAP SERPL CALC-SCNC: 7 MMOL/L — SIGNIFICANT CHANGE UP (ref 5–17)
AST SERPL-CCNC: 28 U/L — SIGNIFICANT CHANGE UP (ref 10–40)
BASOPHILS # BLD AUTO: 0 K/UL — SIGNIFICANT CHANGE UP (ref 0–0.2)
BASOPHILS NFR BLD AUTO: 0.3 % — SIGNIFICANT CHANGE UP (ref 0–2)
BILIRUB DIRECT SERPL-MCNC: 0.2 MG/DL — SIGNIFICANT CHANGE UP (ref 0–0.2)
BILIRUB INDIRECT FLD-MCNC: 0.4 MG/DL — SIGNIFICANT CHANGE UP (ref 0.2–1)
BILIRUB SERPL-MCNC: 0.6 MG/DL — SIGNIFICANT CHANGE UP (ref 0.2–1.2)
BUN SERPL-MCNC: 6 MG/DL — LOW (ref 7–18)
CALCIUM SERPL-MCNC: 7.7 MG/DL — LOW (ref 8.4–10.5)
CHLORIDE SERPL-SCNC: 110 MMOL/L — HIGH (ref 96–108)
CO2 SERPL-SCNC: 23 MMOL/L — SIGNIFICANT CHANGE UP (ref 22–31)
CREAT SERPL-MCNC: 0.84 MG/DL — SIGNIFICANT CHANGE UP (ref 0.5–1.3)
CULTURE RESULTS: SIGNIFICANT CHANGE UP
CULTURE RESULTS: SIGNIFICANT CHANGE UP
EOSINOPHIL # BLD AUTO: 0 K/UL — SIGNIFICANT CHANGE UP (ref 0–0.5)
EOSINOPHIL NFR BLD AUTO: 0.3 % — SIGNIFICANT CHANGE UP (ref 0–6)
GLUCOSE BLDC GLUCOMTR-MCNC: 100 MG/DL — HIGH (ref 70–99)
GLUCOSE BLDC GLUCOMTR-MCNC: 104 MG/DL — HIGH (ref 70–99)
GLUCOSE BLDC GLUCOMTR-MCNC: 120 MG/DL — HIGH (ref 70–99)
GLUCOSE BLDC GLUCOMTR-MCNC: 137 MG/DL — HIGH (ref 70–99)
GLUCOSE BLDC GLUCOMTR-MCNC: 99 MG/DL — SIGNIFICANT CHANGE UP (ref 70–99)
GLUCOSE SERPL-MCNC: 99 MG/DL — SIGNIFICANT CHANGE UP (ref 70–99)
HCT VFR BLD CALC: 26.9 % — LOW (ref 39–50)
HGB BLD-MCNC: 8.5 G/DL — LOW (ref 13–17)
LYMPHOCYTES # BLD AUTO: 2.4 K/UL — SIGNIFICANT CHANGE UP (ref 1–3.3)
LYMPHOCYTES # BLD AUTO: 21.7 % — SIGNIFICANT CHANGE UP (ref 13–44)
MAGNESIUM SERPL-MCNC: 1.6 MG/DL — SIGNIFICANT CHANGE UP (ref 1.6–2.6)
MCHC RBC-ENTMCNC: 27.1 PG — SIGNIFICANT CHANGE UP (ref 27–34)
MCHC RBC-ENTMCNC: 31.7 GM/DL — LOW (ref 32–36)
MCV RBC AUTO: 85.4 FL — SIGNIFICANT CHANGE UP (ref 80–100)
MONOCYTES # BLD AUTO: 1 K/UL — HIGH (ref 0–0.9)
MONOCYTES NFR BLD AUTO: 8.6 % — SIGNIFICANT CHANGE UP (ref 2–14)
NEUTROPHILS # BLD AUTO: 7.7 K/UL — HIGH (ref 1.8–7.4)
NEUTROPHILS NFR BLD AUTO: 69 % — SIGNIFICANT CHANGE UP (ref 43–77)
PHOSPHATE SERPL-MCNC: 2.1 MG/DL — LOW (ref 2.5–4.5)
PLATELET # BLD AUTO: 394 K/UL — SIGNIFICANT CHANGE UP (ref 150–400)
POTASSIUM SERPL-MCNC: 3.6 MMOL/L — SIGNIFICANT CHANGE UP (ref 3.5–5.3)
POTASSIUM SERPL-SCNC: 3.6 MMOL/L — SIGNIFICANT CHANGE UP (ref 3.5–5.3)
PROT SERPL-MCNC: 6.3 G/DL — SIGNIFICANT CHANGE UP (ref 6–8.3)
RBC # BLD: 3.14 M/UL — LOW (ref 4.2–5.8)
RBC # FLD: 13.9 % — SIGNIFICANT CHANGE UP (ref 10.3–14.5)
SODIUM SERPL-SCNC: 140 MMOL/L — SIGNIFICANT CHANGE UP (ref 135–145)
SPECIMEN SOURCE: SIGNIFICANT CHANGE UP
SPECIMEN SOURCE: SIGNIFICANT CHANGE UP
VANCOMYCIN TROUGH SERPL-MCNC: 21.1 UG/ML — HIGH (ref 10–20)
WBC # BLD: 11.2 K/UL — HIGH (ref 3.8–10.5)
WBC # FLD AUTO: 11.2 K/UL — HIGH (ref 3.8–10.5)

## 2018-10-15 PROCEDURE — 99223 1ST HOSP IP/OBS HIGH 75: CPT

## 2018-10-15 PROCEDURE — 99233 SBSQ HOSP IP/OBS HIGH 50: CPT | Mod: GC

## 2018-10-15 RX ORDER — POTASSIUM PHOSPHATE, MONOBASIC POTASSIUM PHOSPHATE, DIBASIC 236; 224 MG/ML; MG/ML
15 INJECTION, SOLUTION INTRAVENOUS ONCE
Qty: 0 | Refills: 0 | Status: COMPLETED | OUTPATIENT
Start: 2018-10-15 | End: 2018-10-15

## 2018-10-15 RX ADMIN — Medication 2.5 MILLILITER(S): at 14:26

## 2018-10-15 RX ADMIN — Medication 3 MILLILITER(S): at 09:42

## 2018-10-15 RX ADMIN — PIPERACILLIN AND TAZOBACTAM 25 GRAM(S): 4; .5 INJECTION, POWDER, LYOPHILIZED, FOR SOLUTION INTRAVENOUS at 11:58

## 2018-10-15 RX ADMIN — ENOXAPARIN SODIUM 30 MILLIGRAM(S): 100 INJECTION SUBCUTANEOUS at 11:58

## 2018-10-15 RX ADMIN — DEXTROSE MONOHYDRATE, SODIUM CHLORIDE, AND POTASSIUM CHLORIDE 75 MILLILITER(S): 50; .745; 4.5 INJECTION, SOLUTION INTRAVENOUS at 23:03

## 2018-10-15 RX ADMIN — POTASSIUM PHOSPHATE, MONOBASIC POTASSIUM PHOSPHATE, DIBASIC 62.5 MILLIMOLE(S): 236; 224 INJECTION, SOLUTION INTRAVENOUS at 16:27

## 2018-10-15 RX ADMIN — Medication 3 MILLILITER(S): at 02:18

## 2018-10-15 RX ADMIN — Medication 2.5 MILLILITER(S): at 09:42

## 2018-10-15 RX ADMIN — PIPERACILLIN AND TAZOBACTAM 25 GRAM(S): 4; .5 INJECTION, POWDER, LYOPHILIZED, FOR SOLUTION INTRAVENOUS at 18:29

## 2018-10-15 RX ADMIN — PIPERACILLIN AND TAZOBACTAM 25 GRAM(S): 4; .5 INJECTION, POWDER, LYOPHILIZED, FOR SOLUTION INTRAVENOUS at 00:51

## 2018-10-15 RX ADMIN — Medication 3 MILLILITER(S): at 14:26

## 2018-10-15 RX ADMIN — Medication 2.5 MILLILITER(S): at 02:18

## 2018-10-15 RX ADMIN — Medication 2.5 MILLILITER(S): at 22:00

## 2018-10-15 RX ADMIN — Medication 3 MILLILITER(S): at 22:00

## 2018-10-15 NOTE — PROGRESS NOTE ADULT - SUBJECTIVE AND OBJECTIVE BOX
87 y/o male is under our care for aspiration pneumonia and right great toe osteo with MRSA. Family is steering towards PEG placement, they will decide later today. Remains afebrile with mild leukocytosis.     REVIEW OF SYSTEMS:  [ X ] Not able to illicit     ALLERGIES: No Known Allergies    MEDS:  piperacillin/tazobactam IVPB. 3.375 Gram(s) IV Intermittent every 8 hours    VITALS:  Vital Signs Last 24 Hrs  T(C): 37.4 (15 Oct 2018 14:23), Max: 37.4 (15 Oct 2018 14:23)  T(F): 99.3 (15 Oct 2018 14:23), Max: 99.3 (15 Oct 2018 14:23)  HR: 85 (15 Oct 2018 14:23) (77 - 85)  BP: 119/43 (15 Oct 2018 14:23) (118/51 - 134/59)  BP(mean): --  RR: 18 (15 Oct 2018 14:23) (18 - 18)  SpO2: 99% (15 Oct 2018 14:23) (99% - 100%)    PHYSICAL EXAM:  HEENT: bilateral temporal wasting, dry mouth and adentulous   Neck: stiff arthritic neck no LN's   Respiratory: bilateral coarse rhonchorous sounds    Cardiovascular: S1 S2 reg no murmurs  Gastrointestinal: +BS with soft, nondistended abdomen; nontender  Extremities: no edema   Skin: right foot is wrapped  Ortho: mildly contracted BLE  Neuro: demented       LABS/DIAGNOSTIC TESTS:                        8.5    11.2  )-----------( 394      ( 15 Oct 2018 10:55 )             26.9   WBC Count: 11.2 K/uL (10-15 @ 10:55)  WBC Count: 9.8 K/uL (10-14 @ 04:42)  WBC Count: 11.4 K/uL (10-13 @ 07:53)  WBC Count: 16.3 K/uL (10-12 @ 06:32)  WBC Count: 9.8 K/uL (10-11 @ 07:37)    10-15    140  |  110<H>  |  6<L>  ----------------------------<  99  3.6   |  23  |  0.84    Ca    7.7<L>      15 Oct 2018 10:55  Phos  2.1     10-15  Mg     1.6     10-15    TPro  6.3  /  Alb  1.9<L>  /  TBili  0.6  /  DBili  0.2  /  AST  28  /  ALT  39  /  AlkPhos  58  10-15    Vancomycin Level, Trough (10.15.18 @ 10:55)    Vancomycin Level, Trough: 21.1        CULTURES:   .Surgical Swab right foot ulcer  10-11 @ 04:04   Numerous Staphylococcus aureus  --  --      .Urine Catheterized  10-10 @ 23:57   No growth  --  --      .Blood Blood-Peripheral  10-10 @ 18:23   No growth to date.  --  --        RADIOLOGY: No new studies

## 2018-10-15 NOTE — PROGRESS NOTE ADULT - SUBJECTIVE AND OBJECTIVE BOX
Patient is a 86y old  Male who presents with a chief complaint of right foot ulcers    HPI: Per Family 86 year old M Pt w/ PMHx of BPH, HTN, and Advanced Dementia presents to ED c/o right foot ulceration x 2 weeks. Pt visited podiatrist yesterday for a worsening right foot ulceration, located by his big toe. Pt was referred to ED for evaluation of possible osteomyelitis yesterday, but was unable to come to ED yesterday as it was not possible to arrange an ambulance.  NKDA  Pt seen bedside by podiatry. Pt unable to communicate, Pt's granddaughter present bedside and states he can not communicate. Pt's family denies F/N/V/SOB. states pt sent by podiatrist Dr. Valentin to evaluate if patient has osteomyelitis. Pt family denies any overnight issues. discussed with Pt's granddaughter that the patient has osteomyelitis in his 1st metarsal head and hallux Dr. Almeida present. discussed with Pt's granddaughter plan to continue IV antibiotics and local wound care due to chronic nature of infection.     PMH:HTN (hypertension)  Advanced dementia  BPH (benign prostatic hyperplasia)    Allergies: No Known Allergies    Medications:   FH:  PSX: No significant past surgical history    SH:     ICU Vital Signs Last 24 Hrs  T(C): 36.7 (15 Oct 2018 05:30), Max: 37.2 (14 Oct 2018 14:00)  T(F): 98 (15 Oct 2018 05:30), Max: 98.9 (14 Oct 2018 14:00)  HR: 79 (15 Oct 2018 05:30) (77 - 87)  BP: 118/51 (15 Oct 2018 05:30) (106/50 - 134/59)  BP(mean): --  ABP: --  ABP(mean): --  RR: 18 (15 Oct 2018 05:30) (18 - 18)  SpO2: 100% (15 Oct 2018 05:30) (98% - 100%)          LABS                        8.5    11.2  )-----------( 394      ( 15 Oct 2018 10:55 )             26.9   10-14    141  |  109<H>  |  6<L>  ----------------------------<  111<H>  3.4<L>   |  26  |  0.86    Ca    7.9<L>      14 Oct 2018 04:42  Phos  2.3     10-14  Mg     1.6     10-14        PHYSICAL EXAM  GEN: PERLA HER is a pleasant well-nourished, well developed 86y Male in no acute distress, alert awake, and oriented to person, place and time.   LE Focused: right foot focused   Vasc:  DP/PT palpable, CFT < 3 secs x 5, no edema, TG warm to cool  Derm: ulcers: medial aspect of 1st MPJ, dorsal foot, lateral ankle, no drainage, -PTB to all, no malodor, fibrogranular base, normal borders  Neuro: unable to perform      Imaging: < from: Xray Foot AP + Lateral + Oblique, Right (10.10.18 @ 15:27) >  PROCEDURE DATE:  10/10/2018          INTERPRETATION:  CLINICAL STATEMENT: Pain. Evaluate for osteomyelitis   first toe    TECHNIQUE: AP, lateral and oblique views of right foot    COMPARISON: None.    FINDINGS:  Diffuse soft tissue swelling first digit. Flexion of the phalanges   limited evaluation of the phalanges.. Diffuse osteopenia. Difficult to   evaluate first interphalangeal joint.    Lucency at the head of the first metatarsal which may represent   osteomyelitis in the correct clinical setting. Correlate clinically.    Otherwise, no acute fracture        IMPRESSION:  Findings as described above.     < end of copied text >    ------------------------------------------------------    < from: MR Foot No Cont, Right (10.12.18 @ 15:02) >  PROCEDURE DATE:  10/12/2018          INTERPRETATION:  MRI OF THE RIGHT FOOT:    CLINICAL INDICATION: Right osteomyelitis, right foot.    TECHNIQUE: Multiplanar, muliti-sequential MRI of the right foot was   performed without contrast. The study is degraded by motion artifact.    FINDINGS:    Motion degraded images demonstrate diffuse bone marrow edema pattern and   associated low T1 signal alteration in the first metatarsal head and   first metatarsal neck region consistent with osteomyelitis. Findings   compatible with osteomyelitis is also suspected in the medial hallux   sesamoid which is diffusely low signal on the T1-weighted sequences and   markedly hyperintense on fluid sensitive sequences.  Mild bone marrow   edema pattern is also noted at the base of the first proximal phalanx   with associated subtle low T1 signal alteration.  Diffuse bone marrow   edema pattern and subtle low T1 signal alteration also noted in the   lateral hallux sesamoid, also suggestive of osteomyelitis. There is   surrounding soft tissue swelling compatible with cellulitis. There is no   drainable fluid collection or abscess. There is suggestion of skin   ulceration along the medial aspect of the first metatarsal head. The   remaining osseous structures demonstrate normal signal.     IMPRESSION:    Motion degraded study.    Findings consistent with osteomyelitis in the distal first metatarsal   neck region, throughout the first metatarsal head and throughout the   medial hallux sesamoid. Osteomyelitis is also suspected at the base of   the first proximal phalanx as well as within the lateral hallux sesamoid.   There is surrounding cellulitis. There is no drainable fluid collection   or abscess.    < end of copied text >  -------------------------------------------------------------    Culture - Surgical Swab (10.11.18 @ 04:04)    Specimen Source: .Surgical Swab right foot ulcer    Culture Results:   Numerous Staphylococcus aureus      Cultures: preliminary    A:   right foot ulcers  Osteomyelitis    P:  pt evaluated and chart reviewed  culture - preliminary results above  dressing changed, DSD  xrays reviewed see above  MRI results see above  Dr. Almeida present, discussed with family results of MRI and treatment options and choosing IV antibiotics due to risks of surgery outweighing benefits of surgery. Pt's granddaughter understands and agrees  all questions answered to patients satisfaction.   No plan for surgical intervention at this time  IV antibiotics as per ID  pt stable from podiatric standpoint  Podiatry to follow while in house

## 2018-10-15 NOTE — PROGRESS NOTE ADULT - PROBLEM SELECTOR PLAN 2
continue with zosyn  aspiration precautions  HOB 45 degrees  Pt needs PEG tube placement by Dr. Mejia, for which family is actively considering.

## 2018-10-15 NOTE — PROGRESS NOTE ADULT - SUBJECTIVE AND OBJECTIVE BOX
Patient is a 86y old  Male who presents with a chief complaint of Rt foot ulcer (15 Oct 2018 11:17)      INTERVAL HPI/OVERNIGHT EVENTS: No specific complaints, pt is nonverbal, daughter at bedside. Surgical wound culture + MRSA.    T(C): 36.7 (10-15-18 @ 05:30), Max: 37.2 (10-14-18 @ 14:00)  HR: 79 (10-15-18 @ 05:30) (77 - 87)  BP: 118/51 (10-15-18 @ 05:30) (106/50 - 134/59)  RR: 18 (10-15-18 @ 05:30) (18 - 18)  SpO2: 100% (10-15-18 @ 05:30) (98% - 100%)  Wt(kg): --  I&O's Summary      LABS:                        8.5    11.2  )-----------( 394      ( 15 Oct 2018 10:55 )             26.9     10-15    140  |  110<H>  |  6<L>  ----------------------------<  99  3.6   |  23  |  0.84    Ca    7.7<L>      15 Oct 2018 10:55  Phos  2.1     10-15  Mg     1.6     10-15    TPro  6.3  /  Alb  1.9<L>  /  TBili  0.6  /  DBili  0.2  /  AST  28  /  ALT  39  /  AlkPhos  58  10-15      Vancomycin Level, Trough: 21.1: Vancomycin trough levels should be rapidly reached and maintained at  15-20 ug/ml for life threatening MRSA  infections such as sepsis, endocarditis, osteomyelitis and pneumonia. A  first trough level should be drawn  before the 3rd or 4th dose.  Risk of renal toxicity is increased for levels >15 ug/ml, in patients on  other nephrotoxic drugs, who are  hemodynamically unstable, have unstable renal function, or are on  Vancomycin therapy for >14 days. Renal function with  creatinine levels should be monitored for those patients. ug/mL (10.15.18 @ 10:55)    Culture - Surgical Swab (10.11.18 @ 04:04)    -  Gentamicin: S 2 Should not be used as monotherapy    -  Linezolid: S 4    -  Oxacillin: R 1    -  Penicillin: R >8    -  RIF- Rifampin: S <=1 Should not be used as monotherapy    -  Tetra/Doxy: R >8    -  Trimethoprim/Sulfamethoxazole: S <=0.5/9.5    -  Vancomycin: S 2    -  Ampicillin/Sulbactam: R <=8/4    -  Cefazolin: R <=4    -  Clindamycin: S 0.5    -  Daptomycin: S 0.5    -  Erythromycin: R >4    Specimen Source: .Surgical Swab right foot ulcer    Culture Results:   Numerous Methicillin resistant Staphylococcus aureus    Organism Identification: Methicillin resistant Staphylococcus aureus    Organism: Methicillin resistant Staphylococcus aureus    Method Type: ALAN          CAPILLARY BLOOD GLUCOSE      POCT Blood Glucose.: 100 mg/dL (15 Oct 2018 11:46)  POCT Blood Glucose.: 99 mg/dL (15 Oct 2018 08:01)  POCT Blood Glucose.: 104 mg/dL (15 Oct 2018 06:03)  POCT Blood Glucose.: 137 mg/dL (15 Oct 2018 00:49)  POCT Blood Glucose.: 120 mg/dL (14 Oct 2018 21:14)  POCT Blood Glucose.: 94 mg/dL (14 Oct 2018 16:58)    LIVER FUNCTIONS - ( 15 Oct 2018 10:55 )  Alb: 1.9 g/dL / Pro: 6.3 g/dL / ALK PHOS: 58 U/L / ALT: 39 U/L DA / AST: 28 U/L / GGT: x                   MEDICATIONS  (STANDING):  acetylcysteine 10% Inhalation 2.5 milliLiter(s) Inhalation every 6 hours  ALBUTerol/ipratropium for Nebulization 3 milliLiter(s) Nebulizer every 6 hours  aspirin enteric coated 81 milliGRAM(s) Oral daily  dextrose 5% + sodium chloride 0.9% with potassium chloride 20 mEq/L 1000 milliLiter(s) (75 mL/Hr) IV Continuous <Continuous>  enoxaparin Injectable 30 milliGRAM(s) SubCutaneous daily  finasteride 5 milliGRAM(s) Oral daily  influenza   Vaccine 0.5 milliLiter(s) IntraMuscular once  piperacillin/tazobactam IVPB. 3.375 Gram(s) IV Intermittent every 8 hours  tamsulosin 0.4 milliGRAM(s) Oral at bedtime    MEDICATIONS  (PRN):      RADIOLOGY & ADDITIONAL TESTS:    Imaging Personally Reviewed:  [x ] YES  [ ] NO      REVIEW OF SYSTEMS: Limited as patient is nonverbal and bedbound.  CONSTITUTIONAL: No fever  EYES: No discharge  NECK: No stiffness  RESPIRATORY: + sputum, improved  CARDIOVASCULAR: No palpitations  GASTROINTESTINAL: No nausea, vomiting, or hematemesis; No diarrhea or constipation.  NEUROLOGICAL: No tremors  MUSCULOSKELETAL: right foot ulcer    PHYSICAL EXAM:  GENERAL: NAD, cachectic  HEAD:  Atraumatic, Normocephalic  EYES: PERRLA, conjunctiva and sclera clear  NECK: Supple, No JVD, Normal thyroid  NERVOUS SYSTEM:  demented  CHEST/LUNG: rhonchi B/L, mild RUL rales, no wheezing, or rubs  HEART: Regular rate and rhythm; No murmurs, rubs, or gallops  ABDOMEN: Soft, Nontender, Nondistended; Bowel sounds present  EXTREMITIES:  Right foot ulcers  LYMPH: No lymphadenopathy noted  SKIN: right foot ulcers    Care Discussed with Consultants/Other Providers [x ] YES  [ ] NO

## 2018-10-15 NOTE — PROGRESS NOTE ADULT - ASSESSMENT
1.	Right 1st toe acute osteomyelitis with MRSA  2.	Aspiration pneumonia   3.	Leukocytosis - mild  ·	cont zosyn 3.375gm IV to q8h D6  ·	vanco on hold  ·	check vanco trough in am

## 2018-10-15 NOTE — PROGRESS NOTE ADULT - PROBLEM SELECTOR PLAN 7
As per previous documentation. HCP is  daughter Ms. Donald [phone] and patient is full code  Palliative team Dr. Bello on board.

## 2018-10-15 NOTE — ADVANCED PRACTICE NURSE CONSULT - ASSESSMENT
This is a 86yr old male patient admitted for Osteomyelitis, presenting with L. Foot Ulcerations, to which the patient is being followed by Podiatry with a treatment plan in place to address this issue. There is currently no further need for wound care specialist consultation at this time.

## 2018-10-15 NOTE — CONSULT NOTE ADULT - CONSULT REASON
dementia, bedbound, diabetic foot ulcer, dysphagia, goals of care
Right first ulcer ?osteo, pneumonia
right foot ulcers
Peg tube placement

## 2018-10-15 NOTE — CONSULT NOTE ADULT - ASSESSMENT
86 year old male with FTT and oropharyngeal dysphagia I had extensive discussion with the daughter Kiana at bedside I explained the PEG tube process and stated that it will not prolong his life or decrease the risk of aspiration. She will discuss it with her family but likely would like ot proceed with the procedure.    Plan  PEG tube tomorrow morning if family agrees   Update coagulation profile
1.	Right foot acute osteo  2.	Aspiration pneumonia   3.	Leukocytosis - resolved  ·	increased zosyn 3.375gm IV to q8h  D2  ·	cont vanco 750mg IV q12h  D2  ·	awaiting culture results   ·	awaiting MRI of right foot to be done

## 2018-10-15 NOTE — CONSULT NOTE ADULT - SUBJECTIVE AND OBJECTIVE BOX
86/ M Pt with PMH of BPH, HTN, and Advanced Dementia presents to ED c/o right foot ulceration x 2 weeks. Pt visited podiatrist yesterday for a worsening right foot ulceration, located by his big toe form where he was sent to hospital for further work up. Mr. Ghosh was diagnosed with osteomyelitis Gi was consulted for failure to thrive and supplemental nutritional support.         REVIEW OF SYSTEMS  N/A  ___________________________________________________________________________________________  Allergies    No Known Allergies    Intolerances      MEDICATIONS  (STANDING):  acetylcysteine 10% Inhalation 2.5 milliLiter(s) Inhalation every 6 hours  ALBUTerol/ipratropium for Nebulization 3 milliLiter(s) Nebulizer every 6 hours  aspirin enteric coated 81 milliGRAM(s) Oral daily  dextrose 5% + sodium chloride 0.9% with potassium chloride 20 mEq/L 1000 milliLiter(s) (75 mL/Hr) IV Continuous <Continuous>  enoxaparin Injectable 30 milliGRAM(s) SubCutaneous daily  finasteride 5 milliGRAM(s) Oral daily  influenza   Vaccine 0.5 milliLiter(s) IntraMuscular once  piperacillin/tazobactam IVPB. 3.375 Gram(s) IV Intermittent every 8 hours  tamsulosin 0.4 milliGRAM(s) Oral at bedtime    MEDICATIONS  (PRN):      PAST MEDICAL & SURGICAL HISTORY:  HTN (hypertension)  Advanced dementia  BPH (benign prostatic hyperplasia)  No significant past surgical history    FAMILY HISTORY:  No pertinent family history in first degree relatives    Social History: No history of : Tobacco use, IVDA, EToH  ______________________________________________________________________________________    PHYSICAL EXAM    Daily     Daily   BMI: 15.7 (10-10 @ 15:50)  Change in Weight:  Vital Signs Last 24 Hrs  T(C): 36.2 (15 Oct 2018 20:46), Max: 37.4 (15 Oct 2018 14:23)  T(F): 97.1 (15 Oct 2018 20:46), Max: 99.3 (15 Oct 2018 14:23)  HR: 91 (15 Oct 2018 20:46) (79 - 91)  BP: 117/42 (15 Oct 2018 20:46) (117/42 - 119/43)  BP(mean): --  RR: 18 (15 Oct 2018 20:46) (18 - 18)  SpO2: 97% (15 Oct 2018 20:46) (97% - 100%)    General:  , NAD.  HEENT:    Normal appearance of conjunctiva, ears, nose, lips, oropharynx, and oral mucosa, anicteric..   Cardiovascular:  RRR normal S1/S2, no murmur.  Respiratory:  CTA B/L, normal respiratory effort.   Abdominal:   soft, no masses or tenderness, normoactive BS, NT/ND, no HSM.  Extremities:   No clubbing or cyanosis, normal capillary refill, no edema.     _______________________________________________________________________________________________  Lab Results:                          8.5    11.2  )-----------( 394      ( 15 Oct 2018 10:55 )             26.9     10-15    140  |  110<H>  |  6<L>  ----------------------------<  99  3.6   |  23  |  0.84    Ca    7.7<L>      15 Oct 2018 10:55  Phos  2.1     10-15  Mg     1.6     10-15    TPro  6.3  /  Alb  1.9<L>  /  TBili  0.6  /  DBili  0.2  /  AST  28  /  ALT  39  /  AlkPhos  58  10-15    LIVER FUNCTIONS - ( 15 Oct 2018 10:55 )  Alb: 1.9 g/dL / Pro: 6.3 g/dL / ALK PHOS: 58 U/L / ALT: 39 U/L DA / AST: 28 U/L / GGT: x                   Stool Results:          RADIOLOGY RESULTS:    SURGICAL PATHOLOGY:

## 2018-10-15 NOTE — PROGRESS NOTE ADULT - PROBLEM SELECTOR PLAN 6
IMPROVE VTE Individual Risk Assessment          RISK                                                          Points  [  ] Previous VTE                                                3  [  ] Thrombophilia                                             2  [  ] Lower limb paralysis                                   2        (unable to hold up >15 seconds)    [  ] Current Cancer                                             2         (within 6 months)  [ x ] Immobilization > 24 hrs                              1  [  ] ICU/CCU stay > 24 hours                             1  [ x ] Age > 60                                                         1    IMPROVE VTE Score: 2  continue with lovenox

## 2018-10-15 NOTE — PROGRESS NOTE ADULT - ATTENDING COMMENTS
Patient seen and examined; Agree with PGY 3 A/P above with editing as needed. Discussed plan of care with PGY3 Dr. Horn.     See Attending Addendum Chart Note for details    Discussed with Patient daughter at length.

## 2018-10-15 NOTE — CHART NOTE - NSCHARTNOTEFT_GEN_A_CORE
Attending Addendum to Resident Progress Note:    Patient seen and examined; Agree with PGY 3 A/P which was discussed with PGY3 Dr. Horn over the phone    Patient is doing clinically same with bedbound, non verbal status with upper airway secretions, unable to offer complaints.     Vital Signs Last 24 Hrs  T(C): 36.7 (15 Oct 2018 05:30), Max: 37.2 (14 Oct 2018 14:00)  T(F): 98 (15 Oct 2018 05:30), Max: 98.9 (14 Oct 2018 14:00)  HR: 79 (15 Oct 2018 05:30) (77 - 87)  BP: 118/51 (15 Oct 2018 05:30) (106/50 - 134/59)  RR: 18 (15 Oct 2018 05:30) (18 - 18)  SpO2: 100% (15 Oct 2018 05:30) (98% - 100%)    P/E:  Neuro: Limited  CVS:" S1S2 present, Regular  Resp: BLAE+, No wheeze; Coarse Breath sounds Left hemithorax  GI: Soft, BS+, NT, ND  Extr; Contracted with no edema or calf tenderness and toe ulcers (s/p dressing by Podiatry)     Labs:                       8.5    11.2  )-----------( 394      ( 15 Oct 2018 10:55 )             26.9   10-15    140  |  110<H>  |  6<L>  ----------------------------<  99  3.6   |  23  |  0.84    Ca    7.7<L>      15 Oct 2018 10:55  Phos  2.1     10-15  Mg     1.6     10-15    TPro  x   /  Alb  x   /  TBili  0.6  /  DBili  x   /  AST  28  /  ALT  x   /  AlkPhos  x   10-15    D/D Attending Addendum to Resident Progress Note:    Patient seen and examined; Agree with PGY 3 A/P which was discussed with PGY3 Dr. Horn over the phone    Patient is doing clinically same with bedbound, non verbal status with upper airway secretions, unable to offer complaints.     Vital Signs Last 24 Hrs  T(C): 36.7 (15 Oct 2018 05:30), Max: 37.2 (14 Oct 2018 14:00)  T(F): 98 (15 Oct 2018 05:30), Max: 98.9 (14 Oct 2018 14:00)  HR: 79 (15 Oct 2018 05:30) (77 - 87)  BP: 118/51 (15 Oct 2018 05:30) (106/50 - 134/59)  RR: 18 (15 Oct 2018 05:30) (18 - 18)  SpO2: 100% (15 Oct 2018 05:30) (98% - 100%)    P/E:  Neuro: Limited  CVS:" S1S2 present, Regular  Resp: BLAE+, No wheeze; Coarse Breath sounds Left hemithorax  GI: Soft, BS+, NT, ND  Extr; Contracted with no edema or calf tenderness and toe ulcers (s/p dressing by Podiatry)     Labs:                       8.5    11.2  )-----------( 394      ( 15 Oct 2018 10:55 )             26.9   10-15    140  |  110<H>  |  6<L>  ----------------------------<  99  3.6   |  23  |  0.84    Ca    7.7<L>      15 Oct 2018 10:55  Phos  2.1     10-15  Mg     1.6     10-15    TPro  x   /  Alb  x   /  TBili  0.6  /  DBili  x   /  AST  28  /  ALT  x   /  AlkPhos  x   10-15    MR Foot No Cont, Right (10.12.18 @ 15:02)    IMPRESSION: Motion degraded study.    Findings consistent with osteomyelitis in the distal first metatarsal neck region, throughout the first metatarsal head and throughout the medial hallux sesamoid. Osteomyelitis is also suspected at the base of the first proximal phalanx as well as within the lateral hallux sesamoid. There is surrounding cellulitis. There is no drainable fluid collection   or abscess.          D/D:  1.  sepsis on admission (elevated lactate, leukocytosis,  tachycardia) s/t  2. cellulitis infected ulcer of right foot with osteomyelitis of right great toe  3. suspected aspiration pneumonia with RLL consolidation  4. enterovirus infection/bronchitis  5. severe protein calorie malnutrition  6. advanced alzheimer's dementia bedbound status s/p CVA  7. dysphagia  8. debility    Plan:  -continue with zosyn and vancomycin; Holed Vanco until Trough less than 15then will dose accordingly  -ID follow up  -NPO for risk of Aspiration  -Discussed with HCP daughter at bedside at length, patient overall prognosis as well as benefits and risk of Tube feeds   -She also understands need for learning feeds as eventually she as well as her Mom would like patient to be home  -She is agreeable for PEG and needed confirmation from her Mom; She is aware of risk of infection as well as risk of Aspiration despite a feeding tube from secretions.   -Discussed with GI Dr. Mejia  -q 2 hour turns, HOB 45 degree,   -bronchodilators with Mucomyst to aid in expectoration of secretions  -DVT prophylaxis  -Prognosis guarded  -palliative care consult noted; d/w Dr. Bello. Family opted for any interventions as medically indicated  -Podiatry team d/w daughter likely conservative treatment with antibiotics  Will discuss with ID  - I have also discussed with daughter at length side effects associated with prolonged antibiotic use including but not limited to infections like C. difficile    Discussed with PGY3 Dr. Horn  Discussed with RN and GI as well as Podiatry

## 2018-10-15 NOTE — PROGRESS NOTE ADULT - PROBLEM SELECTOR PLAN 1
-WITH MRSA INFECTION from surgical culture from the foot wound  -MRI showed Findings consistent with osteomyelitis in the distal first metatarsal   neck region.  -Contact isolation  -Podiatry on board recommending partial amputation, family has not given consent for surgery yet. I spoke to the daughter in length at bedside regarding the need for toe amputation and risk and benefit from the surgery. She needs to speak to her rest of the family before making decision, wished to speak to podiatry team in detail. Podiatry team informed about it.  -Continue Vanco and Zosyn as per ID.   -vancomycin trough supratherapuetic today--> dose held and repeat level ordered in AM. Will restart Vanco at reduced dose once level reaches to 15-20.

## 2018-10-15 NOTE — PROGRESS NOTE ADULT - PROBLEM SELECTOR PLAN 3
-strict NPO as pt failed speech and swallow screen.  -IVF hydration till pt gets the PEG tube.  -I discussed with the daughter details about PEG tube, regarding its benefit and risk. Daughter Rafal understands that PEG tube placement does not prevent aspiration event and it just helps pt's nutrition, and it may not extend pt's life.

## 2018-10-16 LAB
ANION GAP SERPL CALC-SCNC: 8 MMOL/L — SIGNIFICANT CHANGE UP (ref 5–17)
APTT BLD: 32.3 SEC — SIGNIFICANT CHANGE UP (ref 27.5–37.4)
BASOPHILS # BLD AUTO: 0 K/UL — SIGNIFICANT CHANGE UP (ref 0–0.2)
BASOPHILS NFR BLD AUTO: 0.5 % — SIGNIFICANT CHANGE UP (ref 0–2)
BUN SERPL-MCNC: 6 MG/DL — LOW (ref 7–18)
CALCIUM SERPL-MCNC: 7.7 MG/DL — LOW (ref 8.4–10.5)
CHLORIDE SERPL-SCNC: 109 MMOL/L — HIGH (ref 96–108)
CO2 SERPL-SCNC: 22 MMOL/L — SIGNIFICANT CHANGE UP (ref 22–31)
CREAT SERPL-MCNC: 0.86 MG/DL — SIGNIFICANT CHANGE UP (ref 0.5–1.3)
CULTURE RESULTS: SIGNIFICANT CHANGE UP
EOSINOPHIL # BLD AUTO: 0.1 K/UL — SIGNIFICANT CHANGE UP (ref 0–0.5)
EOSINOPHIL NFR BLD AUTO: 0.7 % — SIGNIFICANT CHANGE UP (ref 0–6)
GLUCOSE BLDC GLUCOMTR-MCNC: 111 MG/DL — HIGH (ref 70–99)
GLUCOSE BLDC GLUCOMTR-MCNC: 123 MG/DL — HIGH (ref 70–99)
GLUCOSE BLDC GLUCOMTR-MCNC: 136 MG/DL — HIGH (ref 70–99)
GLUCOSE BLDC GLUCOMTR-MCNC: 90 MG/DL — SIGNIFICANT CHANGE UP (ref 70–99)
GLUCOSE SERPL-MCNC: 96 MG/DL — SIGNIFICANT CHANGE UP (ref 70–99)
HCT VFR BLD CALC: 26.3 % — LOW (ref 39–50)
HGB BLD-MCNC: 8.2 G/DL — LOW (ref 13–17)
INR BLD: 1.42 RATIO — HIGH (ref 0.88–1.16)
LYMPHOCYTES # BLD AUTO: 2.4 K/UL — SIGNIFICANT CHANGE UP (ref 1–3.3)
LYMPHOCYTES # BLD AUTO: 25.2 % — SIGNIFICANT CHANGE UP (ref 13–44)
MAGNESIUM SERPL-MCNC: 1.7 MG/DL — SIGNIFICANT CHANGE UP (ref 1.6–2.6)
MCHC RBC-ENTMCNC: 26.9 PG — LOW (ref 27–34)
MCHC RBC-ENTMCNC: 31.3 GM/DL — LOW (ref 32–36)
MCV RBC AUTO: 86 FL — SIGNIFICANT CHANGE UP (ref 80–100)
MONOCYTES # BLD AUTO: 1 K/UL — HIGH (ref 0–0.9)
MONOCYTES NFR BLD AUTO: 10.9 % — SIGNIFICANT CHANGE UP (ref 2–14)
NEUTROPHILS # BLD AUTO: 6 K/UL — SIGNIFICANT CHANGE UP (ref 1.8–7.4)
NEUTROPHILS NFR BLD AUTO: 62.7 % — SIGNIFICANT CHANGE UP (ref 43–77)
ORGANISM # SPEC MICROSCOPIC CNT: SIGNIFICANT CHANGE UP
ORGANISM # SPEC MICROSCOPIC CNT: SIGNIFICANT CHANGE UP
PHOSPHATE SERPL-MCNC: 2.7 MG/DL — SIGNIFICANT CHANGE UP (ref 2.5–4.5)
PLATELET # BLD AUTO: 410 K/UL — HIGH (ref 150–400)
POTASSIUM SERPL-MCNC: 3.8 MMOL/L — SIGNIFICANT CHANGE UP (ref 3.5–5.3)
POTASSIUM SERPL-SCNC: 3.8 MMOL/L — SIGNIFICANT CHANGE UP (ref 3.5–5.3)
PROTHROM AB SERPL-ACNC: 15.6 SEC — HIGH (ref 9.8–12.7)
RBC # BLD: 3.06 M/UL — LOW (ref 4.2–5.8)
RBC # FLD: 14.2 % — SIGNIFICANT CHANGE UP (ref 10.3–14.5)
SODIUM SERPL-SCNC: 139 MMOL/L — SIGNIFICANT CHANGE UP (ref 135–145)
SPECIMEN SOURCE: SIGNIFICANT CHANGE UP
VANCOMYCIN TROUGH SERPL-MCNC: 11.5 UG/ML — SIGNIFICANT CHANGE UP (ref 10–20)
WBC # BLD: 9.5 K/UL — SIGNIFICANT CHANGE UP (ref 3.8–10.5)
WBC # FLD AUTO: 9.5 K/UL — SIGNIFICANT CHANGE UP (ref 3.8–10.5)

## 2018-10-16 PROCEDURE — 43246 EGD PLACE GASTROSTOMY TUBE: CPT

## 2018-10-16 PROCEDURE — 99233 SBSQ HOSP IP/OBS HIGH 50: CPT | Mod: GC

## 2018-10-16 RX ORDER — VANCOMYCIN HCL 1 G
750 VIAL (EA) INTRAVENOUS EVERY 12 HOURS
Qty: 0 | Refills: 0 | Status: DISCONTINUED | OUTPATIENT
Start: 2018-10-16 | End: 2018-10-17

## 2018-10-16 RX ORDER — IRON SUCROSE 20 MG/ML
200 INJECTION, SOLUTION INTRAVENOUS EVERY 24 HOURS
Qty: 0 | Refills: 0 | Status: DISCONTINUED | OUTPATIENT
Start: 2018-10-17 | End: 2018-10-20

## 2018-10-16 RX ADMIN — Medication 3 MILLILITER(S): at 21:06

## 2018-10-16 RX ADMIN — Medication 3 MILLILITER(S): at 08:23

## 2018-10-16 RX ADMIN — Medication 2.5 MILLILITER(S): at 21:08

## 2018-10-16 RX ADMIN — PIPERACILLIN AND TAZOBACTAM 25 GRAM(S): 4; .5 INJECTION, POWDER, LYOPHILIZED, FOR SOLUTION INTRAVENOUS at 18:17

## 2018-10-16 RX ADMIN — Medication 3 MILLILITER(S): at 02:58

## 2018-10-16 RX ADMIN — TAMSULOSIN HYDROCHLORIDE 0.4 MILLIGRAM(S): 0.4 CAPSULE ORAL at 21:21

## 2018-10-16 RX ADMIN — Medication 250 MILLIGRAM(S): at 18:17

## 2018-10-16 RX ADMIN — Medication 250 MILLIGRAM(S): at 11:52

## 2018-10-16 RX ADMIN — Medication 3 MILLILITER(S): at 15:27

## 2018-10-16 RX ADMIN — Medication 2.5 MILLILITER(S): at 02:59

## 2018-10-16 RX ADMIN — Medication 2.5 MILLILITER(S): at 15:28

## 2018-10-16 RX ADMIN — PIPERACILLIN AND TAZOBACTAM 25 GRAM(S): 4; .5 INJECTION, POWDER, LYOPHILIZED, FOR SOLUTION INTRAVENOUS at 11:52

## 2018-10-16 RX ADMIN — Medication 2.5 MILLILITER(S): at 08:24

## 2018-10-16 RX ADMIN — DEXTROSE MONOHYDRATE, SODIUM CHLORIDE, AND POTASSIUM CHLORIDE 75 MILLILITER(S): 50; .745; 4.5 INJECTION, SOLUTION INTRAVENOUS at 18:16

## 2018-10-16 RX ADMIN — PIPERACILLIN AND TAZOBACTAM 25 GRAM(S): 4; .5 INJECTION, POWDER, LYOPHILIZED, FOR SOLUTION INTRAVENOUS at 01:05

## 2018-10-16 NOTE — PROGRESS NOTE ADULT - SUBJECTIVE AND OBJECTIVE BOX
Patient is a 86y old  Male who presents with a chief complaint of Rt foot ulcer (16 Oct 2018 10:13)      INTERVAL HPI/OVERNIGHT EVENTS: Patient was seen and examined at the bedside, nephew was there. I called Ms. Bruno in AM ( [phone] cell#) and she told me that she wants to proceed with PEG tube insertion for him. PEG tube insertion is scheduled by Dr. Mejia at 12:30 pm today.  Pt was noted to have less rhonchi as compared to yesterday. Looks comfortable in the bed.   Vancotrough in AM was 11, so restarted dose at reduced dose; 750mg IV BID. Will check vancotrough again tomorrow at 17:00.    T(C): 36.6 (10-16-18 @ 05:15), Max: 37.4 (10-15-18 @ 14:23)  HR: 84 (10-16-18 @ 05:15) (84 - 91)  BP: 125/62 (10-16-18 @ 05:15) (117/42 - 125/62)  RR: 18 (10-16-18 @ 05:15) (18 - 18)  SpO2: 99% (10-16-18 @ 05:15) (97% - 99%)  Wt(kg): --  I&O's Summary      LABS:                        8.2    9.5   )-----------( 410      ( 16 Oct 2018 06:43 )             26.3     10-16    139  |  109<H>  |  6<L>  ----------------------------<  96  3.8   |  22  |  0.86    Ca    7.7<L>      16 Oct 2018 06:43  Phos  2.7     10-16  Mg     1.7     10-16    TPro  6.3  /  Alb  1.9<L>  /  TBili  0.6  /  DBili  0.2  /  AST  28  /  ALT  39  /  AlkPhos  58  10-15    PT/INR - ( 16 Oct 2018 06:43 )   PT: 15.6 sec;   INR: 1.42 ratio         PTT - ( 16 Oct 2018 06:43 )  PTT:32.3 sec    CAPILLARY BLOOD GLUCOSE      POCT Blood Glucose.: 90 mg/dL (16 Oct 2018 06:46)  POCT Blood Glucose.: 136 mg/dL (16 Oct 2018 00:33)  POCT Blood Glucose.: 120 mg/dL (15 Oct 2018 16:30)    LIVER FUNCTIONS - ( 15 Oct 2018 10:55 )  Alb: 1.9 g/dL / Pro: 6.3 g/dL / ALK PHOS: 58 U/L / ALT: 39 U/L DA / AST: 28 U/L / GGT: x                   MEDICATIONS  (STANDING):  acetylcysteine 10% Inhalation 2.5 milliLiter(s) Inhalation every 6 hours  ALBUTerol/ipratropium for Nebulization 3 milliLiter(s) Nebulizer every 6 hours  aspirin enteric coated 81 milliGRAM(s) Oral daily  dextrose 5% + sodium chloride 0.9% with potassium chloride 20 mEq/L 1000 milliLiter(s) (75 mL/Hr) IV Continuous <Continuous>  enoxaparin Injectable 30 milliGRAM(s) SubCutaneous daily  finasteride 5 milliGRAM(s) Oral daily  influenza   Vaccine 0.5 milliLiter(s) IntraMuscular once  piperacillin/tazobactam IVPB. 3.375 Gram(s) IV Intermittent every 8 hours  tamsulosin 0.4 milliGRAM(s) Oral at bedtime  vancomycin  IVPB 750 milliGRAM(s) IV Intermittent every 12 hours      Consultant(s) Notes Reviewed:  [ x] YES  [ ] NO    REVIEW OF SYSTEMS: Limited as patient is nonverbal and bedbound.  CONSTITUTIONAL: No fever  EYES: No discharge  NECK: No stiffness  RESPIRATORY: + sputum, improved  CARDIOVASCULAR: No palpitations  GASTROINTESTINAL: No nausea, vomiting, or hematemesis; No diarrhea or constipation.  NEUROLOGICAL: No tremors  MUSCULOSKELETAL: right foot ulcer    PHYSICAL EXAM:  GENERAL: NAD, cachectic  HEAD:  Atraumatic, Normocephalic  EYES: PERRLA, conjunctiva and sclera clear  NECK: Supple, No JVD, Normal thyroid  NERVOUS SYSTEM:  demented  CHEST/LUNG: rhonchi B/L improved, no rales, no wheezing, or rubs  HEART: Regular rate and rhythm; No murmurs, rubs, or gallops  ABDOMEN: Soft, Nontender, Nondistended; Bowel sounds present  EXTREMITIES:  Right foot ulcers  LYMPH: No lymphadenopathy noted  SKIN: right foot ulcers      Care Discussed with Consultants/Other Providers [x ] YES  [ ] NO

## 2018-10-16 NOTE — PROGRESS NOTE ADULT - PROBLEM SELECTOR PLAN 2
continue with zosyn  aspiration precautions  HOB 45 degrees  Pt needs PEG tube placement by Dr. Mejia, for which family is actively considering. continue with zosyn  aspiration precautions  HOB 45 degrees  Pt will get PEG tube placement by Dr. Mejia today.

## 2018-10-16 NOTE — PROGRESS NOTE ADULT - PROBLEM SELECTOR PLAN 5
not on any medications   bedbound - non verbal, non mobile for about 14 years  Palliative care consulted, patient is full code not on any medications   bedbound - non verbal, non mobile for about 14 years  Palliative input appreciated, patient is full code and family wants PEG tube insertion which will be done today.

## 2018-10-16 NOTE — PROGRESS NOTE ADULT - SUBJECTIVE AND OBJECTIVE BOX
Patient is a 86y old  Male who presents with a chief complaint of right foot ulcers    HPI: Per Family 86 year old M Pt w/ PMHx of BPH, HTN, and Advanced Dementia presents to ED c/o right foot ulceration x 2 weeks. Pt visited podiatrist yesterday for a worsening right foot ulceration, located by his big toe. Pt was referred to ED for evaluation of possible osteomyelitis yesterday, but was unable to come to ED yesterday as it was not possible to arrange an ambulance.  NKDA  Pt seen bedside by podiatry. Pt unable to communicate, pt in NAD.     PMH:HTN (hypertension)  Advanced dementia  BPH (benign prostatic hyperplasia)    Allergies: No Known Allergies    Medications:   FH:  PSX: No significant past surgical history    SH:     ICU Vital Signs Last 24 Hrs  T(C): 36.6 (16 Oct 2018 05:15), Max: 37.4 (15 Oct 2018 14:23)  T(F): 97.9 (16 Oct 2018 05:15), Max: 99.3 (15 Oct 2018 14:23)  HR: 84 (16 Oct 2018 05:15) (84 - 91)  BP: 125/62 (16 Oct 2018 05:15) (117/42 - 125/62)  BP(mean): --  ABP: --  ABP(mean): --  RR: 18 (16 Oct 2018 05:15) (18 - 18)  SpO2: 99% (16 Oct 2018 05:15) (97% - 99%)        LABS                        8.2    9.5   )-----------( 410      ( 16 Oct 2018 06:43 )             26.3   10-16    139  |  109<H>  |  6<L>  ----------------------------<  96  3.8   |  22  |  0.86    Ca    7.7<L>      16 Oct 2018 06:43  Phos  2.7     10-16  Mg     1.7     10-16    TPro  6.3  /  Alb  1.9<L>  /  TBili  0.6  /  DBili  0.2  /  AST  28  /  ALT  39  /  AlkPhos  58  10-15          PHYSICAL EXAM  GEN: PERLA HER is a pleasant well-nourished, well developed 86y Male in no acute distress, alert awake, and oriented to person, place and time.   LE Focused: right foot focused   Vasc:  DP/PT palpable, CFT < 3 secs x 5, no edema, TG warm to cool  Derm: ulcers: medial aspect of 1st MPJ, dorsal foot, lateral ankle, no drainage, -PTB to all, no malodor, fibrogranular base, normal borders  Neuro: unable to perform      Imaging: < from: Xray Foot AP + Lateral + Oblique, Right (10.10.18 @ 15:27) >  PROCEDURE DATE:  10/10/2018          INTERPRETATION:  CLINICAL STATEMENT: Pain. Evaluate for osteomyelitis   first toe    TECHNIQUE: AP, lateral and oblique views of right foot    COMPARISON: None.    FINDINGS:  Diffuse soft tissue swelling first digit. Flexion of the phalanges   limited evaluation of the phalanges.. Diffuse osteopenia. Difficult to   evaluate first interphalangeal joint.    Lucency at the head of the first metatarsal which may represent   osteomyelitis in the correct clinical setting. Correlate clinically.    Otherwise, no acute fracture        IMPRESSION:  Findings as described above.     < end of copied text >    ------------------------------------------------------    < from: MR Foot No Cont, Right (10.12.18 @ 15:02) >  PROCEDURE DATE:  10/12/2018          INTERPRETATION:  MRI OF THE RIGHT FOOT:    CLINICAL INDICATION: Right osteomyelitis, right foot.    TECHNIQUE: Multiplanar, muliti-sequential MRI of the right foot was   performed without contrast. The study is degraded by motion artifact.    FINDINGS:    Motion degraded images demonstrate diffuse bone marrow edema pattern and   associated low T1 signal alteration in the first metatarsal head and   first metatarsal neck region consistent with osteomyelitis. Findings   compatible with osteomyelitis is also suspected in the medial hallux   sesamoid which is diffusely low signal on the T1-weighted sequences and   markedly hyperintense on fluid sensitive sequences.  Mild bone marrow   edema pattern is also noted at the base of the first proximal phalanx   with associated subtle low T1 signal alteration.  Diffuse bone marrow   edema pattern and subtle low T1 signal alteration also noted in the   lateral hallux sesamoid, also suggestive of osteomyelitis. There is   surrounding soft tissue swelling compatible with cellulitis. There is no   drainable fluid collection or abscess. There is suggestion of skin   ulceration along the medial aspect of the first metatarsal head. The   remaining osseous structures demonstrate normal signal.     IMPRESSION:    Motion degraded study.    Findings consistent with osteomyelitis in the distal first metatarsal   neck region, throughout the first metatarsal head and throughout the   medial hallux sesamoid. Osteomyelitis is also suspected at the base of   the first proximal phalanx as well as within the lateral hallux sesamoid.   There is surrounding cellulitis. There is no drainable fluid collection   or abscess.    < end of copied text >  -------------------------------------------------------------    Culture - Surgical Swab (10.11.18 @ 04:04)    Specimen Source: .Surgical Swab right foot ulcer    Culture Results:   Numerous Staphylococcus aureus      Cultures: preliminary    A:   right foot ulcers  Osteomyelitis    P:  pt evaluated and chart reviewed  culture - preliminary results above  dressing changed, DSD  xrays reviewed see above  MRI results see above  Dr. Almeida present, previously discussed with family results of MRI and treatment options and choosing IV antibiotics due to risks of surgery outweighing benefits of surgery. Pt's granddaughter understands and agrees  No plan for surgical intervention at this time  IV antibiotics as per ID  pt stable from podiatric standpoint  Podiatry to follow while in house

## 2018-10-16 NOTE — PROGRESS NOTE ADULT - ATTENDING COMMENTS
Patient seen and examined this morning with daughter and nephew at bedside; Agree with PGY3  A/P above with editing as needed. Discussed with Dr. Horn Patient seen and examined this morning with daughter and nephew at bedside; Agree with PGY3  A/P above with editing as needed. Discussed with Dr. Horn    Patient is doing clinically same, in  no acute distress. bedbound, non verbal status, unable to offer complaints.     Vital Signs Last 24 Hrs  T(C): 36.2 (16 Oct 2018 20:38), Max: 37.2 (16 Oct 2018 15:10)  T(F): 97.2 (16 Oct 2018 20:38), Max: 98.9 (16 Oct 2018 15:10)  HR: 84 (16 Oct 2018 20:38) (84 - 96)  BP: 106/49 (16 Oct 2018 20:38) (106/49 - 141/63)  RR: 18 (16 Oct 2018 20:38) (18 - 18)  SpO2: 99% (16 Oct 2018 20:38) (98% - 99%)    P/E:  Neuro: Limited  CVS: S1S2 present, Regular  Resp: BLAE+, No wheeze; Coarse Breath sounds Left hemithorax less pronounced today  GI: Soft, BS+, NT, ND  Extr; Contracted with no edema or calf tenderness and toe ulcers (s/p dressing by Podiatry)     Labs:                                  8.2    9.5   )-----------( 410      ( 16 Oct 2018 06:43 )             26.3   10-16    139  |  109<H>  |  6<L>  ----------------------------<  96  3.8   |  22  |  0.86    Ca    7.7<L>      16 Oct 2018 06:43  Phos  2.7     10-16  Mg     1.7     10-16    TPro  6.3  /  Alb  1.9<L>  /  TBili  0.6  /  DBili  0.2  /  AST  28  /  ALT  39  /  AlkPhos  58  10-15    D/D:  1. Sepsis due to  2. cellulitis infected ulcer of right foot with osteomyelitis of right great toe  3. with RLL consolidation probably Aspiration Pneumonia  4. enterovirus infection/bronchitis  5. severe protein calorie malnutrition  6. advanced alzheimer's dementia bedbound status s/p CVA  7. dysphagia  8. debility    Plan:  -continue with zosyn and vancomycin; ID Follow up appreciated; Resumed Vanco adjusted dose  -Repeat trough prior to 3rd dose   -As per ID considering treating with Antibiotics; will follow yp with daughter  -Discussed with daughter this morning agreed with PEG and consented  -Discussed with GI Dr. Mejia this morning s/p Peg today; May use for meds today; Start fees tomorrow morning  =Discussed with daughter possible Rehab, may consider  -q 2 hour turns, HOB 45 degree,   -bronchodilators with Mucomyst to aid in expectoration of secretions; will D/C Mucomyst in AM if no significant upper airway secretions.   -DVT prophylaxis  -Prognosis is  poor and guarded; Family wants FULL CODE;   -Podiatry follow up  -Will give a lab holiday tomorrow.     Discussed with  Manager Sonja Starr to follow up with daughter for Rehab choices if interested as patient is expected to be medically stable in the next 48 hrs  If daughter not interested, will need family to learn tube feeds.     Discussed with PGY3 Dr. Horn  Discussed with RN Patient seen and examined this morning with daughter and nephew at bedside; Agree with PGY3  A/P above with editing as needed. Discussed with Dr. Horn    Patient is doing clinically same, in  no acute distress. bedbound, non verbal status, unable to offer complaints.     Vital Signs Last 24 Hrs  T(C): 36.2 (16 Oct 2018 20:38), Max: 37.2 (16 Oct 2018 15:10)  T(F): 97.2 (16 Oct 2018 20:38), Max: 98.9 (16 Oct 2018 15:10)  HR: 84 (16 Oct 2018 20:38) (84 - 96)  BP: 106/49 (16 Oct 2018 20:38) (106/49 - 141/63)  RR: 18 (16 Oct 2018 20:38) (18 - 18)  SpO2: 99% (16 Oct 2018 20:38) (98% - 99%)    P/E:  Neuro: Limited  CVS: S1S2 present, Regular  Resp: BLAE+, No wheeze; Coarse Breath sounds Left hemithorax less pronounced today  GI: Soft, BS+, NT, ND  Extr; Contracted with no edema or calf tenderness and toe ulcers (s/p dressing by Podiatry)     Labs:                                  8.2    9.5   )-----------( 410      ( 16 Oct 2018 06:43 )             26.3   10-16    139  |  109<H>  |  6<L>  ----------------------------<  96  3.8   |  22  |  0.86    Ca    7.7<L>      16 Oct 2018 06:43  Phos  2.7     10-16  Mg     1.7     10-16    TPro  6.3  /  Alb  1.9<L>  /  TBili  0.6  /  DBili  0.2  /  AST  28  /  ALT  39  /  AlkPhos  58  10-15    D/D:  1. Sepsis due to  2. cellulitis infected ulcer of right foot with osteomyelitis of right great toe  3. with RLL consolidation probably Aspiration Pneumonia  4. enterovirus infection/bronchitis  5. severe protein calorie malnutrition  6. advanced alzheimer's dementia bedbound status s/p CVA  7. dysphagia  8. debility    Plan:  -continue with zosyn and vancomycin; ID Follow up appreciated; Resumed Vanco adjusted dose  -Repeat trough prior to 3rd dose   -As per ID considering treating with Antibiotics; will follow yp with daughter  -Discussed with daughter this morning agreed with PEG and consented  -Discussed with GI Dr. Mejia this morning s/p Peg today; May use for meds today; Start fees tomorrow morning  =Discussed with daughter possible Rehab, may consider  -q 2 hour turns, HOB 45 degree,   -bronchodilators with Mucomyst to aid in expectoration of secretions; will D/C Mucomyst in AM if no significant upper airway secretions.   -DVT prophylaxis  -Prognosis is  poor and guarded; Family wants FULL CODE;   -Podiatry follow up  -Will give a lab holiday tomorrow.   VANCO TROUGH 5 PM TOMORROW    Discussed with  Manager Sonja Starr to follow up with daughter for Rehab choices if interested as patient is expected to be medically stable in the next 48 hrs  If daughter not interested, will need family to learn tube feeds.     Discussed with PGY3 Dr. Horn  Discussed with RN Patient seen and examined this morning with daughter and nephew at bedside; Agree with PGY3  A/P above with editing as needed. Discussed with Dr. Horn    Patient is doing clinically same, in  no acute distress. bedbound, non verbal status, unable to offer complaints.     Vital Signs Last 24 Hrs  T(C): 36.2 (16 Oct 2018 20:38), Max: 37.2 (16 Oct 2018 15:10)  T(F): 97.2 (16 Oct 2018 20:38), Max: 98.9 (16 Oct 2018 15:10)  HR: 84 (16 Oct 2018 20:38) (84 - 96)  BP: 106/49 (16 Oct 2018 20:38) (106/49 - 141/63)  RR: 18 (16 Oct 2018 20:38) (18 - 18)  SpO2: 99% (16 Oct 2018 20:38) (98% - 99%)    P/E:  Neuro: Limited  CVS: S1S2 present, Regular  Resp: BLAE+, No wheeze; Coarse Breath sounds Left hemithorax less pronounced today  GI: Soft, BS+, NT, ND  Extr; Contracted with no edema or calf tenderness and toe ulcers (s/p dressing by Podiatry)     Labs:                                  8.2    9.5   )-----------( 410      ( 16 Oct 2018 06:43 )             26.3   10-16    139  |  109<H>  |  6<L>  ----------------------------<  96  3.8   |  22  |  0.86    Ca    7.7<L>      16 Oct 2018 06:43  Phos  2.7     10-16  Mg     1.7     10-16    TPro  6.3  /  Alb  1.9<L>  /  TBili  0.6  /  DBili  0.2  /  AST  28  /  ALT  39  /  AlkPhos  58  10-15    D/D:  1. Sepsis due to  2. cellulitis infected ulcer of right foot with osteomyelitis of right great toe  3. with RLL consolidation probably Aspiration Pneumonia  4. enterovirus infection/bronchitis  5. severe protein calorie malnutrition  6. Anemia Iron deficient  7. advanced alzheimer's dementia bedbound status s/p CVA  8. dysphagia  9. debility    Plan:  -continue with zosyn and vancomycin; ID Follow up appreciated; Resumed Vanco adjusted dose  -Repeat trough prior to 3rd dose   -As per ID considering treating with Antibiotics; will follow yp with daughter  -Discussed with daughter this morning agreed with PEG and consented  -Discussed with GI Dr. Mejia this morning s/p Peg today; May use for meds today; Start fees tomorrow morning  -Add Venofer x 3 days as Iron studies c/w Iron deficiency  =Discussed with daughter possible Rehab, may consider  -q 2 hour turns, HOB 45 degree,   -bronchodilators with Mucomyst to aid in expectoration of secretions; will D/C Mucomyst in AM if no significant upper airway secretions.   -DVT prophylaxis  -Prognosis is  poor and guarded; Family wants FULL CODE;   -Podiatry follow up  -Will give a lab holiday tomorrow.   VANCO TROUGH 5 PM TOMORROW    Discussed with  Manager Sonja Starr to follow up with daughter for Rehab choices if interested as patient is expected to be medically stable in the next 48 hrs  If daughter not interested, will need family to learn tube feeds.     Discussed with PGY3 Dr. Horn  Discussed with RN

## 2018-10-16 NOTE — PROGRESS NOTE ADULT - ASSESSMENT
1.	Right 1st toe acute osteomyelitis with MRSA  2.	Aspiration pneumonia   3.	Leukocytosis - normalized  ·	cont zosyn 3.375gm IV to q8h D7  ·	vanco was changed to 750mgs iv q12 hrs as trough was low  ·	daughter was not present at this time but my inclination is to treat him with antibiotics via peg likely clindamycin as I don't think he is going to do well either way.

## 2018-10-16 NOTE — MEDICAL STUDENT PROGRESS NOTE(EDUCATION) - NS MD HP STUD ASPLAN ASSES FT
85yo M with pmh of BPH, HTN, with advanced Alzheimer's (no medications, non verbal, bed bound at home), and legally blind presents to the ED with R foot ulcer infection. Pt was seen by podiatrist (weekly evaluation) and noted progressively worsening infection of R big toe ulcer and was brought to hospital for further work up. Patient is admitted for R big toe osteomyelitis.

## 2018-10-16 NOTE — PROGRESS NOTE ADULT - SUBJECTIVE AND OBJECTIVE BOX
86y Male who is s/p peg today, he is non verbal and is unable to offer any complaints, he has an osteomyelitis of his right 1st toe but is unlikely to improve even with antibiotic therapy whether it be IV or oral given his overall condition. He has no fevers.      Meds:  piperacillin/tazobactam IVPB. 3.375 Gram(s) IV Intermittent every 8 hours  vancomycin  IVPB 750 milliGRAM(s) IV Intermittent every 12 hours    Allergies    No Known Allergies    Intolerances        VITALS:  Vital Signs Last 24 Hrs  T(C): 37.2 (16 Oct 2018 15:10), Max: 37.2 (16 Oct 2018 15:10)  T(F): 98.9 (16 Oct 2018 15:10), Max: 98.9 (16 Oct 2018 15:10)  HR: 96 (16 Oct 2018 15:10) (84 - 96)  BP: 141/63 (16 Oct 2018 15:10) (117/42 - 141/63)  BP(mean): --  RR: 18 (16 Oct 2018 15:10) (18 - 18)  SpO2: 98% (16 Oct 2018 15:10) (97% - 99%)    LABS/DIAGNOSTIC TESTS:                          8.2    9.5   )-----------( 410      ( 16 Oct 2018 06:43 )             26.3   Vancomycin Level, Trough (10.16.18 @ 06:43)    Vancomycin Level, Trough: 11.5           10-16    139  |  109<H>  |  6<L>  ----------------------------<  96  3.8   |  22  |  0.86    Ca    7.7<L>      16 Oct 2018 06:43  Phos  2.7     10-16  Mg     1.7     10-16    TPro  6.3  /  Alb  1.9<L>  /  TBili  0.6  /  DBili  0.2  /  AST  28  /  ALT  39  /  AlkPhos  58  10-15      LIVER FUNCTIONS - ( 15 Oct 2018 10:55 )  Alb: 1.9 g/dL / Pro: 6.3 g/dL / ALK PHOS: 58 U/L / ALT: 39 U/L DA / AST: 28 U/L / GGT: x             CULTURES: .Surgical Swab right foot ulcer  10-11 @ 04:04   Numerous Methicillin resistant Staphylococcus aureus  --  Methicillin resistant Staphylococcus aureus      .Urine Catheterized  10-10 @ 23:57   No growth  --  --      .Blood Blood-Peripheral  10-10 @ 18:23   No growth at 5 days.  --  --            RADIOLOGY:      ROS:  [ x ] UNABLE TO ELICIT

## 2018-10-16 NOTE — PROGRESS NOTE ADULT - PROBLEM SELECTOR PLAN 4
Continue lisinopril with parameters, BP at acceptable range  Monitor BP BP in acceptable range without any PO meds currently.  Monitor BP

## 2018-10-16 NOTE — MEDICAL STUDENT PROGRESS NOTE(EDUCATION) - SUBJECTIVE AND OBJECTIVE BOX
85yo M with pmh of BPH, HTN, with advanced dementia (no medications, non verbal, bed bound at home), and legally blind presents to the ED with R foot ulcer infection. Pt was seen by podiatrist (weekly evaluation) and noted progressively worsening infection of R big toe ulcer and was brought to hospital for further work up.      10/16/18 - Patient is seen comfortably in bed with decreased rhonchi and wheezes but in no acute distress on observation. Accompanied by his nephew, who states that his R hand swelling has improved from infiltrated IV and patient continues to cough and produce clear sputum upon suction. Per nephew, patient family decided on getting the PEG tube today. Denies any fever, chest pain, extremity swelling.    T. 97.9 HR 84 /62 RR 18 SpO2 99%    Labs.  WBC 9.2, Hb/HCt 8.2/26.3, Plt 410    Na 139, K 3.8, Cl 109, HCO3 22, BUN 6, Cr 0.86, Glc 96    Vanc 11.5

## 2018-10-16 NOTE — PROGRESS NOTE ADULT - PROBLEM SELECTOR PLAN 1
-WITH MRSA INFECTION from surgical culture from the foot wound, being treated with IV antibiotics; zosyn and vanco.  -MRI showed Findings consistent with osteomyelitis in the distal first metatarsal   neck region. As per Podiatry team, risk of surgery outweighs benefit so no surgical intervention is recommended. As this is bone infection with resistant organism, pt will likely need 6 weeks of IV antibiotics and in that case he will need PICC line insertion upon discharge. Will f/u with ID.   -Continue contact isolation  -Continue Vanco and Zosyn as per ID.   -vancomycin trough supratherapuetic today--> dose held and repeat level ordered in AM. Will restart Vanco at reduced dose once level reaches to 15-20. -WITH MRSA INFECTION from surgical culture from the foot wound, being treated with IV antibiotics; zosyn and vanco.  -MRI showed Findings consistent with osteomyelitis in the distal first metatarsal   neck region. As per Podiatry team, risk of surgery outweighs benefit so no surgical intervention is recommended. As this is bone infection with resistant organism, pt will likely need 6 weeks of IV antibiotics and in that case he will need PICC line insertion upon discharge. Will f/u with ID.   -Continue contact isolation  -Continue Vanco and Zosyn while inpatient.  -vancomycin trough was in desirable range, so restarted vanco at reduced dose, 750mg BID. Will recheck the trough tomorrow at 17:00, before the 4th dose.

## 2018-10-16 NOTE — PROGRESS NOTE ADULT - PROBLEM SELECTOR PLAN 6
IMPROVE VTE Individual Risk Assessment          RISK                                                          Points  [  ] Previous VTE                                                3  [  ] Thrombophilia                                             2  [  ] Lower limb paralysis                                   2        (unable to hold up >15 seconds)    [  ] Current Cancer                                             2         (within 6 months)  [ x ] Immobilization > 24 hrs                              1  [  ] ICU/CCU stay > 24 hours                             1  [ x ] Age > 60                                                         1    IMPROVE VTE Score: 2  continue with lovenox IMPROVE VTE Individual Risk Assessment          RISK                                                          Points  [  ] Previous VTE                                                3  [  ] Thrombophilia                                             2  [  ] Lower limb paralysis                                   2        (unable to hold up >15 seconds)    [  ] Current Cancer                                             2         (within 6 months)  [ x ] Immobilization > 24 hrs                              1  [  ] ICU/CCU stay > 24 hours                             1  [ x ] Age > 60                                                         1    IMPROVE VTE Score: 2  continue with lovenox daily subcu

## 2018-10-16 NOTE — CHART NOTE - NSCHARTNOTEFT_GEN_A_CORE
Reassessment: S/p PEG placement in am today  86yMalePatient is a 86y old  Male who presents with a chief complaint of Rt foot ulcer (16 Oct 2018 12:18)      Factors impacting intake: [ ] none [ ] nausea  [ ] vomiting [ ] diarrhea [ ] constipation  [ ]chewing problems [ ] swallowing issues  [X ] other: FTT    Diet Presciption: Diet, NPO (10-11-18 @ 11:09)    Intake: Pt. family agreed to PEG placement & done today, followed by GI/team, rec. Goal: Jevity 1.5 @ 60ml/h x 16h (960ml, 1440kcal, 61g protein, 729ml water daily at goal), MD to adjust free water as needed, as medically feasible. RD available.      Daily Weight in k (13 Oct 2018 06:03)  Weight in k.3 (12 Oct 2018 13:00)  Weight in k.3 (12 Oct 2018 13:00)  Weight in k.7 (11 Oct 2018 04:48)    % Weight Change: wt. variation noted     Pertinent Medications: MEDICATIONS  (STANDING):  acetylcysteine 10% Inhalation 2.5 milliLiter(s) Inhalation every 6 hours  ALBUTerol/ipratropium for Nebulization 3 milliLiter(s) Nebulizer every 6 hours  aspirin enteric coated 81 milliGRAM(s) Oral daily  dextrose 5% + sodium chloride 0.9% with potassium chloride 20 mEq/L 1000 milliLiter(s) (75 mL/Hr) IV Continuous <Continuous>  enoxaparin Injectable 30 milliGRAM(s) SubCutaneous daily  finasteride 5 milliGRAM(s) Oral daily  influenza   Vaccine 0.5 milliLiter(s) IntraMuscular once  piperacillin/tazobactam IVPB. 3.375 Gram(s) IV Intermittent every 8 hours  tamsulosin 0.4 milliGRAM(s) Oral at bedtime  vancomycin  IVPB 750 milliGRAM(s) IV Intermittent every 12 hours    MEDICATIONS  (PRN):    Pertinent Labs: 10- Na139 mmol/L Glu 96 mg/dL K+ 3.8 mmol/L Cr  0.86 mg/dL BUN 6 mg/dL<L> 10-16 Phos 2.7 mg/dL 10-15 Alb 1.9 g/dL<L> 10- [id number] 5.5 % 10-11 Chol 88 mg/dL LDL 66 mg/dL HDL 10 mg/dL<L> Trig 60 mg/dL     CAPILLARY BLOOD GLUCOSE      POCT Blood Glucose.: 90 mg/dL (16 Oct 2018 06:46)  POCT Blood Glucose.: 136 mg/dL (16 Oct 2018 00:33)  POCT Blood Glucose.: 120 mg/dL (15 Oct 2018 16:30)    Skin: multiple pressure ulcer with wound care    Estimated Needs:   [X ] no change since previous assessment  [ ] recalculated:     Previous Nutrition Diagnosis:   [ ] Inadequate Energy Intake [ ]Inadequate Oral Intake [ ] Excessive Energy Intake   [ ] Underweight [ ] Increased Nutrient Needs [ ] Overweight/Obesity   [ ] Altered GI Function [ ] Unintended Weight Loss [ ] Food & Nutrition Related Knowledge Deficit [Severe] Malnutrition     Nutrition Diagnosis is [X ] ongoing  [ ] resolved [ ] not applicable     New Nutrition Diagnosis: [ ] not applicable       Interventions: To meet nutrition needs via tube feeding   Recommend  [ ] Change Diet To:  [ ] Nutrition Supplement  [X ] Nutrition Support  [ ] Other:     Monitoring and Evaluation:   [ ] PO intake [ x ] Tolerance to diet prescription [ x ] weights [ x ] labs[ x ] follow up per protocol  [X ] other: adequacy & tolerance of TF Reassessment: S/p PEG placement in am today  86yMalePatient is a 86y old  Male who presents with a chief complaint of Rt foot ulcer (16 Oct 2018 12:18)      Factors impacting intake: [ ] none [ ] nausea  [ ] vomiting [ ] diarrhea [ ] constipation  [ ]chewing problems [ ] swallowing issues  [X ] other: FTT    Diet Presciption: Diet, NPO (10-11-18 @ 11:09)    Intake: Pt. family agreed to PEG placement & done today, followed by GI/team, rec. Goal: Jevity 1.5 @ 60ml/h x 16h (960ml, 1440kcal, 61g protein, 729ml water daily at goal), MD to adjust free water as needed, as medically feasible & per , request for bolus tube feeding recommendation RD available.      Daily Weight in k (13 Oct 2018 06:03)  Weight in k.3 (12 Oct 2018 13:00)  Weight in k.3 (12 Oct 2018 13:00)  Weight in k.7 (11 Oct 2018 04:48)    % Weight Change: wt. variation noted     Pertinent Medications: MEDICATIONS  (STANDING):  acetylcysteine 10% Inhalation 2.5 milliLiter(s) Inhalation every 6 hours  ALBUTerol/ipratropium for Nebulization 3 milliLiter(s) Nebulizer every 6 hours  aspirin enteric coated 81 milliGRAM(s) Oral daily  dextrose 5% + sodium chloride 0.9% with potassium chloride 20 mEq/L 1000 milliLiter(s) (75 mL/Hr) IV Continuous <Continuous>  enoxaparin Injectable 30 milliGRAM(s) SubCutaneous daily  finasteride 5 milliGRAM(s) Oral daily  influenza   Vaccine 0.5 milliLiter(s) IntraMuscular once  piperacillin/tazobactam IVPB. 3.375 Gram(s) IV Intermittent every 8 hours  tamsulosin 0.4 milliGRAM(s) Oral at bedtime  vancomycin  IVPB 750 milliGRAM(s) IV Intermittent every 12 hours    MEDICATIONS  (PRN):    Pertinent Labs: 10-16 Na139 mmol/L Glu 96 mg/dL K+ 3.8 mmol/L Cr  0.86 mg/dL BUN 6 mg/dL<L> 10-16 Phos 2.7 mg/dL 10-15 Alb 1.9 g/dL<L> 10-11 [id number] 5.5 % 10-11 Chol 88 mg/dL LDL 66 mg/dL HDL 10 mg/dL<L> Trig 60 mg/dL     CAPILLARY BLOOD GLUCOSE      POCT Blood Glucose.: 90 mg/dL (16 Oct 2018 06:46)  POCT Blood Glucose.: 136 mg/dL (16 Oct 2018 00:33)  POCT Blood Glucose.: 120 mg/dL (15 Oct 2018 16:30)    Skin: multiple pressure ulcer with wound care    Estimated Needs:   [X ] no change since previous assessment  [ ] recalculated:     Previous Nutrition Diagnosis:   [ ] Inadequate Energy Intake [ ]Inadequate Oral Intake [ ] Excessive Energy Intake   [ ] Underweight [ ] Increased Nutrient Needs [ ] Overweight/Obesity   [ ] Altered GI Function [ ] Unintended Weight Loss [ ] Food & Nutrition Related Knowledge Deficit [Severe] Malnutrition     Nutrition Diagnosis is [X ] ongoing  [ ] resolved [ ] not applicable     New Nutrition Diagnosis: [ ] not applicable       Interventions: To meet nutrition needs via tube feeding   Recommend  [ ] Change Diet To:  [ ] Nutrition Supplement  [X ] Nutrition Support  [ ] Other:     Monitoring and Evaluation:   [ ] PO intake [ x ] Tolerance to diet prescription [ x ] weights [ x ] labs[ x ] follow up per protocol  [X ] other: adequacy & tolerance of TF Reassessment: S/p PEG placement in am today  86yMalePatient is a 86y old  Male who presents with a chief complaint of Rt foot ulcer (16 Oct 2018 12:18)      Factors impacting intake: [ ] none [ ] nausea  [ ] vomiting [ ] diarrhea [ ] constipation  [ ]chewing problems [ ] swallowing issues  [X ] other: FTT    Diet Presciption: Diet, NPO (10-11-18 @ 11:09)    Intake: Pt. family agreed to PEG placement & done today, followed by GI/team, rec. Goal: Jevity 1.5 @ 60ml/h x 16h (960ml, 1440kcal, 61g protein, 729ml water daily at goal), MD to adjust free water as needed, as medically feasible & per , request for bolus tube feeding once pt. d/c to home, recommend TF - Jevity 1.5 Kcal 1 can 8oz, 4 times per day (960 ml, 1440 Kcal, Protein, 61 grams, 729 ml free water) 30ml water before &after each bolus feeding, 120 ml free water flush 4 times per day, as medically feasible. RD available.      Daily Weight in k (13 Oct 2018 06:03)  Weight in k.3 (12 Oct 2018 13:00)  Weight in k.3 (12 Oct 2018 13:00)  Weight in k.7 (11 Oct 2018 04:48)    % Weight Change: wt. variation noted     Pertinent Medications: MEDICATIONS  (STANDING):  acetylcysteine 10% Inhalation 2.5 milliLiter(s) Inhalation every 6 hours  ALBUTerol/ipratropium for Nebulization 3 milliLiter(s) Nebulizer every 6 hours  aspirin enteric coated 81 milliGRAM(s) Oral daily  dextrose 5% + sodium chloride 0.9% with potassium chloride 20 mEq/L 1000 milliLiter(s) (75 mL/Hr) IV Continuous <Continuous>  enoxaparin Injectable 30 milliGRAM(s) SubCutaneous daily  finasteride 5 milliGRAM(s) Oral daily  influenza   Vaccine 0.5 milliLiter(s) IntraMuscular once  piperacillin/tazobactam IVPB. 3.375 Gram(s) IV Intermittent every 8 hours  tamsulosin 0.4 milliGRAM(s) Oral at bedtime  vancomycin  IVPB 750 milliGRAM(s) IV Intermittent every 12 hours    MEDICATIONS  (PRN):    Pertinent Labs: 10- Na139 mmol/L Glu 96 mg/dL K+ 3.8 mmol/L Cr  0.86 mg/dL BUN 6 mg/dL<L> 10-16 Phos 2.7 mg/dL 10-15 Alb 1.9 g/dL<L> 10-11 [id number] 5.5 % 10- Chol 88 mg/dL LDL 66 mg/dL HDL 10 mg/dL<L> Trig 60 mg/dL     CAPILLARY BLOOD GLUCOSE      POCT Blood Glucose.: 90 mg/dL (16 Oct 2018 06:46)  POCT Blood Glucose.: 136 mg/dL (16 Oct 2018 00:33)  POCT Blood Glucose.: 120 mg/dL (15 Oct 2018 16:30)    Skin: multiple pressure ulcer with wound care    Estimated Needs:   [X ] no change since previous assessment  [ ] recalculated:     Previous Nutrition Diagnosis:   [ ] Inadequate Energy Intake [ ]Inadequate Oral Intake [ ] Excessive Energy Intake   [ ] Underweight [ ] Increased Nutrient Needs [ ] Overweight/Obesity   [ ] Altered GI Function [ ] Unintended Weight Loss [ ] Food & Nutrition Related Knowledge Deficit [Severe] Malnutrition     Nutrition Diagnosis is [X ] ongoing  [ ] resolved [ ] not applicable     New Nutrition Diagnosis: [ ] not applicable       Interventions: To meet nutrition needs via tube feeding   Recommend  [ ] Change Diet To:  [ ] Nutrition Supplement  [X ] Nutrition Support  [ ] Other:     Monitoring and Evaluation:   [ ] PO intake [ x ] Tolerance to diet prescription [ x ] weights [ x ] labs[ x ] follow up per protocol  [X ] other: adequacy & tolerance of TF

## 2018-10-16 NOTE — PROGRESS NOTE ADULT - PROBLEM SELECTOR PLAN 3
-strict NPO as pt failed speech and swallow screen.  -IVF hydration till pt gets the PEG tube.  -I discussed with the daughter details about PEG tube, regarding its benefit and risk. Daughter Rafal understands that PEG tube placement does not prevent aspiration event and it just helps pt's nutrition, and it may not extend pt's life. -NPO, pt will get PEG tube today.  -IVF hydration till pt get the tube feeding.  -Aspiration precautions.

## 2018-10-16 NOTE — CHART NOTE - NSCHARTNOTEFT_GEN_A_CORE
Esophagogastroduodenoscopy Report  Indication: Dysphagia    Instrument:  #[id number]  Anesthesia: MAC,  Vanco + Zosyn    Consent:  Informed consent was obtained from the patient after providing any opportunity for questions  Procedure: The gastroscope was gently passed through the incisoral orifice into the oral cavity and under direct visualization the esophagus was intubated. The endoscope was passed down the esophagus, through the stomach and into the pylorus. Color, texture, mucosa, and anatomy of the esophagus, stomach and duodenum were carefully examined with the scope. A site was located by identification of light trans-illumination and 1:1 finger palpation. The area was prepared in usual sterile fashion and infiltrated with 1 % Lidocaine. The guide wire was inserted and grasped with a snare passed via the endoscope. After pulling the guide wire through the mouth a 20 FR [place] scientific gastrostomy was placed over the same guide wire and then pulled through the newly created ostomy.  An outside bumper was placed and a dressing was applied.     Preparation: NPO   Findings:   Oropharynx	Normal  Esophagus	Normal  EG-junction	Normal   Cardia	Normal  Body	Placement of a 20 [place] BS PEG tube using the procedure described above   Antrum	Normal   Pylorus	Normal  Duodenal Bulb	Normal  2nd portion	Normal  3rd portion	Normal  Date and time:10/16/2018 12:41:49 PM  EBL:0    Impression:1-  PEG tube placement     Plan: 1- PEG open to drainage 2- Peg can be used for water and medications now 3- PEG can be used for feeds 8 hours post procedure IF clinically stable 4- Stoma site care as per hospital protocol.                         Procedure Start Time:  12:26 pm  Procedure End Time:     12:34 pm             Attending:       Ranulfo Mejia M.D.  Date and Time: 10/16/2018 12:41:49 PM

## 2018-10-16 NOTE — MEDICAL STUDENT PROGRESS NOTE(EDUCATION) - NS MD HP STUD ASPLAN PLAN FT
#R 1st toe osteomyelitis  - MRI positive for osteomyelitis in the distal 1st metatarsal  - x ray positive for lucency at the 1st metatarsal head  - wound culture (10/10) resulted in MRSA  - Per Podiatry, risk outweighs the benefits for surgical intervention; continue to dressing change and debridement  - continue IV Vanco (10/10-) Vanc trough subtherapeutic, continued but changed to 750mg q12  - pending Vanc trough tomorrow    # Aspiration pneumonia, underlying viral infection  - hx of dysphagia, cough with producing clear sputum and + diffuse rhonchi  - X ray showed R anterior lung density, + RVP  - failed speech & swallow, aspiration precaution  - CT chest shows anterior pericardial effusion with tree in bud nodular opacification, RLL consolidation, infiltration in the RUL, ground glass LLL  - continue with IV Zosyn (10/10-)    # Dysphagia, malnutrition (protein, calorie malnutrition)  - PEG tube placement by GI (Jackie) today, will start medications through the tube and Jevity feeds     # Advanced dementia  - not on any medications, non verbal, non mobile  - full code per palliative care encounter    # Hypertension  - continue lisinopril, hemodynamically stable #R 1st toe osteomyelitis  - MRI positive for osteomyelitis in the distal 1st metatarsal  - x ray positive for lucency at the 1st metatarsal head  - wound culture (10/10) resulted in MRSA  - Per Podiatry, risk outweighs the benefits for surgical intervention; continue to dressing change and debridement  - Per Infectious Disease, consider PICC line placement for 6-8 week IV abx therapy  - continue IV Vanco (10/10-) Vanc trough subtherapeutic, continued but changed to 750mg q12  - pending Vanc trough tomorrow      # Aspiration pneumonia, underlying viral infection  - hx of dysphagia, cough with producing clear sputum and + diffuse rhonchi  - X ray showed R anterior lung density, + RVP  - failed speech & swallow, aspiration precaution  - CT chest shows anterior pericardial effusion with tree in bud nodular opacification, RLL consolidation, infiltration in the RUL, ground glass LLL  - continue with IV Zosyn (10/10-)    # Dysphagia, malnutrition (protein, calorie malnutrition)  - PEG tube placement by GI (Jackie) today, will start medications through the tube and Jevity feeds     # Advanced dementia  - not on any medications, non verbal, non mobile  - full code per palliative care encounter    # Hypertension  - continue lisinopril, hemodynamically stable

## 2018-10-16 NOTE — MEDICAL STUDENT PROGRESS NOTE(EDUCATION) - RS GEN PE MLT RESP DETAILS PC
+ diffuse rhonchi and wheezes in all lung bases, diminished bibasilar breath sounds/respirations non-labored

## 2018-10-16 NOTE — PROGRESS NOTE ADULT - PROBLEM SELECTOR PLAN 7
As per previous documentation. HCP is  daughter Ms. Donald [phone] and patient is full code  Palliative team Dr. Bello on board. As per previous documentation. HCP is daughter Ms. Donald [phone](cell) and patient is full code  Palliative team Dr. Bello on board.

## 2018-10-16 NOTE — PROGRESS NOTE ADULT - ASSESSMENT
86/ M Pt with PMH of BPH, HTN, and Advanced Dementia presents to ED c/o right foot ulceration x 2 weeks. Pt visited podiatrist yesterday for a worsening right foot ulceration, located by his big toe form where he was sent to hospital for further work up.    In ED:   stat dose of vanco and cefepime was given   Vitals: 136/90, 110, 98.3, 18(96)  podiatry was consulted and recommended MRI , wound culture was taken   Xray foot: Lucency at the head of the first metatarsal which may represent osteomyelitis  Xray chest: Question additional density superimposing with anterior right   first rib. Extra imaging and/or CT might be advisable.    Currently pt is treated with ABx for Rt foot osteomyelitis.

## 2018-10-17 LAB
GLUCOSE BLDC GLUCOMTR-MCNC: 108 MG/DL — HIGH (ref 70–99)
GLUCOSE BLDC GLUCOMTR-MCNC: 157 MG/DL — HIGH (ref 70–99)
GLUCOSE BLDC GLUCOMTR-MCNC: 86 MG/DL — SIGNIFICANT CHANGE UP (ref 70–99)
GLUCOSE BLDC GLUCOMTR-MCNC: 88 MG/DL — SIGNIFICANT CHANGE UP (ref 70–99)
VANCOMYCIN TROUGH SERPL-MCNC: 26.2 UG/ML — CRITICAL HIGH (ref 10–20)

## 2018-10-17 PROCEDURE — 99233 SBSQ HOSP IP/OBS HIGH 50: CPT

## 2018-10-17 PROCEDURE — 99233 SBSQ HOSP IP/OBS HIGH 50: CPT | Mod: GC

## 2018-10-17 RX ORDER — IPRATROPIUM/ALBUTEROL SULFATE 18-103MCG
3 AEROSOL WITH ADAPTER (GRAM) INHALATION EVERY 6 HOURS
Qty: 0 | Refills: 0 | Status: DISCONTINUED | OUTPATIENT
Start: 2018-10-17 | End: 2018-10-20

## 2018-10-17 RX ORDER — VANCOMYCIN HCL 1 G
500 VIAL (EA) INTRAVENOUS EVERY 12 HOURS
Qty: 0 | Refills: 0 | Status: DISCONTINUED | OUTPATIENT
Start: 2018-10-18 | End: 2018-10-20

## 2018-10-17 RX ADMIN — TAMSULOSIN HYDROCHLORIDE 0.4 MILLIGRAM(S): 0.4 CAPSULE ORAL at 21:28

## 2018-10-17 RX ADMIN — PIPERACILLIN AND TAZOBACTAM 25 GRAM(S): 4; .5 INJECTION, POWDER, LYOPHILIZED, FOR SOLUTION INTRAVENOUS at 00:09

## 2018-10-17 RX ADMIN — Medication 3 MILLILITER(S): at 15:03

## 2018-10-17 RX ADMIN — DEXTROSE MONOHYDRATE, SODIUM CHLORIDE, AND POTASSIUM CHLORIDE 75 MILLILITER(S): 50; .745; 4.5 INJECTION, SOLUTION INTRAVENOUS at 09:19

## 2018-10-17 RX ADMIN — Medication 2.5 MILLILITER(S): at 15:03

## 2018-10-17 RX ADMIN — Medication 3 MILLILITER(S): at 09:52

## 2018-10-17 RX ADMIN — Medication 2.5 MILLILITER(S): at 09:55

## 2018-10-17 RX ADMIN — Medication 250 MILLIGRAM(S): at 05:15

## 2018-10-17 RX ADMIN — PIPERACILLIN AND TAZOBACTAM 25 GRAM(S): 4; .5 INJECTION, POWDER, LYOPHILIZED, FOR SOLUTION INTRAVENOUS at 09:18

## 2018-10-17 RX ADMIN — Medication 3 MILLILITER(S): at 20:19

## 2018-10-17 RX ADMIN — Medication 81 MILLIGRAM(S): at 15:04

## 2018-10-17 RX ADMIN — Medication 2.5 MILLILITER(S): at 02:10

## 2018-10-17 RX ADMIN — Medication 3 MILLILITER(S): at 02:09

## 2018-10-17 RX ADMIN — Medication 2.5 MILLILITER(S): at 20:19

## 2018-10-17 RX ADMIN — FINASTERIDE 5 MILLIGRAM(S): 5 TABLET, FILM COATED ORAL at 15:04

## 2018-10-17 RX ADMIN — ENOXAPARIN SODIUM 30 MILLIGRAM(S): 100 INJECTION SUBCUTANEOUS at 15:05

## 2018-10-17 RX ADMIN — IRON SUCROSE 110 MILLIGRAM(S): 20 INJECTION, SOLUTION INTRAVENOUS at 05:15

## 2018-10-17 RX ADMIN — PIPERACILLIN AND TAZOBACTAM 25 GRAM(S): 4; .5 INJECTION, POWDER, LYOPHILIZED, FOR SOLUTION INTRAVENOUS at 17:47

## 2018-10-17 NOTE — PROGRESS NOTE ADULT - SUBJECTIVE AND OBJECTIVE BOX
86y Male is under our care for     REVIEW OF SYSTEMS:  [  ] Not able to illicit  General:	  Chest:	  GI:	  :  Skin:	  Musculoskeletal:	  Neuro:	    MEDS:  piperacillin/tazobactam IVPB. 3.375 Gram(s) IV Intermittent every 8 hours  vancomycin  IVPB 750 milliGRAM(s) IV Intermittent every 12 hours    ALLERGIES: Allergies    No Known Allergies    Intolerances        VITALS:  Vital Signs Last 24 Hrs  T(C): 37.7 (17 Oct 2018 14:35), Max: 37.7 (17 Oct 2018 14:35)  T(F): 99.8 (17 Oct 2018 14:35), Max: 99.8 (17 Oct 2018 14:35)  HR: 80 (17 Oct 2018 14:35) (75 - 84)  BP: 126/51 (17 Oct 2018 14:35) (106/49 - 132/64)  BP(mean): --  RR: 18 (17 Oct 2018 14:35) (18 - 18)  SpO2: 99% (17 Oct 2018 14:35) (99% - 100%)      PHYSICAL EXAM:  HEENT:  Neck:  Respiratory:  Cardiovascular:  Gastrointestinal:  Extremities:  Skin:  Ortho:  Neuro:    LABS/DIAGNOSTIC TESTS:                        8.2    9.5   )-----------( 410      ( 16 Oct 2018 06:43 )             26.3     WBC Count: 9.5 K/uL (10-16 @ 06:43)  WBC Count: 11.2 K/uL (10-15 @ 10:55)  WBC Count: 9.8 K/uL (10-14 @ 04:42)  WBC Count: 11.4 K/uL (10-13 @ 07:53)    10-16    139  |  109<H>  |  6<L>  ----------------------------<  96  3.8   |  22  |  0.86    Ca    7.7<L>      16 Oct 2018 06:43  Phos  2.7     10-16  Mg     1.7     10-16        CULTURES:   .Surgical Swab right foot ulcer  10-11 @ 04:04   Numerous Methicillin resistant Staphylococcus aureus  --  Methicillin resistant Staphylococcus aureus      .Urine Catheterized  10-10 @ 23:57   No growth  --  --      .Blood Blood-Peripheral  10-10 @ 18:23   No growth at 5 days.  --  --        RADIOLOGY:  no new studies 86y Male is under our care for right foot acute osteo and aspiration pneumonia. Patient underwent PEG placement yesterday. Has not had any fevers.  When ready for dc planning, can go on PO clindamycin via PEG.    REVIEW OF SYSTEMS:  [ X ] Not able to illicit    MEDS:  piperacillin/tazobactam IVPB. 3.375 Gram(s) IV Intermittent every 8 hours  vancomycin  IVPB 750 milliGRAM(s) IV Intermittent every 12 hours    ALLERGIES: No Known Allergies    VITALS:  Vital Signs Last 24 Hrs  T(C): 37.7 (17 Oct 2018 14:35), Max: 37.7 (17 Oct 2018 14:35)  T(F): 99.8 (17 Oct 2018 14:35), Max: 99.8 (17 Oct 2018 14:35)  HR: 80 (17 Oct 2018 14:35) (75 - 84)  BP: 126/51 (17 Oct 2018 14:35) (106/49 - 132/64)  BP(mean): --  RR: 18 (17 Oct 2018 14:35) (18 - 18)  SpO2: 99% (17 Oct 2018 14:35) (99% - 100%)      PHYSICAL EXAM:  HEENT: adentulous dry mouth  Neck: stiff arthritic neck   Respiratory: bilateral scattered rhonchi  Cardiovascular: S1 S2 reg no murmurs  Gastrointestinal: +BS with soft, nondistended abdomen; nontender  +new PEG  Extremities: no edema  Skin: right foot side wound have pus being expressed from it, podiatry will come later to change dressing  Ortho: all extremities are contracted  Neuro: demented       LABS/DIAGNOSTIC TESTS: No new labs                         8.2    9.5   )-----------( 410      ( 16 Oct 2018 06:43 )             26.3     WBC Count: 9.5 K/uL (10-16 @ 06:43)  WBC Count: 11.2 K/uL (10-15 @ 10:55)  WBC Count: 9.8 K/uL (10-14 @ 04:42)  WBC Count: 11.4 K/uL (10-13 @ 07:53)    10-16    139  |  109<H>  |  6<L>  ----------------------------<  96  3.8   |  22  |  0.86    Ca    7.7<L>      16 Oct 2018 06:43  Phos  2.7     10-16  Mg     1.7     10-16        CULTURES:   .Surgical Swab right foot ulcer  10-11 @ 04:04   Numerous Methicillin resistant Staphylococcus aureus  --  Methicillin resistant Staphylococcus aureus      .Urine Catheterized  10-10 @ 23:57   No growth  --  --      .Blood Blood-Peripheral  10-10 @ 18:23   No growth at 5 days.  --  --        RADIOLOGY:  no new studies

## 2018-10-17 NOTE — PROGRESS NOTE ADULT - SUBJECTIVE AND OBJECTIVE BOX
Subjective:   No events over night     Objective:    MEDICATIONS  (STANDING):  acetylcysteine 10% Inhalation 2.5 milliLiter(s) Inhalation every 6 hours  ALBUTerol/ipratropium for Nebulization 3 milliLiter(s) Nebulizer every 6 hours  aspirin enteric coated 81 milliGRAM(s) Oral daily  dextrose 5% + sodium chloride 0.9% with potassium chloride 20 mEq/L 1000 milliLiter(s) (75 mL/Hr) IV Continuous <Continuous>  enoxaparin Injectable 30 milliGRAM(s) SubCutaneous daily  finasteride 5 milliGRAM(s) Oral daily  influenza   Vaccine 0.5 milliLiter(s) IntraMuscular once  iron sucrose IVPB 200 milliGRAM(s) IV Intermittent every 24 hours  piperacillin/tazobactam IVPB. 3.375 Gram(s) IV Intermittent every 8 hours  tamsulosin 0.4 milliGRAM(s) Oral at bedtime  vancomycin  IVPB 750 milliGRAM(s) IV Intermittent every 12 hours    MEDICATIONS  (PRN):              Vital Signs Last 24 Hrs  T(C): 36.9 (17 Oct 2018 05:42), Max: 37.2 (16 Oct 2018 15:10)  T(F): 98.4 (17 Oct 2018 05:42), Max: 98.9 (16 Oct 2018 15:10)  HR: 75 (17 Oct 2018 05:42) (75 - 96)  BP: 132/64 (17 Oct 2018 05:42) (106/49 - 141/63)  BP(mean): --  RR: 18 (17 Oct 2018 05:42) (18 - 18)  SpO2: 100% (17 Oct 2018 05:42) (98% - 100%)      General:  Well developed, well nourished, alert and active, no pallor, NAD.  HEENT:    Normal appearance of conjunctiva, ears, nose, lips, oropharynx, and oral mucosa, anicteric.  Cardiovascular:  RRR normal S1/S2, no murmur.  Respiratory:  CTA B/L, normal respiratory effort.   Abdominal:   PEG site looks clean without erythema. PEG tube freely moveable   Extremities:   No clubbing or cyanosis, normal capillary refill, no edema.         LABS:                        8.2    9.5   )-----------( 410      ( 16 Oct 2018 06:43 )             26.3     10-16    139  |  109<H>  |  6<L>  ----------------------------<  96  3.8   |  22  |  0.86    Ca    7.7<L>      16 Oct 2018 06:43  Phos  2.7     10-16  Mg     1.7     10-16      PT/INR - ( 16 Oct 2018 06:43 )   PT: 15.6 sec;   INR: 1.42 ratio         PTT - ( 16 Oct 2018 06:43 )  PTT:32.3 sec      RADIOLOGY & ADDITIONAL TESTS:

## 2018-10-17 NOTE — PROGRESS NOTE ADULT - ASSESSMENT
1.	Right 1st toe acute osteomyelitis with MRSA  2.	S/p aspiration pneumonia tx  ·	cont zosyn 3.375gm IV to q8h D8  ·	vanco was changed to 750mgs iv q12h  ·	at time of dc will switch to clindamycin 300mg via PEG q8h to complete a total of 6 weeks treatment

## 2018-10-17 NOTE — PROGRESS NOTE ADULT - PROBLEM SELECTOR PLAN 5
not on any medications   bedbound - non verbal, non mobile for about 14 years  Palliative input appreciated, patient is full code and family wants PEG tube insertion which will be done today. not on any medications   bedbound - non verbal, non mobile for about 14 years  Palliative input appreciated, patient is full code.  Pt received PEG tube placement yesterday and will be started on feeding.

## 2018-10-17 NOTE — PROGRESS NOTE ADULT - PROBLEM SELECTOR PLAN 1
-WITH MRSA INFECTION from surgical culture from the foot wound, being treated with IV antibiotics; zosyn and vanco.  -MRI showed Findings consistent with osteomyelitis in the distal first metatarsal   neck region. As per Podiatry team, risk of surgery outweighs benefit so no surgical intervention is recommended.   -Continue contact isolation  -Continue Vanco and Zosyn while inpatient. Pt will likely to be discharged with PO meds.  -Recheck the trough today at 17:00, before the 4th dose.

## 2018-10-17 NOTE — PROGRESS NOTE ADULT - SUBJECTIVE AND OBJECTIVE BOX
Patient is a 86y old  Male who presents with a chief complaint of Rt foot ulcer (17 Oct 2018 11:17)      INTERVAL HPI/OVERNIGHT EVENTS: Patient underwent PEG tube placement. No fever.    T(C): 36.9 (10-17-18 @ 05:42), Max: 37.2 (10-16-18 @ 15:10)  HR: 75 (10-17-18 @ 05:42) (75 - 96)  BP: 132/64 (10-17-18 @ 05:42) (106/49 - 141/63)  RR: 18 (10-17-18 @ 05:42) (18 - 18)  SpO2: 100% (10-17-18 @ 05:42) (98% - 100%)  Wt(kg): --  I&O's Summary      LABS:                        8.2    9.5   )-----------( 410      ( 16 Oct 2018 06:43 )             26.3     10-16    139  |  109<H>  |  6<L>  ----------------------------<  96  3.8   |  22  |  0.86    Ca    7.7<L>      16 Oct 2018 06:43  Phos  2.7     10-16  Mg     1.7     10-16      PT/INR - ( 16 Oct 2018 06:43 )   PT: 15.6 sec;   INR: 1.42 ratio         PTT - ( 16 Oct 2018 06:43 )  PTT:32.3 sec    CAPILLARY BLOOD GLUCOSE      POCT Blood Glucose.: 86 mg/dL (17 Oct 2018 07:47)  POCT Blood Glucose.: 157 mg/dL (17 Oct 2018 06:22)  POCT Blood Glucose.: 123 mg/dL (16 Oct 2018 23:46)  POCT Blood Glucose.: 111 mg/dL (16 Oct 2018 18:32)              MEDICATIONS  (STANDING):  acetylcysteine 10% Inhalation 2.5 milliLiter(s) Inhalation every 6 hours  ALBUTerol/ipratropium for Nebulization 3 milliLiter(s) Nebulizer every 6 hours  aspirin enteric coated 81 milliGRAM(s) Oral daily  dextrose 5% + sodium chloride 0.9% with potassium chloride 20 mEq/L 1000 milliLiter(s) (75 mL/Hr) IV Continuous <Continuous>  enoxaparin Injectable 30 milliGRAM(s) SubCutaneous daily  finasteride 5 milliGRAM(s) Oral daily  influenza   Vaccine 0.5 milliLiter(s) IntraMuscular once  iron sucrose IVPB 200 milliGRAM(s) IV Intermittent every 24 hours  piperacillin/tazobactam IVPB. 3.375 Gram(s) IV Intermittent every 8 hours  tamsulosin 0.4 milliGRAM(s) Oral at bedtime  vancomycin  IVPB 750 milliGRAM(s) IV Intermittent every 12 hours        REVIEW OF SYSTEMS: Limited as patient is nonverbal and bedbound.  CONSTITUTIONAL: No fever  EYES: No discharge  NECK: No stiffness  RESPIRATORY: + sputum, improved  CARDIOVASCULAR: No palpitations  GASTROINTESTINAL: No nausea, vomiting, or hematemesis; No diarrhea or constipation.  NEUROLOGICAL: No tremors  MUSCULOSKELETAL: right foot ulcer    PHYSICAL EXAM:  GENERAL: NAD, cachectic  HEAD:  Atraumatic, Normocephalic  EYES: PERRLA, conjunctiva and sclera clear  NECK: Supple, No JVD, Normal thyroid  NERVOUS SYSTEM:  demented  CHEST/LUNG:  no rales, no rhonchi, no wheezing, or rubs  HEART: Regular rate and rhythm; No murmurs, rubs, or gallops  ABDOMEN: Soft, Nontender, Nondistended; Bowel sounds present. PEG tube site clean and does not have any discharge.  EXTREMITIES:  Right foot ulcers  LYMPH: No lymphadenopathy noted  SKIN: right foot ulcers      Care Discussed with Consultants/Other Providers [x ] YES  [ ] NO

## 2018-10-17 NOTE — PROGRESS NOTE ADULT - PROBLEM SELECTOR PLAN 7
As per previous documentation. HCP is daughter Ms. Donald [phone](cell) and patient is full code  Palliative team Dr. Bello on board.

## 2018-10-17 NOTE — PROGRESS NOTE ADULT - PROBLEM SELECTOR PROBLEM 7
Goals of care, counseling/discussion
Need for prophylactic measure

## 2018-10-17 NOTE — PROGRESS NOTE ADULT - PROBLEM SELECTOR PLAN 6
IMPROVE VTE Individual Risk Assessment          RISK                                                          Points  [  ] Previous VTE                                                3  [  ] Thrombophilia                                             2  [  ] Lower limb paralysis                                   2        (unable to hold up >15 seconds)    [  ] Current Cancer                                             2         (within 6 months)  [ x ] Immobilization > 24 hrs                              1  [  ] ICU/CCU stay > 24 hours                             1  [ x ] Age > 60                                                         1    IMPROVE VTE Score: 2  continue with lovenox daily subcu IMPROVE VTE Individual Risk Assessment          RISK                                                          Points  [  ] Previous VTE                                                3  [  ] Thrombophilia                                             2  [  ] Lower limb paralysis                                   2        (unable to hold up >15 seconds)    [  ] Current Cancer                                             2         (within 6 months)  [ x ] Immobilization > 24 hrs                              1  [  ] ICU/CCU stay > 24 hours                             1  [ x ] Age > 60                                                         1    IMPROVE VTE Score: 2  continue with lovenox daily subcu  **Dispo: to be decided by the family.

## 2018-10-17 NOTE — PROGRESS NOTE ADULT - ATTENDING COMMENTS
Patient seen and examined this morning with daughter and nephew at bedside; Agree with PGY3  A/P above with editing as needed. Discussed with Dr. Horn    Patient is doing okay, clinically appears somewhat better, in no acute distress, s/p PEG yesterday afternoon.     Vital Signs Last 24 Hrs  T(C): 36.9 (17 Oct 2018 05:42), Max: 37.2 (16 Oct 2018 15:10)  T(F): 98.4 (17 Oct 2018 05:42), Max: 98.9 (16 Oct 2018 15:10)  HR: 75 (17 Oct 2018 05:42) (75 - 96)  BP: 132/64 (17 Oct 2018 05:42) (106/49 - 141/63)  RR: 18 (17 Oct 2018 05:42) (18 - 18)  SpO2: 100% (17 Oct 2018 05:42) (98% - 100%)    P/E:  Neuro: Limited  CVS: S1S2 present, Regular  Resp: BLAE+, No wheeze; Coarse Breath sounds Left hemithorax less pronounced today  GI: Soft, BS+, NT, ND  Extr; Contracted with no edema or calf tenderness and toe ulcers (s/p dressing by Podiatry)     Labs:                                  8.2    9.5   )-----------( 410      ( 16 Oct 2018 06:43 )             26.3   10-16    139  |  109<H>  |  6<L>  ----------------------------<  96  3.8   |  22  |  0.86    Ca    7.7<L>      16 Oct 2018 06:43  Phos  2.7     10-16  Mg     1.7     10-16    TPro  6.3  /  Alb  1.9<L>  /  TBili  0.6  /  DBili  0.2  /  AST  28  /  ALT  39  /  AlkPhos  58  10-15    D/D:  1. Sepsis due to  2. cellulitis infected ulcer of right foot with osteomyelitis of right great toe  3. with RLL consolidation probably Aspiration Pneumonia  4. enterovirus infection/bronchitis  5. severe protein calorie malnutrition  6. Anemia Iron deficient  7. advanced alzheimer's dementia bedbound status s/p CVA  8. dysphagia  9. debility    Plan:  -continue with zosyn and vancomycin; ID Follow up appreciated; Resumed Vanco adjusted dose  -Repeat trough prior to 3rd dose   -As per ID considering treating with Antibiotics; will follow yp with daughter  -Discussed with daughter this morning agreed with PEG and consented  -Discussed with GI Dr. Mejia this morning s/p Peg today; May use for meds today; Start fees tomorrow morning  -Add Venofer x 3 days as Iron studies c/w Iron deficiency  =Discussed with daughter possible Rehab, may consider  -q 2 hour turns, HOB 45 degree,   -bronchodilators with Mucomyst to aid in expectoration of secretions; will D/C Mucomyst in AM if no significant upper airway secretions.   -DVT prophylaxis  -Prognosis is  poor and guarded; Family wants FULL CODE;   -Podiatry follow up  -Will give a lab holiday tomorrow.   VANCO TROUGH 5 PM TOMORROW    Discussed with  Manager Sonja Starr to follow up with daughter for Rehab choices if interested as patient is expected to be medically stable in the next 48 hrs  If daughter not interested, will need family to learn tube feeds.     Discussed with PGY3 Dr. Horn  Discussed with RN Patient seen and examined this morning with daughter and nephew at bedside; Agree with PGY3  A/P above with editing as needed. Discussed with Dr. Horn    Patient is doing okay, clinically appears somewhat better, in no acute distress, s/p PEG yesterday afternoon.     Vital Signs Last 24 Hrs  T(C): 36.9 (17 Oct 2018 05:42), Max: 37.2 (16 Oct 2018 15:10)  T(F): 98.4 (17 Oct 2018 05:42), Max: 98.9 (16 Oct 2018 15:10)  HR: 75 (17 Oct 2018 05:42) (75 - 96)  BP: 132/64 (17 Oct 2018 05:42) (106/49 - 141/63)  RR: 18 (17 Oct 2018 05:42) (18 - 18)  SpO2: 100% (17 Oct 2018 05:42) (98% - 100%)    P/E:  Neuro: Limited  CVS: S1S2 present, Regular  Resp: BLAE+, No wheeze; Coarse Breath sounds Left hemithorax less pronounced today  GI: Soft, BS+, No significant tenderness appreciated, Not distended  Extr; Contracted with no edema or calf tenderness and toe ulcers (s/p dressing by Podiatry)     Labs: No new           D/D:  1. Sepsis due to  2. Cellulitis infected ulcer of right foot with osteomyelitis of right great toe   3. with RLL consolidation probably Aspiration Pneumonia  4. enterovirus infection/bronchitis seems resolved  5. severe protein calorie malnutrition  6. Anemia Iron deficient  7. advanced alzheimer's dementia bedbound status s/p CVA  8. dysphagia  9. debility    Plan:  -continue with zosyn and vancomycin; ID Follow up appreciated;   Follow up Vanco trough this evening  -Discussed with daughter this morning at bedisde plan for likely Antibiotics via PEG given underlying comorbidities.   Start feeding 15 cc/hr and titrate up by 10 cc every 4 hrs to goal rate  -GI follow up appreciated;   -Also advised daughter to learn feeds as well as other family members also even though patient is temporarily planned for Rehab.   -Daughter has provided potential choices to ; Discussed with JUN Starr, will send out PATRIA  -Add Venofer x 3 days as Iron studies c/w Iron deficiency  -q 2 hour turns, HOB 45 degree,   -Continue Bronchodilators; Will discontinue Mucomyst.   -DVT prophylaxis  -Podiatry follow up  -d/w RN, will give Tylenol prn for pain    -Prognosis is  poor and guarded; Family wants FULL CODE;     VANCO TROUGH 5 PM     Patient will be expected to be medically stable for discharge by tomorrow. Will need Auth from Insurance    Discussed with PGY3 Dr. Horn  Discussed with RN Patient seen and examined this morning with daughter and nephew at bedside; Agree with PGY3  A/P above with editing as needed. Discussed with Dr. Horn    Patient is doing okay, clinically appears somewhat better, in no acute distress, s/p PEG yesterday afternoon.     Vital Signs Last 24 Hrs  T(C): 36.9 (17 Oct 2018 05:42), Max: 37.2 (16 Oct 2018 15:10)  T(F): 98.4 (17 Oct 2018 05:42), Max: 98.9 (16 Oct 2018 15:10)  HR: 75 (17 Oct 2018 05:42) (75 - 96)  BP: 132/64 (17 Oct 2018 05:42) (106/49 - 141/63)  RR: 18 (17 Oct 2018 05:42) (18 - 18)  SpO2: 100% (17 Oct 2018 05:42) (98% - 100%)    P/E:  Neuro: Limited  CVS: S1S2 present, Regular  Resp: BLAE+, No wheeze; Coarse Breath sounds Left hemithorax less pronounced today  GI: Soft, BS+, No significant tenderness appreciated, Not distended  Extr; Contracted with no edema or calf tenderness and toe ulcers (s/p dressing by Podiatry)     Labs: No new           D/D:  1. Sepsis due to  2. Cellulitis infected ulcer of right foot with osteomyelitis of right great toe   3. with RLL consolidation probably Aspiration Pneumonia  4. enterovirus infection/bronchitis seems resolved  5. severe protein calorie malnutrition  6. Anemia Iron deficient plus chronic disease likely  7. advanced alzheimer's dementia bedbound status s/p CVA  8. dysphagia  9. debility    Plan:  -continue with zosyn and vancomycin; ID Follow up appreciated;   Follow up Vanco trough this evening  -Discussed with daughter this morning at bedisde plan for likely Antibiotics via PEG given underlying comorbidities.   Start feeding 15 cc/hr and titrate up by 10 cc every 4 hrs to goal rate  -GI follow up appreciated;   -Also advised daughter to learn feeds as well as other family members also even though patient is temporarily planned for Rehab.   -Daughter has provided potential choices to ; Discussed with JUN Starr, will send out PATRIA  -Add Venofer x 3 days as Iron studies c/w Iron deficiency  -q 2 hour turns, HOB 45 degree,   -Continue Bronchodilators; Will discontinue Mucomyst.   -DVT prophylaxis  -Podiatry follow up  -d/w RN, will give Tylenol prn for pain  -d/w ID, will follow up and recommend oral Antibiotics to be given via PEG  -Prognosis is  poor and guarded; Family wants FULL CODE;     VANCO TROUGH 5 PM     Patient will be expected to be medically stable for discharge by tomorrow. Will need Auth from Insurance    Discussed with PGY3 Dr. Horn  Discussed with RN

## 2018-10-17 NOTE — PROGRESS NOTE ADULT - ASSESSMENT
S.p PEG   Doing well   Cleared to start PEG feeds   Titrate up as tolerated   PEG stoma site care   Aspiration precautions

## 2018-10-18 LAB
ANION GAP SERPL CALC-SCNC: 9 MMOL/L — SIGNIFICANT CHANGE UP (ref 5–17)
BUN SERPL-MCNC: 5 MG/DL — LOW (ref 7–18)
CALCIUM SERPL-MCNC: 7.9 MG/DL — LOW (ref 8.4–10.5)
CHLORIDE SERPL-SCNC: 104 MMOL/L — SIGNIFICANT CHANGE UP (ref 96–108)
CO2 SERPL-SCNC: 23 MMOL/L — SIGNIFICANT CHANGE UP (ref 22–31)
CREAT SERPL-MCNC: 0.89 MG/DL — SIGNIFICANT CHANGE UP (ref 0.5–1.3)
GLUCOSE SERPL-MCNC: 83 MG/DL — SIGNIFICANT CHANGE UP (ref 70–99)
HCT VFR BLD CALC: 26 % — LOW (ref 39–50)
HGB BLD-MCNC: 8.1 G/DL — LOW (ref 13–17)
MAGNESIUM SERPL-MCNC: 1.6 MG/DL — SIGNIFICANT CHANGE UP (ref 1.6–2.6)
MCHC RBC-ENTMCNC: 26.6 PG — LOW (ref 27–34)
MCHC RBC-ENTMCNC: 31 GM/DL — LOW (ref 32–36)
MCV RBC AUTO: 86.1 FL — SIGNIFICANT CHANGE UP (ref 80–100)
PHOSPHATE SERPL-MCNC: 2.5 MG/DL — SIGNIFICANT CHANGE UP (ref 2.5–4.5)
PLATELET # BLD AUTO: 385 K/UL — SIGNIFICANT CHANGE UP (ref 150–400)
POTASSIUM SERPL-MCNC: 3.4 MMOL/L — LOW (ref 3.5–5.3)
POTASSIUM SERPL-SCNC: 3.4 MMOL/L — LOW (ref 3.5–5.3)
RBC # BLD: 3.02 M/UL — LOW (ref 4.2–5.8)
RBC # FLD: 14.3 % — SIGNIFICANT CHANGE UP (ref 10.3–14.5)
SODIUM SERPL-SCNC: 136 MMOL/L — SIGNIFICANT CHANGE UP (ref 135–145)
VANCOMYCIN TROUGH SERPL-MCNC: 18.8 UG/ML — SIGNIFICANT CHANGE UP (ref 10–20)
WBC # BLD: 7.9 K/UL — SIGNIFICANT CHANGE UP (ref 3.8–10.5)
WBC # FLD AUTO: 7.9 K/UL — SIGNIFICANT CHANGE UP (ref 3.8–10.5)

## 2018-10-18 PROCEDURE — 99233 SBSQ HOSP IP/OBS HIGH 50: CPT | Mod: GC

## 2018-10-18 RX ORDER — QUINAPRIL HYDROCHLORIDE 40 MG/1
1 TABLET, FILM COATED ORAL
Qty: 0 | Refills: 0 | COMMUNITY

## 2018-10-18 RX ORDER — POTASSIUM CHLORIDE 20 MEQ
10 PACKET (EA) ORAL
Qty: 0 | Refills: 0 | Status: COMPLETED | OUTPATIENT
Start: 2018-10-18 | End: 2018-10-18

## 2018-10-18 RX ADMIN — Medication 100 MILLIEQUIVALENT(S): at 11:29

## 2018-10-18 RX ADMIN — Medication 100 MILLIEQUIVALENT(S): at 09:28

## 2018-10-18 RX ADMIN — Medication 81 MILLIGRAM(S): at 11:29

## 2018-10-18 RX ADMIN — FINASTERIDE 5 MILLIGRAM(S): 5 TABLET, FILM COATED ORAL at 11:29

## 2018-10-18 RX ADMIN — Medication 3 MILLILITER(S): at 03:02

## 2018-10-18 RX ADMIN — Medication 2.5 MILLILITER(S): at 20:27

## 2018-10-18 RX ADMIN — Medication 100 MILLIGRAM(S): at 17:48

## 2018-10-18 RX ADMIN — Medication 3 MILLILITER(S): at 20:27

## 2018-10-18 RX ADMIN — IRON SUCROSE 110 MILLIGRAM(S): 20 INJECTION, SOLUTION INTRAVENOUS at 06:02

## 2018-10-18 RX ADMIN — ENOXAPARIN SODIUM 30 MILLIGRAM(S): 100 INJECTION SUBCUTANEOUS at 11:29

## 2018-10-18 RX ADMIN — PIPERACILLIN AND TAZOBACTAM 25 GRAM(S): 4; .5 INJECTION, POWDER, LYOPHILIZED, FOR SOLUTION INTRAVENOUS at 17:48

## 2018-10-18 RX ADMIN — Medication 2.5 MILLILITER(S): at 03:02

## 2018-10-18 RX ADMIN — PIPERACILLIN AND TAZOBACTAM 25 GRAM(S): 4; .5 INJECTION, POWDER, LYOPHILIZED, FOR SOLUTION INTRAVENOUS at 08:50

## 2018-10-18 RX ADMIN — Medication 100 MILLIEQUIVALENT(S): at 10:53

## 2018-10-18 RX ADMIN — Medication 2.5 MILLILITER(S): at 15:24

## 2018-10-18 RX ADMIN — PIPERACILLIN AND TAZOBACTAM 25 GRAM(S): 4; .5 INJECTION, POWDER, LYOPHILIZED, FOR SOLUTION INTRAVENOUS at 00:09

## 2018-10-18 RX ADMIN — Medication 3 MILLILITER(S): at 08:58

## 2018-10-18 RX ADMIN — TAMSULOSIN HYDROCHLORIDE 0.4 MILLIGRAM(S): 0.4 CAPSULE ORAL at 21:42

## 2018-10-18 RX ADMIN — Medication 2.5 MILLILITER(S): at 08:58

## 2018-10-18 RX ADMIN — Medication 3 MILLILITER(S): at 15:24

## 2018-10-18 NOTE — PROGRESS NOTE ADULT - ATTENDING COMMENTS
Patient seen and examined this morning with daughter and nephew at bedside; Agree with PGY3  A/P above with editing as needed. Discussed with Dr. Horn    Patient is doing well from presentation, clinically appears better, in no acute distress, s/p PEG tolerating continuous feeds. Has some upper airway secretions but much improved.  Family has decided to take patient home and not Rehab at this time.      Vital Signs Last 24 Hrs  T(C): 36.6 (18 Oct 2018 14:50), Max: 37.1 (17 Oct 2018 20:50)  T(F): 97.8 (18 Oct 2018 14:50), Max: 98.7 (17 Oct 2018 20:50)  HR: 88 (18 Oct 2018 14:50) (80 - 88)  BP: 113/64 (18 Oct 2018 14:50) (112/64 - 119/55)  RR: 18 (18 Oct 2018 14:50) (18 - 18)  SpO2: 99% (18 Oct 2018 14:50) (97% - 99%)    P/E:  Neuro: Limited  CVS: S1S2 present, Regular  Resp: BLAE+, No wheeze or Rhonchi B/L  GI: Soft, BS+, No significant tenderness appreciated, Not distended  Extr; Contracted with no edema or calf tenderness and toe ulcers dressing intact    Labs: As above                        8.1    7.9   )-----------( 385      ( 18 Oct 2018 06:16 )             26.0   10-18    136  |  104  |  5<L>  ----------------------------<  83  3.4<L>   |  23  |  0.89    Ca    7.9<L>      18 Oct 2018 06:16  Phos  2.5     10-18  Mg     1.6     10-18           D/D:  1. Sepsis due to  2. Cellulitis infected ulcer of right foot with osteomyelitis of right great toe   3. with RLL consolidation probably Aspiration Pneumonia resolving well  4. enterovirus infection/bronchitis seems resolved  5. severe protein calorie malnutrition s/p PEG  6. Anemia Iron deficient plus chronic disease likely  7. advanced alzheimer's dementia bedbound status s/p CVA  8. dysphagia with Aspiration risk  9. debility    Plan:  -continue with zosyn and vancomycin; ID Follow up appreciated;   Vanco trough therapeutic  -Discussed with daughter this morning at bedisde plan for likely Antibiotics via PEG given underlying comorbidities.   Start feeding 15 cc/hr and titrate up by 10 cc every 4 hrs to goal rate  -GI follow up appreciated;   -Also advised daughter to learn feeds as well as other family members also even though patient is temporarily planned for Rehab.   -Daughter has provided potential choices to ; Discussed with JUN Starr, will send out PATRIA  -Add Venofer x 3 days as Iron studies c/w Iron deficiency  -q 2 hour turns, HOB 45 degree,   -Continue Bronchodilators; Will discontinue Mucomyst.   -DVT prophylaxis  -Podiatry follow up  -d/w RN, will give Tylenol prn for pain  -d/w ID, will follow up and recommend oral Antibiotics to be given via PEG  -Prognosis is  poor and guarded; Family wants FULL CODE;     VANCO TROUGH 5 PM     Patient will be expected to be medically stable for discharge by tomorrow. Will need Auth from Insurance    Discussed with PGY3 Dr. Horn  Discussed with RN Patient seen and examined this morning with daughter and nephew at bedside; Agree with PGY3  A/P above with editing as needed. Discussed with Dr. Horn    Patient is doing well from presentation, clinically appears better, in no acute distress, s/p PEG tolerating continuous feeds. Has some upper airway secretions but much improved.  Family has decided to take patient home and not Rehab at this time.      Vital Signs Last 24 Hrs  T(C): 36.6 (18 Oct 2018 14:50), Max: 37.1 (17 Oct 2018 20:50)  T(F): 97.8 (18 Oct 2018 14:50), Max: 98.7 (17 Oct 2018 20:50)  HR: 88 (18 Oct 2018 14:50) (80 - 88)  BP: 113/64 (18 Oct 2018 14:50) (112/64 - 119/55)  RR: 18 (18 Oct 2018 14:50) (18 - 18)  SpO2: 99% (18 Oct 2018 14:50) (97% - 99%)    P/E:  Neuro: Limited  CVS: S1S2 present, Regular  Resp: BLAE+, No wheeze or Rhonchi B/L  GI: Soft, BS+, No significant tenderness appreciated, Not distended  Extr; Contracted with no edema or calf tenderness and toe ulcers dressing intact    Labs: As above                        8.1    7.9   )-----------( 385      ( 18 Oct 2018 06:16 )             26.0   10-18    136  |  104  |  5<L>  ----------------------------<  83  3.4<L>   |  23  |  0.89    Ca    7.9<L>      18 Oct 2018 06:16  Phos  2.5     10-18  Mg     1.6     10-18           D/D:  1. Sepsis due to  2. Cellulitis infected ulcer of right foot with osteomyelitis of right great toe   3. with RLL consolidation probably Aspiration Pneumonia resolving well  4. enterovirus infection/bronchitis seems resolved  5. severe protein calorie malnutrition s/p PEG  6. Anemia Iron deficient plus chronic disease likely  7. advanced alzheimer's dementia bedbound status s/p CVA  8. dysphagia with Aspiration risk  9. debility    Plan:  -continue with zosyn and vancomycin; ID Follow up appreciated;   Vanco trough therapeutic; continue until discharge  Discussed with ID, will give Clindamycin solution 75mg/5 ml 20 ml q 8 hrs on discharge;   -Discussed with daughter this morning at bedside plan for likely Antibiotics via PEG given underlying comorbidities.   -Discussed with daughter wants to take home. hence will switch to Bolus feeds 4 cans daily which will help RN teach family better.   -Also advised daughter to learn feeds as well as other family members   -Continue Venofer x 3 days  -q 2 hour turns, HOB 45 degree,   -DVT prophylaxis  -Podiatry follow up  -d/w RN, will give Tylenol prn for pain    -Prognosis is  poor and guarded; Family wants FULL CODE;     Patient will be expected to be medically stable for discharge.    Discussed with PGY3 Dr. Horn  Discussed with RN Patient seen and examined this morning with daughter and nephew at bedside; Agree with PGY3  A/P above with editing as needed. Discussed with Dr. Horn    Patient is doing well from presentation, clinically appears better, in no acute distress, s/p PEG tolerating continuous feeds. Has some upper airway secretions but much improved.  Family has decided to take patient home and not Rehab at this time.      Vital Signs Last 24 Hrs  T(C): 36.6 (18 Oct 2018 14:50), Max: 37.1 (17 Oct 2018 20:50)  T(F): 97.8 (18 Oct 2018 14:50), Max: 98.7 (17 Oct 2018 20:50)  HR: 88 (18 Oct 2018 14:50) (80 - 88)  BP: 113/64 (18 Oct 2018 14:50) (112/64 - 119/55)  RR: 18 (18 Oct 2018 14:50) (18 - 18)  SpO2: 99% (18 Oct 2018 14:50) (97% - 99%)    P/E:  Neuro: Limited  CVS: S1S2 present, Regular  Resp: BLAE+, No wheeze or Rhonchi B/L  GI: Soft, BS+, No significant tenderness appreciated, Not distended  Extr; Contracted with no edema or calf tenderness and toe ulcers dressing intact    Labs: As above                        8.1    7.9   )-----------( 385      ( 18 Oct 2018 06:16 )             26.0   10-18    136  |  104  |  5<L>  ----------------------------<  83  3.4<L>   |  23  |  0.89    Ca    7.9<L>      18 Oct 2018 06:16  Phos  2.5     10-18  Mg     1.6     10-18           D/D:  1. Sepsis due to  2. Cellulitis infected ulcer of right foot with osteomyelitis of right great toe   3. with RLL consolidation probably Aspiration Pneumonia resolving well  4. enterovirus infection/bronchitis seems resolved  5. severe protein calorie malnutrition s/p PEG  6. Anemia Iron deficient plus chronic disease likely  7. advanced alzheimer's dementia bedbound status s/p CVA  8. dysphagia with Aspiration risk  9. debility    Plan:  -continue with zosyn and vancomycin; ID Follow up appreciated;   Vanco trough therapeutic; continue until discharge  Discussed with ID, will give Clindamycin solution 75mg/5 ml 20 ml q 8 hrs on discharge;   -Discussed with daughter this morning at bedside plan for likely Antibiotics via PEG given underlying comorbidities.   -Discussed with daughter wants to take home. hence will switch to Bolus feeds 4 cans daily which will help RN teach family better.   -Also advised daughter to learn feeds as well as other family members   -Continue Venofer x 3 days total  -q 2 hour turns, HOB 45 degree,   -DVT prophylaxis  -Podiatry follow up  -d/w RN, will give Tylenol prn for pain    -Prognosis is  poor and guarded; Family wants FULL CODE;     Patient is medically stable for discharge. prefer Rehab for feeding and wound care. Family refused .   D/C Home with Home care and VNS tomorrow    Discussed with PGY3 Dr. Horn  Discussed with RN

## 2018-10-18 NOTE — PROGRESS NOTE ADULT - PROBLEM SELECTOR PLAN 6
IMPROVE VTE Individual Risk Assessment          RISK                                                          Points  [  ] Previous VTE                                                3  [  ] Thrombophilia                                             2  [  ] Lower limb paralysis                                   2        (unable to hold up >15 seconds)    [  ] Current Cancer                                             2         (within 6 months)  [ x ] Immobilization > 24 hrs                              1  [  ] ICU/CCU stay > 24 hours                             1  [ x ] Age > 60                                                         1    IMPROVE VTE Score: 2  continue with lovenox daily subcu  **Dispo: potential rehab per family wish.

## 2018-10-18 NOTE — PROGRESS NOTE ADULT - PROBLEM SELECTOR PLAN 5
not on any medications   bedbound - non verbal, non mobile for about 14 years  Palliative input appreciated, patient is full code.  Pt received PEG tube placement was started on feeding, which was well tolerated.

## 2018-10-18 NOTE — PROGRESS NOTE ADULT - SUBJECTIVE AND OBJECTIVE BOX
Patient is a 86y old  Male who presents with a chief complaint of Rt foot ulcer (17 Oct 2018 14:10)      INTERVAL HPI/OVERNIGHT EVENTS: Afebrile for 24hrs.    T(C): 36.9 (10-18-18 @ 05:32), Max: 37.7 (10-17-18 @ 14:35)  HR: 80 (10-18-18 @ 05:32) (80 - 82)  BP: 112/64 (10-18-18 @ 05:32) (112/64 - 126/51)  RR: 18 (10-18-18 @ 05:32) (18 - 18)  SpO2: 97% (10-18-18 @ 05:32) (97% - 99%)  Wt(kg): --  I&O's Summary        LABS:                        8.1    7.9   )-----------( 385      ( 18 Oct 2018 06:16 )             26.0     10-18    136  |  104  |  5<L>  ----------------------------<  83  3.4<L>   |  23  |  0.89    Ca    7.9<L>      18 Oct 2018 06:16  Phos  2.5     10-18  Mg     1.6     10-18          CAPILLARY BLOOD GLUCOSE      POCT Blood Glucose.: 108 mg/dL (17 Oct 2018 18:27)  POCT Blood Glucose.: 88 mg/dL (17 Oct 2018 11:53)          MEDICATIONS  (STANDING):  acetylcysteine 10% Inhalation 2.5 milliLiter(s) Inhalation every 6 hours  ALBUTerol/ipratropium for Nebulization 3 milliLiter(s) Nebulizer every 6 hours  aspirin enteric coated 81 milliGRAM(s) Oral daily  enoxaparin Injectable 30 milliGRAM(s) SubCutaneous daily  finasteride 5 milliGRAM(s) Oral daily  influenza   Vaccine 0.5 milliLiter(s) IntraMuscular once  iron sucrose IVPB 200 milliGRAM(s) IV Intermittent every 24 hours  piperacillin/tazobactam IVPB. 3.375 Gram(s) IV Intermittent every 8 hours  potassium chloride  10 mEq/100 mL IVPB 10 milliEquivalent(s) IV Intermittent every 1 hour  tamsulosin 0.4 milliGRAM(s) Oral at bedtime  vancomycin  IVPB 500 milliGRAM(s) IV Intermittent every 12 hours    MEDICATIONS  (PRN):      RADIOLOGY & ADDITIONAL TESTS:    Imaging Personally Reviewed:  [ x] YES  [ ] NO    Consultant(s) Notes Reviewed:  [x ] YES  [ ] NO    REVIEW OF SYSTEMS: Limited as patient is nonverbal and bedbound.  CONSTITUTIONAL: No fever  EYES: No discharge  NECK: No stiffness  RESPIRATORY: + sputum, improved  CARDIOVASCULAR: No palpitations  GASTROINTESTINAL: No nausea, vomiting, or hematemesis; No diarrhea or constipation.  NEUROLOGICAL: No tremors  MUSCULOSKELETAL: right foot ulcer    PHYSICAL EXAM:  GENERAL: NAD, cachectic  HEAD:  Atraumatic, Normocephalic  EYES: PERRLA, conjunctiva and sclera clear  NECK: Supple, No JVD, Normal thyroid  NERVOUS SYSTEM:  demented  CHEST/LUNG:  no rales, no rhonchi, no wheezing, or rubs  HEART: Regular rate and rhythm; No murmurs, rubs, or gallops  ABDOMEN: Soft, Nontender, Nondistended; Bowel sounds present. PEG tube site clean and does not have any discharge.  EXTREMITIES:  Right foot ulcers  LYMPH: No lymphadenopathy noted  SKIN: right foot ulcers    Care Discussed with Consultants/Other Providers [x ] YES  [ ] NO

## 2018-10-18 NOTE — PROGRESS NOTE ADULT - ASSESSMENT
1.	Right 1st toe acute osteomyelitis with MRSA  2.	S/p aspiration pneumonia tx  ·	cont zosyn 3.375gm IV to q8h D9  ·	vanco decreased to 500mgs iv q12h  ·	at time of dc will switch to clindamycin 300mg via PEG q8h to complete a total of 6 weeks treatment

## 2018-10-18 NOTE — PROGRESS NOTE ADULT - PROBLEM SELECTOR PLAN 1
-WITH MRSA INFECTION from surgical culture from the foot wound, c/w zosyn and vanco. Vanco dose was supratherapeutic so dose was adjusted again.  -MRI showed Findings consistent with osteomyelitis in the distal first metatarsal   neck region. As per Podiatry team, risk of surgery outweighs benefit so no surgical intervention is recommended.   -Continue contact isolation  -Continue Vanco and Zosyn while inpatient. Pt will likely to be discharged with PO meds per ID. Dr. Zimmer's rec.

## 2018-10-18 NOTE — PROGRESS NOTE ADULT - SUBJECTIVE AND OBJECTIVE BOX
86y Male is under our care for right foot acute osteo and aspiration pneumonia. Patient is stable and has not had any overnight events. Arrangements underway for dc planning to home.  Remains afebrile with normal wbc count. When ready for dc planning, can go on PO clindamycin via PEG.    REVIEW OF SYSTEMS:  [ X ] Not able to illicit    MEDS:  piperacillin/tazobactam IVPB. 3.375 Gram(s) IV Intermittent every 8 hours  vancomycin  IVPB 500 milliGRAM(s) IV Intermittent every 12 hours    ALLERGIES: No Known Allergies    VITALS:  Vital Signs Last 24 Hrs  T(C): 36.9 (18 Oct 2018 05:32), Max: 37.7 (17 Oct 2018 14:35)  T(F): 98.5 (18 Oct 2018 05:32), Max: 99.8 (17 Oct 2018 14:35)  HR: 80 (18 Oct 2018 05:32) (80 - 82)  BP: 112/64 (18 Oct 2018 05:32) (112/64 - 126/51)  BP(mean): --  RR: 18 (18 Oct 2018 05:32) (18 - 18)  SpO2: 97% (18 Oct 2018 05:32) (97% - 99%)      PHYSICAL EXAM:  HEENT: adentulous dry mouth  Neck: stiff arthritic neck   Respiratory: bilateral scattered rhonchi  Cardiovascular: S1 S2 reg no murmurs  Gastrointestinal: +BS with soft, nondistended abdomen; nontender  +PEG  Extremities: no edema  Skin: right foot is dressed  Ortho: contracture of all extremities   Neuro: demented       LABS/DIAGNOSTIC TESTS:                         8.1    7.9   )-----------( 385      ( 18 Oct 2018 06:16 )             26.0   10-18    136  |  104  |  5<L>  ----------------------------<  83  3.4<L>   |  23  |  0.89    Ca    7.9<L>      18 Oct 2018 06:16  Phos  2.5     10-18  Mg     1.6     10-18    Vancomycin Level, Trough (10.18.18 @ 06:16)    Vancomycin Level, Trough: 18.8  Vancomycin Level, Trough (10.17.18 @ 16:37)    Vancomycin Level, Trough: 26.2        CULTURES:   .Surgical Swab right foot ulcer  10-11 @ 04:04   Numerous Methicillin resistant Staphylococcus aureus  --  Methicillin resistant Staphylococcus aureus      .Urine Catheterized  10-10 @ 23:57   No growth  --  --      .Blood Blood-Peripheral  10-10 @ 18:23   No growth at 5 days.  --  --        RADIOLOGY:  no new studies

## 2018-10-19 LAB — GLUCOSE BLDC GLUCOMTR-MCNC: 121 MG/DL — HIGH (ref 70–99)

## 2018-10-19 PROCEDURE — 99233 SBSQ HOSP IP/OBS HIGH 50: CPT | Mod: GC

## 2018-10-19 PROCEDURE — 99232 SBSQ HOSP IP/OBS MODERATE 35: CPT

## 2018-10-19 RX ORDER — TAMSULOSIN HYDROCHLORIDE 0.4 MG/1
1 CAPSULE ORAL
Qty: 30 | Refills: 0 | OUTPATIENT
Start: 2018-10-19 | End: 2018-11-17

## 2018-10-19 RX ORDER — ASPIRIN/CALCIUM CARB/MAGNESIUM 324 MG
1 TABLET ORAL
Qty: 0 | Refills: 0 | COMMUNITY

## 2018-10-19 RX ORDER — FINASTERIDE 5 MG/1
1 TABLET, FILM COATED ORAL
Qty: 30 | Refills: 0 | OUTPATIENT
Start: 2018-10-19 | End: 2018-11-17

## 2018-10-19 RX ORDER — ASCORBIC ACID 60 MG
1 TABLET,CHEWABLE ORAL
Qty: 30 | Refills: 0 | OUTPATIENT
Start: 2018-10-19 | End: 2018-11-17

## 2018-10-19 RX ORDER — MULTIVIT WITH MIN/MFOLATE/K2 340-15/3 G
300 POWDER (GRAM) ORAL ONCE
Qty: 0 | Refills: 0 | Status: COMPLETED | OUTPATIENT
Start: 2018-10-19 | End: 2018-10-19

## 2018-10-19 RX ORDER — TAMSULOSIN HYDROCHLORIDE 0.4 MG/1
1 CAPSULE ORAL
Qty: 0 | Refills: 0 | COMMUNITY

## 2018-10-19 RX ORDER — IPRATROPIUM/ALBUTEROL SULFATE 18-103MCG
3 AEROSOL WITH ADAPTER (GRAM) INHALATION
Qty: 120 | Refills: 0 | OUTPATIENT
Start: 2018-10-19 | End: 2018-11-17

## 2018-10-19 RX ORDER — ASCORBIC ACID 60 MG
500 TABLET,CHEWABLE ORAL DAILY
Qty: 0 | Refills: 0 | Status: DISCONTINUED | OUTPATIENT
Start: 2018-10-19 | End: 2018-10-20

## 2018-10-19 RX ORDER — ASPIRIN/CALCIUM CARB/MAGNESIUM 324 MG
1 TABLET ORAL
Qty: 30 | Refills: 0 | OUTPATIENT
Start: 2018-10-19 | End: 2018-11-17

## 2018-10-19 RX ORDER — FINASTERIDE 5 MG/1
1 TABLET, FILM COATED ORAL
Qty: 0 | Refills: 0 | COMMUNITY

## 2018-10-19 RX ORDER — ZINC SULFATE TAB 220 MG (50 MG ZINC EQUIVALENT) 220 (50 ZN) MG
2 TAB ORAL
Qty: 60 | Refills: 0 | OUTPATIENT
Start: 2018-10-19 | End: 2018-11-17

## 2018-10-19 RX ORDER — ZINC SULFATE TAB 220 MG (50 MG ZINC EQUIVALENT) 220 (50 ZN) MG
220 TAB ORAL DAILY
Qty: 0 | Refills: 0 | Status: DISCONTINUED | OUTPATIENT
Start: 2018-10-19 | End: 2018-10-20

## 2018-10-19 RX ADMIN — Medication 3 MILLILITER(S): at 09:11

## 2018-10-19 RX ADMIN — TAMSULOSIN HYDROCHLORIDE 0.4 MILLIGRAM(S): 0.4 CAPSULE ORAL at 21:29

## 2018-10-19 RX ADMIN — ENOXAPARIN SODIUM 30 MILLIGRAM(S): 100 INJECTION SUBCUTANEOUS at 12:20

## 2018-10-19 RX ADMIN — ZINC SULFATE TAB 220 MG (50 MG ZINC EQUIVALENT) 220 MILLIGRAM(S): 220 (50 ZN) TAB at 12:20

## 2018-10-19 RX ADMIN — IRON SUCROSE 110 MILLIGRAM(S): 20 INJECTION, SOLUTION INTRAVENOUS at 06:46

## 2018-10-19 RX ADMIN — Medication 100 MILLIGRAM(S): at 06:35

## 2018-10-19 RX ADMIN — INFLUENZA VIRUS VACCINE 0.5 MILLILITER(S): 15; 15; 15; 15 SUSPENSION INTRAMUSCULAR at 11:08

## 2018-10-19 RX ADMIN — PIPERACILLIN AND TAZOBACTAM 25 GRAM(S): 4; .5 INJECTION, POWDER, LYOPHILIZED, FOR SOLUTION INTRAVENOUS at 17:53

## 2018-10-19 RX ADMIN — PIPERACILLIN AND TAZOBACTAM 25 GRAM(S): 4; .5 INJECTION, POWDER, LYOPHILIZED, FOR SOLUTION INTRAVENOUS at 01:08

## 2018-10-19 RX ADMIN — PIPERACILLIN AND TAZOBACTAM 25 GRAM(S): 4; .5 INJECTION, POWDER, LYOPHILIZED, FOR SOLUTION INTRAVENOUS at 08:38

## 2018-10-19 RX ADMIN — Medication 500 MILLIGRAM(S): at 12:20

## 2018-10-19 RX ADMIN — Medication 300 MILLILITER(S): at 13:41

## 2018-10-19 RX ADMIN — Medication 3 MILLILITER(S): at 03:32

## 2018-10-19 RX ADMIN — Medication 100 MILLIGRAM(S): at 17:53

## 2018-10-19 RX ADMIN — Medication 81 MILLIGRAM(S): at 12:20

## 2018-10-19 RX ADMIN — Medication 2.5 MILLILITER(S): at 03:32

## 2018-10-19 RX ADMIN — Medication 2.5 MILLILITER(S): at 09:15

## 2018-10-19 RX ADMIN — FINASTERIDE 5 MILLIGRAM(S): 5 TABLET, FILM COATED ORAL at 12:20

## 2018-10-19 RX ADMIN — Medication 3 MILLILITER(S): at 14:55

## 2018-10-19 RX ADMIN — Medication 3 MILLILITER(S): at 21:24

## 2018-10-19 NOTE — CHART NOTE - NSCHARTNOTEFT_GEN_A_CORE
Spoke with RN around 8.30 PM as patient was still inhouse, Patient was not discharged as planned as there was some residual at 6PM while evening bolus feed was about to be given and discharge was held by staff. D/C Home tomorrow if remain stable and no residual. If so, will recommend to cut back feeds to 3 cans a day with free water.

## 2018-10-19 NOTE — PROGRESS NOTE ADULT - SUBJECTIVE AND OBJECTIVE BOX
86y Male is under our care for right foot acute osteo and aspiration pneumonia. Patient is in disposition. Awaiting arrangements for dc planning to home.  Remains afebrile with normal wbc count. Upon dc, can go on PO clindamycin via PEG.    REVIEW OF SYSTEMS:  [ X ] Not able to illicit    MEDS:  piperacillin/tazobactam IVPB. 3.375 Gram(s) IV Intermittent every 8 hours  vancomycin  IVPB 500 milliGRAM(s) IV Intermittent every 12 hours    ALLERGIES: No Known Allergies    VITALS:  Vital Signs Last 24 Hrs  T(C): 37.1 (19 Oct 2018 05:30), Max: 37.1 (18 Oct 2018 20:33)  T(F): 98.8 (19 Oct 2018 05:30), Max: 98.8 (18 Oct 2018 20:33)  HR: 84 (19 Oct 2018 05:30) (72 - 88)  BP: 120/61 (19 Oct 2018 05:30) (113/64 - 120/61)  BP(mean): --  RR: 18 (19 Oct 2018 05:30) (18 - 18)  SpO2: 96% (19 Oct 2018 05:30) (96% - 99%)      PHYSICAL EXAM:  HEENT: adentulous dry mouth  Neck: stiff arthritic neck   Respiratory: bilateral scattered rhonchi  Cardiovascular: S1 S2 reg no murmurs  Gastrointestinal: +BS with soft, nondistended abdomen; nontender  +PEG  Extremities: no edema  Skin: right foot is dressed  Ortho: contracture of all extremities   Neuro: demented       LABS/DIAGNOSTIC TESTS: No new labs       CULTURES:   .Surgical Swab right foot ulcer  10-11 @ 04:04   Numerous Methicillin resistant Staphylococcus aureus  --  Methicillin resistant Staphylococcus aureus      .Urine Catheterized  10-10 @ 23:57   No growth  --  --      .Blood Blood-Peripheral  10-10 @ 18:23   No growth at 5 days.  --  --        RADIOLOGY:  no new studies

## 2018-10-19 NOTE — CHART NOTE - NSCHARTNOTEFT_GEN_A_CORE
<Wound care>  Remove dressing from right foot  rinse wounds with normal saline.  dress with adaptic and gauze and huong   place foot in offloading foam

## 2018-10-19 NOTE — CHART NOTE - NSCHARTNOTEFT_GEN_A_CORE
Patient seen and examined this morning with daughters at bedside. Appeared clinically well and in no distress.    Vital Signs Last 24 Hrs  T(C): 37.3 (19 Oct 2018 20:27), Max: 37.3 (19 Oct 2018 20:27)  T(F): 99.1 (19 Oct 2018 20:27), Max: 99.1 (19 Oct 2018 20:27)  HR: 89 (19 Oct 2018 20:27) (84 - 89)  BP: 111/51 (19 Oct 2018 20:27) (111/51 - 123/70)  RR: 18 (19 Oct 2018 20:27) (18 - 18)  SpO2: 98% (19 Oct 2018 20:27) (96% - 100%)    P/E;  CVS: S1S2 present, regular  resp: BLAE+, Coarse breath sounds on admission has resolved; No wheeze or rhonchi  GI: Soft, BS+, NT, Peg in place site clean  Extr; contracted    Labs: No new Patient seen and examined this morning with daughters at bedside. Appeared clinically well and in no distress.    Vital Signs Last 24 Hrs  T(C): 37.3 (19 Oct 2018 20:27), Max: 37.3 (19 Oct 2018 20:27)  T(F): 99.1 (19 Oct 2018 20:27), Max: 99.1 (19 Oct 2018 20:27)  HR: 89 (19 Oct 2018 20:27) (84 - 89)  BP: 111/51 (19 Oct 2018 20:27) (111/51 - 123/70)  RR: 18 (19 Oct 2018 20:27) (18 - 18)  SpO2: 98% (19 Oct 2018 20:27) (96% - 100%)    P/E;  CVS: S1S2 present, regular  resp: BLAE+, Coarse breath sounds on admission has resolved; No wheeze or rhonchi  GI: Soft, BS+, NT, Peg in place site clean  Extr; contracted    Labs: No new    D/D:  1. Sepsis due to  2. Cellulitis infected ulcer of right foot with osteomyelitis of right great toe   3. with RLL consolidation probably Aspiration Pneumonia resolving well  4. enterovirus infection/bronchitis seems resolved  5. severe protein calorie malnutrition s/p PEG  6. Anemia Iron deficient plus chronic disease likely  7. advanced alzheimer's dementia bedbound status s/p CVA  8. dysphagia with Aspiration risk  9. debility    Plan:  Patient stable for discharge on Clindamycin solution 75mg/5 ml 20 ml q 8 hrs for 33 more days  Discussed with Pharmacy medication available at the pharmacy and as reconstituted medication can only stay for 2 weeks, they will dispense 2 week medication at a time; daughter was informed at bedside  -q 2 hour turns, HOB 45 degree while feeding and keep for an hour advised. ,   -Home Care for VNS referred  -Discussed with  Sonja Starr; feeding arrangements made  Added Vitamin C and Zn  -d/w RN, will give Tylenol prn for pain    -Prognosis is  poor and guarded; Family wants FULL CODE;     D/C Home with Home care and VNS. Discharge papers completed; see d/c note for details    Discussed with daughters at length at bedside.   Discussed with PGY3 Dr. Horn  Discussed with RN .

## 2018-10-19 NOTE — CHART NOTE - NSCHARTNOTEFT_GEN_A_CORE
Reassessment: S/p PEG placement.   86yMalePatient is a 86y old  Male who presents with a chief complaint of left foot ulcer (19 Oct 2018 13:48)      Factors impacting intake: [ ] none [ ] nausea  [ ] vomiting [ ] diarrhea [ ] constipation  [ ]chewing problems [ ] swallowing issues  [X ] other: FTT    Diet Presciption: Diet, NPO with Tube Feed:   Tube Feeding Modality: Gastrostomy  Jevity 1.5 Lupillo  Total Volume for 24 Hours (mL): 960  Bolus   Total Volume of Bolus (mL):  240  Bolus Feed Rate (mL per Hour): 240   Bolus Feed Duration (in Hours): 1  Tube Feed Frequency: Every 6 hours   Tube Feed Start Time: 12:05 (10-18-18 @ 12:02)    Intake: Request by  for extra 8 cans (8oz) of tube feeding Jevity 1.5 Kcal to give family since d/c to home & provided, as pt. will be receiving cans of Jevity 1.5 Kcal via [employer] pending Saturday delivery. Visited pt. family at bedside, receiving bolus feeding & tolerating, followed by GI & wound care, d/w RN, c/w Jevity 1.5 Kcal, 4 cans (8oz) daily (providing 960 ml, 1440 Kcal, 729 ml free water) MD to adjust fluids as needed, as medically feasible.    Daily Weight in k.9 (19 Oct 2018 05:30)  Weight in k.6 (18 Oct 2018 05:32)  Weight in k.1 (17 Oct 2018 05:42)  Weight in k (13 Oct 2018 06:03)    % Weight Change; N/A    Pertinent Medications: MEDICATIONS  (STANDING):  ALBUTerol/ipratropium for Nebulization 3 milliLiter(s) Nebulizer every 6 hours  ascorbic acid 500 milliGRAM(s) Oral daily  aspirin enteric coated 81 milliGRAM(s) Oral daily  enoxaparin Injectable 30 milliGRAM(s) SubCutaneous daily  finasteride 5 milliGRAM(s) Oral daily  iron sucrose IVPB 200 milliGRAM(s) IV Intermittent every 24 hours  piperacillin/tazobactam IVPB. 3.375 Gram(s) IV Intermittent every 8 hours  tamsulosin 0.4 milliGRAM(s) Oral at bedtime  vancomycin  IVPB 500 milliGRAM(s) IV Intermittent every 12 hours  zinc sulfate 220 milliGRAM(s) Oral daily    MEDICATIONS  (PRN):    Pertinent Labs: 10-18 Na136 mmol/L Glu 83 mg/dL K+ 3.4 mmol/L<L> Cr  0.89 mg/dL BUN 5 mg/dL<L> 10-18 Phos 2.5 mg/dL 10-15 Alb 1.9 g/dL<L> 10- [id number] 5.5 % 10- Chol 88 mg/dL LDL 66 mg/dL HDL 10 mg/dL<L> Trig 60 mg/dL     CAPILLARY BLOOD GLUCOSE        Skin: multiple pressure ulcer    Estimated Needs:   [ x] no change since previous assessment  [ ] recalculated:     Previous Nutrition Diagnosis:   [ ] Inadequate Energy Intake [ ]Inadequate Oral Intake [ ] Excessive Energy Intake   [ ] Underweight [ ] Increased Nutrient Needs [ ] Overweight/Obesity   [ ] Altered GI Function [ ] Unintended Weight Loss [ ] Food & Nutrition Related Knowledge Deficit [ Severe] Malnutrition     Nutrition Diagnosis is [x ] ongoing  [ ] resolved [ ] not applicable     New Nutrition Diagnosis: [ ] not applicable       Interventions: To meet nutrition needs via tube feeding   Recommend  [ ] Change Diet To:  [ ] Nutrition Supplement  [ ] Nutrition Support  [ ] Other:     Monitoring and Evaluation:   [ ] PO intake [ x ] Tolerance to diet prescription [ x ] weights [ x ] labs[ x ] follow up per protocol  [ ] other:

## 2018-10-19 NOTE — PROGRESS NOTE ADULT - ASSESSMENT
S.p PEG . Doing well. Discussed with family at bedside regarding proper PEG and stoma care. Family to call my office if they HAVE any questions.

## 2018-10-19 NOTE — PROGRESS NOTE ADULT - SUBJECTIVE AND OBJECTIVE BOX
Subjective:     Tolerating PEG feeds   Objective:    MEDICATIONS  (STANDING):  ALBUTerol/ipratropium for Nebulization 3 milliLiter(s) Nebulizer every 6 hours  ascorbic acid 500 milliGRAM(s) Oral daily  aspirin enteric coated 81 milliGRAM(s) Oral daily  enoxaparin Injectable 30 milliGRAM(s) SubCutaneous daily  finasteride 5 milliGRAM(s) Oral daily  iron sucrose IVPB 200 milliGRAM(s) IV Intermittent every 24 hours  piperacillin/tazobactam IVPB. 3.375 Gram(s) IV Intermittent every 8 hours  tamsulosin 0.4 milliGRAM(s) Oral at bedtime  vancomycin  IVPB 500 milliGRAM(s) IV Intermittent every 12 hours  zinc sulfate 220 milliGRAM(s) Oral daily    MEDICATIONS  (PRN):              Vital Signs Last 24 Hrs  T(C): 37.1 (19 Oct 2018 05:30), Max: 37.1 (18 Oct 2018 20:33)  T(F): 98.8 (19 Oct 2018 05:30), Max: 98.8 (18 Oct 2018 20:33)  HR: 84 (19 Oct 2018 05:30) (72 - 88)  BP: 120/61 (19 Oct 2018 05:30) (113/64 - 120/61)  BP(mean): --  RR: 18 (19 Oct 2018 05:30) (18 - 18)  SpO2: 96% (19 Oct 2018 05:30) (96% - 99%)      General:  Well developed, well nourished, alert and active, no pallor, NAD.  HEENT:    Normal appearance of conjunctiva, ears, nose, lips, oropharynx, and oral mucosa, anicteric.  Cardiovascular:  RRR normal S1/S2, no murmur.  Respiratory:  CTA B/L, normal respiratory effort.   Abdominal:   Peg looks clean dn is freely moveable.   Extremities:   No clubbing or cyanosis, normal capillary refill, no edema.       LABS:                        8.1    7.9   )-----------( 385      ( 18 Oct 2018 06:16 )             26.0     10-18    136  |  104  |  5<L>  ----------------------------<  83  3.4<L>   |  23  |  0.89    Ca    7.9<L>      18 Oct 2018 06:16  Phos  2.5     10-18  Mg     1.6     10-18            RADIOLOGY & ADDITIONAL TESTS:

## 2018-10-19 NOTE — PROGRESS NOTE ADULT - ASSESSMENT
1.	Right 1st toe acute osteomyelitis with MRSA  2.	S/p aspiration pneumonia tx  ·	cont zosyn 3.375gm IV q8h D10  ·	cont vanco 500mgs iv q12h  ·	at time of dc will switch to clindamycin 300mg via PEG q8h until 11/12/18

## 2018-10-19 NOTE — PROGRESS NOTE ADULT - SUBJECTIVE AND OBJECTIVE BOX
Patient is a 86y old  Male who presents with a chief complaint of right foot ulcers    HPI: Per Family 86 year old M Pt w/ PMHx of BPH, HTN, and Advanced Dementia presents to ED c/o right foot ulceration x 2 weeks. Pt visited podiatrist yesterday for a worsening right foot ulceration, located by his big toe. Pt was referred to ED for evaluation of possible osteomyelitis yesterday, but was unable to come to ED yesterday as it was not possible to arrange an ambulance.  NKDA  Pt seen bedside by podiatry. Pt unable to communicate, pt in NAD.     PMH:HTN (hypertension)  Advanced dementia  BPH (benign prostatic hyperplasia)    Allergies: No Known Allergies    Medications:   FH:  PSX: No significant past surgical history    SH:     ICU Vital Signs Last 24 Hrs  T(C): 36.6 (16 Oct 2018 05:15), Max: 37.4 (15 Oct 2018 14:23)  T(F): 97.9 (16 Oct 2018 05:15), Max: 99.3 (15 Oct 2018 14:23)  HR: 84 (16 Oct 2018 05:15) (84 - 91)  BP: 125/62 (16 Oct 2018 05:15) (117/42 - 125/62)  BP(mean): --  ABP: --  ABP(mean): --  RR: 18 (16 Oct 2018 05:15) (18 - 18)  SpO2: 99% (16 Oct 2018 05:15) (97% - 99%)        LABS                        8.2    9.5   )-----------( 410      ( 16 Oct 2018 06:43 )             26.3   10-16    139  |  109<H>  |  6<L>  ----------------------------<  96  3.8   |  22  |  0.86    Ca    7.7<L>      16 Oct 2018 06:43  Phos  2.7     10-16  Mg     1.7     10-16    TPro  6.3  /  Alb  1.9<L>  /  TBili  0.6  /  DBili  0.2  /  AST  28  /  ALT  39  /  AlkPhos  58  10-15          PHYSICAL EXAM  GEN: PERLA HER is a pleasant well-nourished, well developed 86y Male in no acute distress, alert awake, and oriented to person, place and time.   LE Focused: right foot focused   Vasc:  DP/PT palpable, CFT < 3 secs x 5, no edema, TG warm to cool  Derm: ulcers: medial aspect of 1st MPJ, dorsal foot, lateral ankle, no drainage, -PTB to all, no malodor, fibrogranular base, normal borders  Neuro: unable to perform      Imaging: < from: Xray Foot AP + Lateral + Oblique, Right (10.10.18 @ 15:27) >  PROCEDURE DATE:  10/10/2018          INTERPRETATION:  CLINICAL STATEMENT: Pain. Evaluate for osteomyelitis   first toe    TECHNIQUE: AP, lateral and oblique views of right foot    COMPARISON: None.    FINDINGS:  Diffuse soft tissue swelling first digit. Flexion of the phalanges   limited evaluation of the phalanges.. Diffuse osteopenia. Difficult to   evaluate first interphalangeal joint.    Lucency at the head of the first metatarsal which may represent   osteomyelitis in the correct clinical setting. Correlate clinically.    Otherwise, no acute fracture        IMPRESSION:  Findings as described above.     < end of copied text >    ------------------------------------------------------    < from: MR Foot No Cont, Right (10.12.18 @ 15:02) >  PROCEDURE DATE:  10/12/2018          INTERPRETATION:  MRI OF THE RIGHT FOOT:    CLINICAL INDICATION: Right osteomyelitis, right foot.    TECHNIQUE: Multiplanar, muliti-sequential MRI of the right foot was   performed without contrast. The study is degraded by motion artifact.    FINDINGS:    Motion degraded images demonstrate diffuse bone marrow edema pattern and   associated low T1 signal alteration in the first metatarsal head and   first metatarsal neck region consistent with osteomyelitis. Findings   compatible with osteomyelitis is also suspected in the medial hallux   sesamoid which is diffusely low signal on the T1-weighted sequences and   markedly hyperintense on fluid sensitive sequences.  Mild bone marrow   edema pattern is also noted at the base of the first proximal phalanx   with associated subtle low T1 signal alteration.  Diffuse bone marrow   edema pattern and subtle low T1 signal alteration also noted in the   lateral hallux sesamoid, also suggestive of osteomyelitis. There is   surrounding soft tissue swelling compatible with cellulitis. There is no   drainable fluid collection or abscess. There is suggestion of skin   ulceration along the medial aspect of the first metatarsal head. The   remaining osseous structures demonstrate normal signal.     IMPRESSION:    Motion degraded study.    Findings consistent with osteomyelitis in the distal first metatarsal   neck region, throughout the first metatarsal head and throughout the   medial hallux sesamoid. Osteomyelitis is also suspected at the base of   the first proximal phalanx as well as within the lateral hallux sesamoid.   There is surrounding cellulitis. There is no drainable fluid collection   or abscess.    < end of copied text >  -------------------------------------------------------------    Culture - Surgical Swab (10.11.18 @ 04:04)    Specimen Source: .Surgical Swab right foot ulcer    Culture Results:   Numerous Staphylococcus aureus      Cultures: preliminary    A:   right foot ulcers  Osteomyelitis    P:  pt evaluated and chart reviewed  culture - preliminary results above  dressing changed, DSD  xrays reviewed see above  MRI results see above  previously discussed with family results of MRI and treatment options and choosing IV antibiotics due to risks of surgery outweighing benefits of surgery. Pt's granddaughter understands and agrees  No plan for surgical intervention at this time  IV antibiotics as per ID  pt stable from podiatric standpoint  Podiatry to follow while in house

## 2018-10-20 VITALS
SYSTOLIC BLOOD PRESSURE: 116 MMHG | RESPIRATION RATE: 18 BRPM | TEMPERATURE: 98 F | OXYGEN SATURATION: 95 % | DIASTOLIC BLOOD PRESSURE: 54 MMHG | HEART RATE: 104 BPM

## 2018-10-20 LAB
ANION GAP SERPL CALC-SCNC: 10 MMOL/L — SIGNIFICANT CHANGE UP (ref 5–17)
BUN SERPL-MCNC: 8 MG/DL — SIGNIFICANT CHANGE UP (ref 7–18)
CALCIUM SERPL-MCNC: 7.7 MG/DL — LOW (ref 8.4–10.5)
CHLORIDE SERPL-SCNC: 101 MMOL/L — SIGNIFICANT CHANGE UP (ref 96–108)
CO2 SERPL-SCNC: 24 MMOL/L — SIGNIFICANT CHANGE UP (ref 22–31)
CREAT SERPL-MCNC: 0.99 MG/DL — SIGNIFICANT CHANGE UP (ref 0.5–1.3)
GLUCOSE BLDC GLUCOMTR-MCNC: 117 MG/DL — HIGH (ref 70–99)
GLUCOSE BLDC GLUCOMTR-MCNC: 117 MG/DL — HIGH (ref 70–99)
GLUCOSE BLDC GLUCOMTR-MCNC: 122 MG/DL — HIGH (ref 70–99)
GLUCOSE SERPL-MCNC: 101 MG/DL — HIGH (ref 70–99)
HCT VFR BLD CALC: 27.3 % — LOW (ref 39–50)
HGB BLD-MCNC: 8.6 G/DL — LOW (ref 13–17)
MAGNESIUM SERPL-MCNC: 2 MG/DL — SIGNIFICANT CHANGE UP (ref 1.6–2.6)
MCHC RBC-ENTMCNC: 26.9 PG — LOW (ref 27–34)
MCHC RBC-ENTMCNC: 31.3 GM/DL — LOW (ref 32–36)
MCV RBC AUTO: 85.9 FL — SIGNIFICANT CHANGE UP (ref 80–100)
PHOSPHATE SERPL-MCNC: 2.3 MG/DL — LOW (ref 2.5–4.5)
PLATELET # BLD AUTO: 430 K/UL — HIGH (ref 150–400)
POTASSIUM SERPL-MCNC: 3.6 MMOL/L — SIGNIFICANT CHANGE UP (ref 3.5–5.3)
POTASSIUM SERPL-SCNC: 3.6 MMOL/L — SIGNIFICANT CHANGE UP (ref 3.5–5.3)
RBC # BLD: 3.18 M/UL — LOW (ref 4.2–5.8)
RBC # FLD: 14.7 % — HIGH (ref 10.3–14.5)
SODIUM SERPL-SCNC: 135 MMOL/L — SIGNIFICANT CHANGE UP (ref 135–145)
VANCOMYCIN TROUGH SERPL-MCNC: 19.2 UG/ML — SIGNIFICANT CHANGE UP (ref 10–20)
WBC # BLD: 12.2 K/UL — HIGH (ref 3.8–10.5)
WBC # FLD AUTO: 12.2 K/UL — HIGH (ref 3.8–10.5)

## 2018-10-20 PROCEDURE — 99233 SBSQ HOSP IP/OBS HIGH 50: CPT | Mod: GC

## 2018-10-20 RX ORDER — POTASSIUM PHOSPHATE, MONOBASIC POTASSIUM PHOSPHATE, DIBASIC 236; 224 MG/ML; MG/ML
15 INJECTION, SOLUTION INTRAVENOUS ONCE
Qty: 0 | Refills: 0 | Status: COMPLETED | OUTPATIENT
Start: 2018-10-20 | End: 2018-10-20

## 2018-10-20 RX ADMIN — Medication 500 MILLIGRAM(S): at 12:29

## 2018-10-20 RX ADMIN — IRON SUCROSE 110 MILLIGRAM(S): 20 INJECTION, SOLUTION INTRAVENOUS at 05:34

## 2018-10-20 RX ADMIN — Medication 100 MILLIGRAM(S): at 06:56

## 2018-10-20 RX ADMIN — ZINC SULFATE TAB 220 MG (50 MG ZINC EQUIVALENT) 220 MILLIGRAM(S): 220 (50 ZN) TAB at 12:29

## 2018-10-20 RX ADMIN — PIPERACILLIN AND TAZOBACTAM 25 GRAM(S): 4; .5 INJECTION, POWDER, LYOPHILIZED, FOR SOLUTION INTRAVENOUS at 01:07

## 2018-10-20 RX ADMIN — FINASTERIDE 5 MILLIGRAM(S): 5 TABLET, FILM COATED ORAL at 12:30

## 2018-10-20 RX ADMIN — POTASSIUM PHOSPHATE, MONOBASIC POTASSIUM PHOSPHATE, DIBASIC 62.5 MILLIMOLE(S): 236; 224 INJECTION, SOLUTION INTRAVENOUS at 12:29

## 2018-10-20 RX ADMIN — Medication 81 MILLIGRAM(S): at 12:29

## 2018-10-20 RX ADMIN — ENOXAPARIN SODIUM 30 MILLIGRAM(S): 100 INJECTION SUBCUTANEOUS at 12:29

## 2018-10-20 RX ADMIN — Medication 3 MILLILITER(S): at 08:20

## 2018-10-20 RX ADMIN — PIPERACILLIN AND TAZOBACTAM 25 GRAM(S): 4; .5 INJECTION, POWDER, LYOPHILIZED, FOR SOLUTION INTRAVENOUS at 08:36

## 2018-10-20 RX ADMIN — Medication 3 MILLILITER(S): at 02:12

## 2018-10-20 NOTE — PROGRESS NOTE ADULT - SUBJECTIVE AND OBJECTIVE BOX
86y Male is under our care for right foot acute osteo and aspiration pneumonia. Pt's discharge was held yesterday due to high PEG tube residuals, possible discharge today, family questioning discharge since the pt is constipated. No overnight events, pt is in bed, no distress, family by the bedside. Pt is afebrile, increase in WBCs noted, Vanco through 19.2. Upon dc, can go on PO clindamycin via PEG.    MEDS:  piperacillin/tazobactam IVPB. 3.375 Gram(s) IV Intermittent every 8 hours  vancomycin  IVPB 500 milliGRAM(s) IV Intermittent every 12 hours    No Known Allergies    VITALS:  Vital Signs Last 24 Hrs  T(C): 37.4 (20 Oct 2018 05:30), Max: 37.4 (20 Oct 2018 05:30)  T(F): 99.3 (20 Oct 2018 05:30), Max: 99.3 (20 Oct 2018 05:30)  HR: 101 (20 Oct 2018 05:30) (86 - 101)  BP: 106/55 (20 Oct 2018 05:30) (106/55 - 123/70)  BP(mean): --  RR: 18 (20 Oct 2018 05:30) (18 - 18)  SpO2: 96% (20 Oct 2018 05:30) (96% - 100%)    LABS/DIAGNOSTIC TESTS:                          8.6    12.2  )-----------( 430      ( 20 Oct 2018 06:46 )             27.3     WBC trend   12.2  7.9  9.5    10-20    135  |  101  |  8   ----------------------------<  101<H>  3.6   |  24  |  0.99    Ca    7.7<L>      20 Oct 2018 06:46  Phos  2.3     10-20  Mg     2.0     10-20        CULTURES:   .Surgical Swab right foot ulcer  10-11 @ 04:04   Numerous Methicillin resistant Staphylococcus aureus  --  Methicillin resistant Staphylococcus aureus      .Urine Catheterized  10-10 @ 23:57   No growth  --  --      .Blood Blood-Peripheral  10-10 @ 18:23   No growth at 5 days.  --  --      ROS:  [ x ] UNABLE TO ELICIT

## 2018-10-20 NOTE — PROGRESS NOTE ADULT - PROBLEM SELECTOR PROBLEM 3
Dysphagia, unspecified type
BPH (benign prostatic hyperplasia)
Dysphagia, unspecified type
HTN (hypertension)
HTN (hypertension)

## 2018-10-20 NOTE — PROGRESS NOTE ADULT - PROBLEM SELECTOR PROBLEM 4
HTN (hypertension)
Advanced dementia
BPH (benign prostatic hyperplasia)
BPH (benign prostatic hyperplasia)
HTN (hypertension)

## 2018-10-20 NOTE — PROGRESS NOTE ADULT - PROBLEM SELECTOR PLAN 1
-DC on Clindmycin 75mg/5ml [id number]  until November 19th -DC on Clindamycin 75mg/5ml [id number]  until November 19th

## 2018-10-20 NOTE — PROGRESS NOTE ADULT - ASSESSMENT
1.	Right 1st toe acute osteomyelitis with MRSA  2.	S/p aspiration pneumonia tx  ·	cont zosyn 3.375gm IV q8h D11  ·	cont vanco 500mgs iv q12h  ·	at time of dc will switch to clindamycin 300mg via PEG q8h until 11/12/18

## 2018-10-20 NOTE — PROGRESS NOTE ADULT - PROBLEM SELECTOR PROBLEM 5
Advanced dementia
BPH (benign prostatic hyperplasia)
Need for prophylactic measure

## 2018-10-20 NOTE — PROGRESS NOTE ADULT - PROBLEM SELECTOR PLAN 6
IMPROVE VTE Individual Risk Assessment          RISK                                                          Points  [  ] Previous VTE                                                3  [  ] Thrombophilia                                             2  [  ] Lower limb paralysis                                   2        (unable to hold up >15 seconds)    [  ] Current Cancer                                             2         (within 6 months)  [ x ] Immobilization > 24 hrs                              1  [  ] ICU/CCU stay > 24 hours                             1  [ x ] Age > 60                                                         1    IMPROVE VTE Score: 2  continue with lovenox daily subcu  **Dispo: potential rehab per family wish. IMPROVE VTE Individual Risk Assessment          RISK                                                          Points  [  ] Previous VTE                                                3  [  ] Thrombophilia                                             2  [  ] Lower limb paralysis                                   2        (unable to hold up >15 seconds)    [  ] Current Cancer                                             2         (within 6 months)  [ x ] Immobilization > 24 hrs                              1  [  ] ICU/CCU stay > 24 hours                             1  [ x ] Age > 60                                                         1    IMPROVE VTE Score: 2

## 2018-10-20 NOTE — PROGRESS NOTE ADULT - PROBLEM SELECTOR PROBLEM 1
Osteomyelitis

## 2018-10-20 NOTE — PROGRESS NOTE ADULT - PROBLEM SELECTOR PROBLEM 6
Need for prophylactic measure
Advanced dementia
Goals of care, counseling/discussion
Need for prophylactic measure

## 2018-10-20 NOTE — PROGRESS NOTE ADULT - SUBJECTIVE AND OBJECTIVE BOX
MEDICAL ATTENDING NOTE    Patient is a 86y old  Male who presents with a chief complaint of left foot ulcer (20 Oct 2018 12:44)      INTERVAL HPI/OVERNIGHT EVENTS: Pt was to be discharged yesterday but had high residuals. Today no residual.    MEDICATIONS  (STANDING):  ALBUTerol/ipratropium for Nebulization 3 milliLiter(s) Nebulizer every 6 hours  ascorbic acid 500 milliGRAM(s) Oral daily  aspirin enteric coated 81 milliGRAM(s) Oral daily  enoxaparin Injectable 30 milliGRAM(s) SubCutaneous daily  finasteride 5 milliGRAM(s) Oral daily  iron sucrose IVPB 200 milliGRAM(s) IV Intermittent every 24 hours  piperacillin/tazobactam IVPB. 3.375 Gram(s) IV Intermittent every 8 hours  tamsulosin 0.4 milliGRAM(s) Oral at bedtime  vancomycin  IVPB 500 milliGRAM(s) IV Intermittent every 12 hours  zinc sulfate 220 milliGRAM(s) Oral daily    MEDICATIONS  (PRN):      __________________________________________________  REVIEW OF SYSTEMS:    CONSTITUTIONAL: No fever,   EYES: no acute visual disturbances  NECK: No pain or stiffness  RESPIRATORY: No cough; No shortness of breath  CARDIOVASCULAR: No chest pain, no palpitations  GASTROINTESTINAL: No pain. No nausea or vomiting; No diarrhea   NEUROLOGICAL: No headache or numbness, no tremors  MUSCULOSKELETAL: No joint pain, no muscle pain  GENITOURINARY: no dysuria, no frequency, no hesitancy  PSYCHIATRY: no depression , no anxiety  ALL OTHER  ROS negative        Vital Signs Last 24 Hrs  T(C): 36.9 (20 Oct 2018 14:15), Max: 37.4 (20 Oct 2018 05:30)  T(F): 98.5 (20 Oct 2018 14:15), Max: 99.3 (20 Oct 2018 05:30)  HR: 104 (20 Oct 2018 14:15) (89 - 104)  BP: 116/54 (20 Oct 2018 14:15) (106/55 - 116/54)  BP(mean): --  RR: 18 (20 Oct 2018 14:15) (18 - 18)  SpO2: 95% (20 Oct 2018 14:15) (95% - 98%)    ________________________________________________  PHYSICAL EXAM:  GENERAL: NAD, non verbal, cachectic  HEENT: Normocephalic;  conjunctivae and sclerae clear; moist mucous membranes;   NECK : supple  CHEST/LUNG: Clear to auscultation bilaterally with good air entry   HEART: S1 S2  regular; no murmurs, gallops or rubs  ABDOMEN: Soft, Nontender, Nondistended; Bowel sounds present; PEG site c/d/i  SKIN: warm and dry; no rash      _________________________________________________  LABS:                        8.6    12.2  )-----------( 430      ( 20 Oct 2018 06:46 )             27.3     10-20    135  |  101  |  8   ----------------------------<  101<H>  3.6   |  24  |  0.99    Ca    7.7<L>      20 Oct 2018 06:46  Phos  2.3     10-20  Mg     2.0     10-20          CAPILLARY BLOOD GLUCOSE      POCT Blood Glucose.: 117 mg/dL (20 Oct 2018 11:58)  POCT Blood Glucose.: 117 mg/dL (20 Oct 2018 06:44)  POCT Blood Glucose.: 122 mg/dL (20 Oct 2018 00:02)  POCT Blood Glucose.: 121 mg/dL (19 Oct 2018 19:04)        RADIOLOGY & ADDITIONAL TESTS:    Imaging Personally Reviewed:      Consultant(s) Notes Reviewed:       Care Discussed with Consultants :     Plan of care was discussed with patient and /or primary care giver; all questions and concerns were addressed and care was aligned with patient's wishes. MEDICAL ATTENDING NOTE    Patient is a 86y old  Male who presents with a chief complaint of left foot ulcer (20 Oct 2018 12:44)      INTERVAL HPI/OVERNIGHT EVENTS: Pt was to be discharged yesterday but had high residuals. Today had no residual.    MEDICATIONS  (STANDING):  ALBUTerol/ipratropium for Nebulization 3 milliLiter(s) Nebulizer every 6 hours  ascorbic acid 500 milliGRAM(s) Oral daily  aspirin enteric coated 81 milliGRAM(s) Oral daily  enoxaparin Injectable 30 milliGRAM(s) SubCutaneous daily  finasteride 5 milliGRAM(s) Oral daily  iron sucrose IVPB 200 milliGRAM(s) IV Intermittent every 24 hours  piperacillin/tazobactam IVPB. 3.375 Gram(s) IV Intermittent every 8 hours  tamsulosin 0.4 milliGRAM(s) Oral at bedtime  vancomycin  IVPB 500 milliGRAM(s) IV Intermittent every 12 hours  zinc sulfate 220 milliGRAM(s) Oral daily    MEDICATIONS  (PRN):      __________________________________________________  REVIEW OF SYSTEMS:    Unable to obtain      Vital Signs Last 24 Hrs  T(C): 36.9 (20 Oct 2018 14:15), Max: 37.4 (20 Oct 2018 05:30)  T(F): 98.5 (20 Oct 2018 14:15), Max: 99.3 (20 Oct 2018 05:30)  HR: 104 (20 Oct 2018 14:15) (89 - 104)  BP: 116/54 (20 Oct 2018 14:15) (106/55 - 116/54)  BP(mean): --  RR: 18 (20 Oct 2018 14:15) (18 - 18)  SpO2: 95% (20 Oct 2018 14:15) (95% - 98%)    ________________________________________________  PHYSICAL EXAM:  GENERAL: NAD, non verbal, cachectic  HEENT: Normocephalic;  conjunctivae and sclerae clear; moist mucous membranes;   NECK : supple  CHEST/LUNG: Clear to auscultation bilaterally with good air entry   HEART: S1 S2  regular; no murmurs, gallops or rubs  ABDOMEN: Soft, Nontender, Nondistended; Bowel sounds present; PEG site c/d/i  SKIN: warm and dry; no rash      _________________________________________________  LABS:                        8.6    12.2  )-----------( 430      ( 20 Oct 2018 06:46 )             27.3     10-20    135  |  101  |  8   ----------------------------<  101<H>  3.6   |  24  |  0.99    Ca    7.7<L>      20 Oct 2018 06:46  Phos  2.3     10-20  Mg     2.0     10-20          CAPILLARY BLOOD GLUCOSE      POCT Blood Glucose.: 117 mg/dL (20 Oct 2018 11:58)  POCT Blood Glucose.: 117 mg/dL (20 Oct 2018 06:44)  POCT Blood Glucose.: 122 mg/dL (20 Oct 2018 00:02)  POCT Blood Glucose.: 121 mg/dL (19 Oct 2018 19:04)        RADIOLOGY & ADDITIONAL TESTS:    Imaging Personally Reviewed:      Consultant(s) Notes Reviewed:       Care Discussed with Consultants :     Plan of care was discussed with patient and /or primary care giver; all questions and concerns were addressed and care was aligned with patient's wishes.

## 2018-11-09 PROBLEM — I10 ESSENTIAL (PRIMARY) HYPERTENSION: Chronic | Status: ACTIVE | Noted: 2018-10-10

## 2018-11-09 PROBLEM — F03.90 UNSPECIFIED DEMENTIA WITHOUT BEHAVIORAL DISTURBANCE: Chronic | Status: ACTIVE | Noted: 2018-10-10

## 2018-11-09 PROBLEM — N40.0 BENIGN PROSTATIC HYPERPLASIA WITHOUT LOWER URINARY TRACT SYMPTOMS: Chronic | Status: ACTIVE | Noted: 2018-10-10

## 2018-11-11 PROCEDURE — 86850 RBC ANTIBODY SCREEN: CPT

## 2018-11-11 PROCEDURE — 99285 EMERGENCY DEPT VISIT HI MDM: CPT | Mod: 25

## 2018-11-11 PROCEDURE — 90686 IIV4 VACC NO PRSV 0.5 ML IM: CPT

## 2018-11-11 PROCEDURE — 80061 LIPID PANEL: CPT

## 2018-11-11 PROCEDURE — 83036 HEMOGLOBIN GLYCOSYLATED A1C: CPT

## 2018-11-11 PROCEDURE — 82306 VITAMIN D 25 HYDROXY: CPT

## 2018-11-11 PROCEDURE — 71045 X-RAY EXAM CHEST 1 VIEW: CPT

## 2018-11-11 PROCEDURE — 83615 LACTATE (LD) (LDH) ENZYME: CPT

## 2018-11-11 PROCEDURE — 83605 ASSAY OF LACTIC ACID: CPT

## 2018-11-11 PROCEDURE — 94640 AIRWAY INHALATION TREATMENT: CPT

## 2018-11-11 PROCEDURE — 82746 ASSAY OF FOLIC ACID SERUM: CPT

## 2018-11-11 PROCEDURE — 85027 COMPLETE CBC AUTOMATED: CPT

## 2018-11-11 PROCEDURE — 83010 ASSAY OF HAPTOGLOBIN QUANT: CPT

## 2018-11-11 PROCEDURE — 84100 ASSAY OF PHOSPHORUS: CPT

## 2018-11-11 PROCEDURE — 86901 BLOOD TYPING SEROLOGIC RH(D): CPT

## 2018-11-11 PROCEDURE — 71250 CT THORAX DX C-: CPT

## 2018-11-11 PROCEDURE — 87040 BLOOD CULTURE FOR BACTERIA: CPT

## 2018-11-11 PROCEDURE — 87798 DETECT AGENT NOS DNA AMP: CPT

## 2018-11-11 PROCEDURE — 93005 ELECTROCARDIOGRAM TRACING: CPT

## 2018-11-11 PROCEDURE — 87633 RESP VIRUS 12-25 TARGETS: CPT

## 2018-11-11 PROCEDURE — 85652 RBC SED RATE AUTOMATED: CPT

## 2018-11-11 PROCEDURE — 87086 URINE CULTURE/COLONY COUNT: CPT

## 2018-11-11 PROCEDURE — 84439 ASSAY OF FREE THYROXINE: CPT

## 2018-11-11 PROCEDURE — 80076 HEPATIC FUNCTION PANEL: CPT

## 2018-11-11 PROCEDURE — 87581 M.PNEUMON DNA AMP PROBE: CPT

## 2018-11-11 PROCEDURE — 80202 ASSAY OF VANCOMYCIN: CPT

## 2018-11-11 PROCEDURE — 80053 COMPREHEN METABOLIC PANEL: CPT

## 2018-11-11 PROCEDURE — 73718 MRI LOWER EXTREMITY W/O DYE: CPT

## 2018-11-11 PROCEDURE — 87070 CULTURE OTHR SPECIMN AEROBIC: CPT

## 2018-11-11 PROCEDURE — 80048 BASIC METABOLIC PNL TOTAL CA: CPT

## 2018-11-11 PROCEDURE — 86900 BLOOD TYPING SEROLOGIC ABO: CPT

## 2018-11-11 PROCEDURE — 83735 ASSAY OF MAGNESIUM: CPT

## 2018-11-11 PROCEDURE — 85610 PROTHROMBIN TIME: CPT

## 2018-11-11 PROCEDURE — 84443 ASSAY THYROID STIM HORMONE: CPT

## 2018-11-11 PROCEDURE — 82962 GLUCOSE BLOOD TEST: CPT

## 2018-11-11 PROCEDURE — 83550 IRON BINDING TEST: CPT

## 2018-11-11 PROCEDURE — L8699: CPT

## 2018-11-11 PROCEDURE — 86140 C-REACTIVE PROTEIN: CPT

## 2018-11-11 PROCEDURE — 73630 X-RAY EXAM OF FOOT: CPT

## 2018-11-11 PROCEDURE — 82607 VITAMIN B-12: CPT

## 2018-11-11 PROCEDURE — 87186 SC STD MICRODIL/AGAR DIL: CPT

## 2018-11-11 PROCEDURE — 85730 THROMBOPLASTIN TIME PARTIAL: CPT

## 2018-11-11 PROCEDURE — 81001 URINALYSIS AUTO W/SCOPE: CPT

## 2018-11-11 PROCEDURE — 92610 EVALUATE SWALLOWING FUNCTION: CPT

## 2018-11-11 PROCEDURE — 87486 CHLMYD PNEUM DNA AMP PROBE: CPT

## 2018-11-11 PROCEDURE — 82728 ASSAY OF FERRITIN: CPT

## 2019-01-08 ENCOUNTER — APPOINTMENT (OUTPATIENT)
Dept: GASTROENTEROLOGY | Facility: CLINIC | Age: 84
End: 2019-01-08

## 2020-10-20 NOTE — ED ADULT TRIAGE NOTE - STATUS:
Referred by: Mango Pan MD; Medical Diagnosis (from order):    Diagnosis Information      Diagnosis    V43.64 (ICD-9-CM) - Z96.642 (ICD-10-CM) - Status post left hip replacement                Physical Therapy -  Initial Evaluation    Visit:  1   Treatment Diagnosis: left: hip, symptoms with increased pain/symptoms, impaired range of motion, impaired muscle length/flexibility, impaired joint play/mobility, impaired mobility, impaired motor function/performance/coordination, impaired strength, impaired coordination, impaired body mechanics, impaired balance, impaired gait  Date of surgery: 9/2/2020  Procedure: Left total hip arthroplasty.     Diagnosis Precautions: Anterior hip precautions: patient is 7 weeks post surgery.  Chart reviewed at time of initial evaluation (relevant co-morbidities, allergies, tests and medications listed): Relevant Medical History: Cervical spondylosis without myelopathy, DDD (degenerative disc disease), lumbar, lumbar  radiculopathy, sleep deprivation, anxiety and depression, history of TIA (transient ischemic attack), occasional tremors, neuropathy, insomnia, hypertension, GERD (gastroesophageal reflux disease), Bipolar affective disorder (CMS/HCC), macrocytic anemia, CKD (chronic kidney disease) stage 3, osteoarthritis of right hip  Diagnostic Tests: X-ray: reviewed.      SUBJECTIVE                                                                                                             Patient reports that she continues to have left sided pain which extends down the left hip and into the toes. She states that occasionally she experiences significant moments of hip pain where she feels that the leg adilia. This began 2 weeks ago and was happening twice daily. It's been happening less frequently lately. She has been following her hip precautions well and demonstrates the ability to verbalize them. Her primary concerns include improving her hip strength and reducing some of the  posterior hip pain.     Pain / Symptoms:  Pain rating (out of 10): Current: 5 ; Best: 6  Location: Left hip, posterior.    Quality / Description: ache, sharp.  Alleviating Factors: avoiding movement in involved area, rest.   Function:   Limitations / Exacerbation Factors: pain, difficulty, increased time, bending/squatting/lifting, low transfer (toilet/couch), stairs, community distances, Walking up to 30 minutes at the moment.    Prior Level of Function: worsening pain and function, therefore underwent surgery,  Patient Goals: decreased pain, increased motion, increased strength, independence with ADLs/IADLs and return to sport/leisure activities    Prior treatment: home PT  Discharged from hospital, home health, or skilled nursing facility in last 30 days: no    Home Environment:   Patient lives with alone  Type of home: apartment  Assistance available: consistent  Feels safe at home/work/school.  2 or more falls or an unexplained fall with injury in the last year:  No    OBJECTIVE                                                                                                                     Range of Motion (ROM)   (degrees unless noted; active unless noted; norms in ( ); negative=lacking to 0, positive=beyond 0)   Hip:      - Flexion (100-120):        • Left: 100 Passive: pain        • Right: 110    - ABDuction (40):        • Right: 20    - Internal Rotation in sitting (45-50):        • Right: 16    - External Rotation in sitting (45-50):        • Right: 30    Strength  (out of 5 unless noted, standard test position unless noted, lbs tested with hand held dynamometer)   Hip:    - Flexion:        • Left: 4-        • Right: 4    - Internal Rotation:        • Right: 4    - External Rotation:        • Right: 4  Knee:    - Flexion:        • Left: 4        • Right: 4+    - Extension:        • Left: 4+        • Right: 4+  Ankle:    - Dorsiflexion:        • Left: 4+        • Right: 4+  Comments / Details: Hip  abduction isometric is strong, provokes pain on the left.         Comments / Details: Outcome Measures:   HOOS, JR Raw Score: 13  HOOS Jr Calculated Score: 49.86  (interval score: 0=total hip disability to 100=perfect hip health) see flowsheet for additional documentation    TREATMENT                                                                                                                  Therapeutic Exercise:  Bilateral knee to chest with slow lower, pain free range. 2 x 8 reps.   Short amplitude straight leg raise with quad set: 1 x 8 reps.   Short amplitude bridge: 1 x 8 reps.   Sit-stands from high chair: 10 repetitions.     Skilled input: verbal instruction/cues, tactile instruction/cues and posture correction    Writer verbally educated and received verbal consent for hand placement, positioning of patient, and techniques to be performed today from patient for clothing adjustments for techniques, therapist position for techniques and hand placement and palpation for techniques as described above and how they are pertinent to the patient's plan of care.    Home Exercise Program: Access Code: [email or web address]   URL: [email or web address]   Date: 10/20/2020   Prepared by: Carlin Chan     Exercises  Supine Pelvic Tilt with Straight Leg Raise - 10 reps - 3 sets - 2-1-3 seconds tempo - 1x daily - 7x weekly  Supine March - 10 reps - 3 sets - 2-1-3 seconds tempo - 1x daily - 7x weekly  Sit to Stand - 10 reps - 3 sets - 2-1-3 seconds tempo - 1x daily - 7x weekly     ASSESSMENT                                                                                                           66 year old female patient has reported functional limitations listed above impacted by signs and symptoms consistent with below   • Involved:       - left hip   • Symptoms/impairments: increased pain/symptoms, impaired range of motion, impaired muscle length/flexibility, impaired joint play/mobility, impaired mobility,  impaired motor function/performance/coordination, impaired strength, impaired coordination, impaired body mechanics, impaired balance and impaired gait  Patient presents above listed deficits according to presentation of status post anterior total hip arthroplasty. Patient will benefit from initial rehabilitative interventions to improve hip strength and range of motion, normalize gait, improve balance and promote efficient and safe stair negotiation.   Pain/symptoms after session: 4    Prognosis: patient will benefit from skilled therapy  Rehabilitative potential is: good  Predicted patient presentation: Moderate (evolving) - Patient comorbidities and complexities, as defined above, may have varying impact on steady progress for prescribed plan of care.  Patient Education:   Who will be receiving education: patient  Are they ready to learn: yes  Preferred learning style: written, verbal, demonstration and video  Barriers to learning: no barriers apparent at this time  Results of above outlined education: Verbalizes understanding and Needs reinforcement      PLAN                                                                                                                         The following skilled interventions to be implemented to achieve goals listed below:  Gait Training (16908)  Manual Therapy (81033)  Neuromuscular Re-Education (49385)  Therapeutic Activity (82187)  Therapeutic Exercise (12445)  Electrical Stimulation (59966//36305)  Heat/Cold (94490)    Frequency / Duration: 2 times per week tapering as patient progresses for an estimated total of 16 visits for 10 weeks    Patient involved in and agreed to plan of care and goals.  Patient given attendance policy at time of initial evaluation. and Attendance policy reviewed with patient.  Suggestions for next session as indicated: Progress per plan of care. Nustep, resisted knee extensions and flexions, short amplitude leg press, step ups.       GOALS                                                                                                                        Long Term Goals: To be met by end of plan of care:      Home Exercise Program: Independent with progressed and modified home exercise program (HEP)      Range of Motion: Improve involved range of motion (ROM) to   Equivalence of the right hip for promotion of functional mobility.     Stairs: Ascend and descend 1 flight, modified independent and with reported manageable/tolerable difficulty     Patient Reported Outcome Measure: Improvement in function /disability/impairment as indicated by HOOS jr (minimal clinically important difference 18 in raw score) > or =   68       Procedures and total treatment time documented Time Entry flowsheet.   Applied

## 2021-01-13 NOTE — ED PROVIDER NOTE - CONSTITUTIONAL, MLM
HOSPITALIST PROGRESS NOTE:     Date of service: 1/13/2021    Medical Problems/ Reason for Visit:     COVID-19 pneumonia  Hypoxemia due to above    Hyponatremia, mild  Alcohol use disorder , Recently Quit alcohol 2 months ago as per patient.   Elevated bilirubin and AST >> Due to alcoholic Liver Cirrhosis    Thrombocytopenia >> due to Liver Cirrhosis.   Left inguinal hernia, uncomplicated hernia    Subjective :   Reports shortness of breath is getting better at rest.  Still feels short of breath with minimal activity in room.  Remains on oxygen 1 liters/minute.  Reports his appetite is little better today.  Test result for COVID-19 came out positive last night as suspected.    Objective:      Vital 24 Hour Range Last Value   Temperature Temp  Min: 97.2 °F (36.2 °C)  Max: 99.1 °F (37.3 °C) 98.1 °F (36.7 °C) (01/13/21 0838)   Pulse Pulse  Min: 62  Max: 82 65 (01/13/21 0838)   Respiratory Resp  Min: 16  Max: 19 18 (01/13/21 0838)   Non-Invasive  Blood Pressure BP  Min: 99/54  Max: 128/67 125/61 (01/13/21 0838)   Pulse Oximetry SpO2  Min: 92 %  Max: 97 % 94 % (01/13/21 0838)     Admit Weight:   Weight: 95.3 kg (01/12/21 0944)    Weight over the past 48 Hours:  Patient Vitals for the past 48 hrs:   Weight   01/12/21 0944 95.3 kg   01/12/21 1415 93.4 kg        BMI:   BMI (Calculated): 31.31 (01/12/21 1415)    Intake/Output:    Last Stool Occurrence:      I/O this shift:  In: -   Out: 400 [Urine:400]    No intake/output data recorded.    Physical Exam:     GENERAL: Alert, awake and communicative.  RESPIRATORY: No respiratory distress.  Mild wheezes. No crackles.   HEART: Regular rate and rhythm. No murmur, No pedal edema.  ABDOMEN: Nondistended, soft and nontender. No hepatosplenomegaly.   NEUROLOGIC: No gross motor deficits noted in all four extremities.  Face symmetrical. Normal speech. Normal eye movements.   PSYCHIATRY: Oriented to time, place and person. Normal mood and affect.     Laboratory Results:    Recent Labs    Lab 01/13/21  0552 01/12/21  0948 01/12/21  0947 01/12/21  0945   SODIUM 136 132*  --   --    POTASSIUM 4.5 3.5  --   --    CHLORIDE 111* 101  --   --    CO2 20* 24  --   --    BUN 18 16  --   --    CREATININE 0.74 1.04  --  1.00   GLUCOSE 146* 103*  --   --    WBC 6.0 5.4  --   --    HGB 13.2 15.0  --   --    HCT 40.5 44.9  --   --    PLT 71* 84*  --   --    PT  --   --  13.0*  --    INR  --   --  1.2  --    PTT  --   --  34*  --      Medications/Infusions:  Scheduled:   • remdesivir 200 mg/250 mL NS IVPB  200 mg Intravenous Once    Followed by   • [START ON 1/14/2021] remdesivir 100 mg/250 mL NS IVPB  100 mg Intravenous Daily at noon   • dexamethasone  6 mg Intravenous Daily   • sodium chloride (PF)  2 mL Intracatheter 2 times per day   • allopurinol  300 mg Oral Daily       Continuous Infusions:     Assessment and Plan:    COVID-19 pneumonia  Hypoxemia due to above     Continue on oxygen supplement as needed.  Continue on IV Decadron  Seen by ID and started on IV remdesivir today.  Monitor labs and inflammatory markers and clinical progress.  Unfortunately cannot give pharmacological DVT prophylaxis because of thrombocytopenia.  Will continue with SCD/status and encouraged patient for ambulation in room.     Mild lactic acidosis >> suspect due to dehydration  Lactic acid level was improved after IV fluid bolus given on admission.    He has good oral intake today.  Cap IV fluid.     Hyponatremia, mild  Monitor BMP     Alcohol use disorder , Recently Quit alcohol 2 months ago as per patient.   Elevated bilirubin and AST >> Due to alcoholic Liver Cirrhosis    Workup with liver ultrasound is suggestive of liver cirrhosis.  His LFTs are in stable range today.  Encouraged patient to maintain abstinence from alcohol.  Monitor LFTs.     Thrombocytopenia >> suspect due to liver cirrhosis.  Monitor blood counts     Left inguinal hernia, uncomplicated hernia  Patient will need outpatient general surgery evaluation after  he is fully recovered from pneumonia.    -------------------------------------------------------------------------------------------------------------------  Care Plan discussed with and/or Notes reviewed from :  Patient , RN, ID and .     Code status: Full Resuscitation      Signed:  Walker Hicks MD  1/13/2021  10:50 AM   - - -

## 2021-03-24 NOTE — H&P ADULT - HISTORY OF PRESENT ILLNESS
EMERGENCY DEPARTMENT HISTORY AND PHYSICAL EXAM      Date: 3/24/2021  Patient Name: Mingo Gamez    History of Presenting Illness     Chief Complaint   Patient presents with    Penile Discharge     History Provided By: Patient    HPI: Mingo Gamez, 64 y.o. male with medical history significant for hypertension, kidney cancer, hepatitis C who presents via self to the ED with cc of acute mild penile discharge X 1 day. No medications or modifying factors. Endorses concern for STD. Endorses new female partner. No fever, chills, nausea, vomiting, testicular pain or swelling, genital sore rash. PCP: Dary Nash MD    There are no other complaints, changes, or physical findings at this time. No current facility-administered medications on file prior to encounter. Current Outpatient Medications on File Prior to Encounter   Medication Sig Dispense Refill    amLODIPine (NORVASC) 10 mg tablet Take 1 Tab by mouth daily.  90 Tab 1    sildenafil citrate (VIAGRA) 25 mg tablet TAKE 1 TABLET BY MOUTH AS NEEDED FOR ERECTILE DYSFUNCTION 10 Tab 0     Past History     Past Medical History:  Past Medical History:   Diagnosis Date    Cancer ([facility] [address] 2015    TUMOR RIGHT KIDNEY    Gallstones 2017    GERD (gastroesophageal reflux disease)     Hypertension     Liver disease     HX HEP C DX 1996    Aditya Anshu - Jean tear 6/6/2012    Requiring blood transfusion 1996     Renal mass 8/20/2015     Past Surgical History:  Past Surgical History:   Procedure Laterality Date    HX CHOLECYSTECTOMY  10/2017    HX NEPHRECTOMY Right     partial. at [facility]    HX ORTHOPAEDIC  2012    Left Knee fracture    AK STOMACH SURGERY PROCEDURE UNLISTED       Family History:  Family History   Problem Relation Age of Onset   Mirtha Her Stroke Mother         TIA    Hypertension Mother     Thyroid Disease Mother     Dementia Mother     Cancer Father         stomach    Cancer Brother         colon    Hypertension Brother     Sleep Apnea Brother  Arthritis-osteo Brother     Anesth Problems Neg Hx      Social History:  Social History     Tobacco Use    Smoking status: Never Smoker    Smokeless tobacco: Never Used   Substance Use Topics    Alcohol use: Yes     Alcohol/week: 0.8 standard drinks     Types: 1 Cans of beer per week     Comment: 3-4 beers on weekend    Drug use: No     Allergies:  No Known Allergies  Review of Systems   Review of Systems   Constitutional: Negative. Negative for activity change, chills, fatigue and fever. HENT: Negative for congestion, rhinorrhea and sore throat. Eyes: Negative. Respiratory: Negative. Negative for cough and shortness of breath. Cardiovascular: Negative. Negative for chest pain. Gastrointestinal: Negative for abdominal pain, constipation, diarrhea, nausea and vomiting. Genitourinary: Positive for discharge. Negative for difficulty urinating, dysuria, flank pain, frequency, genital sores, hematuria, penile pain, penile swelling, scrotal swelling, testicular pain and urgency. Musculoskeletal: Negative for arthralgias, back pain and joint swelling. Skin: Negative. Negative for rash. Neurological: Negative for weakness, light-headedness and headaches. Psychiatric/Behavioral: Negative. Physical Exam   Physical Exam  Vitals signs and nursing note reviewed. Constitutional:       General: He is not in acute distress. Appearance: He is well-developed. He is not diaphoretic. HENT:      Head: Normocephalic and atraumatic. Right Ear: Hearing and external ear normal.      Left Ear: Hearing and external ear normal.      Nose: Nose normal.   Eyes:      Conjunctiva/sclera: Conjunctivae normal.      Pupils: Pupils are equal, round, and reactive to light. Neck:      Musculoskeletal: Normal range of motion. Pulmonary:      Effort: Pulmonary effort is normal. No accessory muscle usage or respiratory distress. Musculoskeletal: Normal range of motion.    Skin:     General: Skin is warm and dry. Coloration: Skin is not pale. Neurological:      Mental Status: He is alert and oriented to person, place, and time. Psychiatric:         Speech: Speech normal.         Behavior: Behavior normal.         Thought Content: Thought content normal.         Judgment: Judgment normal.       Diagnostic Study Results   Labs -     Recent Results (from the past 12 hour(s))   URINALYSIS W/ REFLEX CULTURE    Collection Time: 03/24/21  4:39 PM    Specimen: Urine   Result Value Ref Range    Color YELLOW/STRAW      Appearance CLEAR CLEAR      Specific gravity 1.015 1.003 - 1.030      pH (UA) 7.0 5.0 - 8.0      Protein Negative NEG mg/dL    Glucose Negative NEG mg/dL    Ketone Negative NEG mg/dL    Bilirubin Negative NEG      Blood Negative NEG      Urobilinogen 1.0 0.2 - 1.0 EU/dL    Nitrites Negative NEG      Leukocyte Esterase Negative NEG      WBC 0-4 0 - 4 /hpf    RBC 0-5 0 - 5 /hpf    Epithelial cells FEW FEW /lpf    Bacteria 1+ (A) NEG /hpf    UA:UC IF INDICATED CULTURE NOT INDICATED BY UA RESULT CNI         Radiologic Studies -   No orders to display     No results found. Medical Decision Making   I am the first provider for this patient. I reviewed the vital signs, available nursing notes, past medical history, past surgical history, family history and social history. Vital Signs-Reviewed the patient's vital signs. Patient Vitals for the past 24 hrs:   Temp Pulse Resp BP SpO2   03/24/21 1600 97.6 °F (36.4 °C) 70 18 (!) 142/86 100 %     Pulse Oximetry Analysis - 100% on RA (normal)    Records Reviewed: Nursing Notes, Old Medical Records, Previous Radiology Studies and Previous Laboratory Studies    Provider Notes (Medical Decision Making):   Patient presents with penile discharge. DDx: Yeast infection, Chlamydia, gonorrhea, trichomonas, herpes, UTI. Will get GC, UA and treat PRN empirically with CTX IM and Azithromycin PO.      Pt has been re-examined and states that they are feeling better and have no new complaints. Laboratory tests, medications, x-rays, diagnosis, follow up plan and return instructions have been reviewed and discussed with the patient. The patient has had the opportunity to ask questions about their care. Patient expresses understanding and agreement with care plan, follow up and return instructions. Patent agrees to return in 48 hours if their symptoms are not improving or immediately if they have any change in their condition. Patient expresses understanding that genital swabs for G/C are pending and that they should refrain from sexual activity until the results are known. ED Course:   Initial assessment performed. The patients presenting problems have been discussed, and they are in agreement with the care plan formulated and outlined with them. I have encouraged them to ask questions as they arise throughout their visit. Progress Note:   Updated pt on all returned results and findings. Discussed the importance of proper follow up as referred below along with return precautions. Pt in agreement with the care plan and expresses agreement with and understanding of all items discussed. Disposition:  5:09 PM  I have discussed with patient their diagnosis, treatment, and follow up plan. The patient agrees to follow up as outlined in discharge paperwork and also to return to the ED with any worsening. Irene Saldaña PA-C      PLAN:  1. Current Discharge Medication List        2. Follow-up Information     Follow up With Specialties Details Why Contact Info    Debra Raymond MD Family Medicine Schedule an appointment as soon as possible for a visit  As needed [id number] [phone]          Return to ED if worse     Diagnosis     Clinical Impression:   1. Penile discharge    2.  Concern about STD in male without diagnosis            Please note that this dictation was completed with Dragon, computer voice recognition 86/ M Pt with PMH of BPH, HTN, and Advanced Dementia presents to ED c/o right foot ulceration x 2 weeks. Pt visited podiatrist yesterday for a worsening right foot ulceration, located by his big toe form where he was sent to hospital for further work up, Patient was seen in ED by podiatry. software. Quite often unanticipated grammatical, syntax, homophones, and other interpretive errors are inadvertently transcribed by the computer software. Please disregard these errors. Additionally, please excuse any errors that have escaped final proofreading.

## 2021-03-25 NOTE — PROGRESS NOTE ADULT - MS EXT PE MLT D E PC
----- Message from Radha Charles MD sent at 3/25/2021  9:35 AM CDT -----  Please tell patient it took it took a while to get all of his labs in because I had a keep ordering labs based on results of lab results that were coming and he does have low testosterone if he would like treatment we can refer him specifically to Dr. Samantha Hernández in  Urology this might be causing some of his night sweats but all of the more serious diseases that I checked that can cause that including rheumatological panel have come back negative therefore we have no objective evidence to do any additional letter regarding working day shift only and even though we are referring him possibly to Urology low testosterone is never a valid indication anywhere for any work restrictions I would recommend that if he is taking 5000 I use of  vitamin-D that he increase that to 10,000 IU which is over-the-counter or he can take 2 of the 5000 per day additionally his triglycerides were very high at over 700 he needs to cut alcohol and carbs and I started him on simvastatin and we should see him in  about 3 months for a fasting follow-up and we will recheck some labs including vitamin-D and lipid panel after he has been on appropriate meds for couple months no pedal edema

## 2024-10-28 NOTE — ED PROVIDER NOTE - NS ED SCRIBE STATEMENT
Attending Acitretin Pregnancy And Lactation Text: This medication is Pregnancy Category X and should not be given to women who are pregnant or may become pregnant in the future. This medication is excreted in breast milk.

## 2025-02-11 NOTE — CONSULT NOTE ADULT - PROBLEM SELECTOR RECOMMENDATION 3
R foot ulcer, antibiotics per ID, podiatry following, continue wound care. For MRI foot asks questions/responsive

## 2025-05-26 NOTE — PROGRESS NOTE ADULT - PROBLEM SELECTOR PLAN 1
-was sent from podiatry for foot ulcer  -Xray foot: Lucency at the head of the first metatarsal which may represent osteomyelitis  -podiatry was consulted and recommended MRI- order placed **  -ID Dr. Zimmer: continue with vanco and zosyn   -follow MRI result  -f/u with podiatry ambulatory -was sent from podiatry for foot ulcer  -Xray foot: Lucency at the head of the first metatarsal which may represent osteomyelitis  -podiatry was consulted and recommended MRI- order placed **  -ID Dr. Zimmer: continue with vanco and zosyn   -follow up MRI result  -f/u with podiatry  -f/u blood culture result. -was sent from podiatry for foot ulcer  -Xray foot: Lucency at the head of the first metatarsal which may represent osteomyelitis  -podiatry was consulted and recommended MRI- order placed **  -ID Dr. Zimmer: continue with vanco and zosyn   -follow up MRI result**  -f/u with podiatry  -f/u blood culture result.

## 2025-06-25 NOTE — SWALLOW BEDSIDE ASSESSMENT ADULT - ORAL PREPARATORY PHASE
Discharge Summary - Plastic Surgery   Name: Chance Lindsey 59 y.o. 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  Reason for Admission:  Chance Lindsey was electively admitted to undergo the above named procedure on 6/25/2025 as an outpatient.  Hospital Course:  Patient underwent the above named procedure on the day of admission without complications. They were discharged home the same day.  Disposition:  To home in care of self and family.  Condition:  Good  Follow up:  Patient with follow up in the office with Dr. Baldev Castro MD in 1 week(s) or as scheduled per his office  Baldev Castro MD  6/25/2025 7:12 AM   
Reduced oral grading/reduced bolus formation